# Patient Record
Sex: MALE | Race: WHITE | NOT HISPANIC OR LATINO | Employment: FULL TIME | URBAN - METROPOLITAN AREA
[De-identification: names, ages, dates, MRNs, and addresses within clinical notes are randomized per-mention and may not be internally consistent; named-entity substitution may affect disease eponyms.]

---

## 2017-01-13 RX ORDER — SERTRALINE HYDROCHLORIDE 100 MG/1
TABLET, FILM COATED ORAL
Qty: 30 TABLET | Refills: 11 | Status: SHIPPED | OUTPATIENT
Start: 2017-01-13 | End: 2018-01-24 | Stop reason: SDUPTHER

## 2017-01-13 RX ORDER — LISINOPRIL AND HYDROCHLOROTHIAZIDE 12.5; 2 MG/1; MG/1
TABLET ORAL
Qty: 30 TABLET | Refills: 11 | Status: SHIPPED | OUTPATIENT
Start: 2017-01-13 | End: 2018-01-24 | Stop reason: SDUPTHER

## 2017-05-17 ENCOUNTER — PATIENT OUTREACH (OUTPATIENT)
Dept: ADMINISTRATIVE | Facility: HOSPITAL | Age: 62
End: 2017-05-17

## 2017-05-18 DIAGNOSIS — E78.5 HYPERLIPIDEMIA: ICD-10-CM

## 2017-05-18 RX ORDER — ATORVASTATIN CALCIUM 40 MG/1
TABLET, FILM COATED ORAL
Qty: 30 TABLET | Refills: 0 | Status: SHIPPED | OUTPATIENT
Start: 2017-05-18 | End: 2017-06-20 | Stop reason: SDUPTHER

## 2017-06-19 ENCOUNTER — PATIENT OUTREACH (OUTPATIENT)
Dept: ADMINISTRATIVE | Facility: HOSPITAL | Age: 62
End: 2017-06-19

## 2017-06-19 NOTE — PROGRESS NOTES
CHART AUDIT COMPLETED. PORTAL MESSAGE SENT TO INFORM PATIENT OF OVERDUE HEALTH MAINTENANCE. LAB ORDERS PENDED.

## 2017-06-20 ENCOUNTER — PATIENT MESSAGE (OUTPATIENT)
Dept: ADMINISTRATIVE | Facility: HOSPITAL | Age: 62
End: 2017-06-20

## 2017-06-20 DIAGNOSIS — I10 ESSENTIAL HYPERTENSION: Primary | ICD-10-CM

## 2017-06-20 DIAGNOSIS — Z12.5 PROSTATE CANCER SCREENING: ICD-10-CM

## 2017-06-20 DIAGNOSIS — E78.5 HYPERLIPIDEMIA: ICD-10-CM

## 2017-06-20 DIAGNOSIS — Z82.49 FAMILY HISTORY OF ISCHEMIC HEART DISEASE (IHD): ICD-10-CM

## 2017-06-20 DIAGNOSIS — E78.5 HYPERLIPIDEMIA, UNSPECIFIED HYPERLIPIDEMIA TYPE: ICD-10-CM

## 2017-06-20 RX ORDER — ATORVASTATIN CALCIUM 40 MG/1
TABLET, FILM COATED ORAL
Qty: 30 TABLET | Refills: 0 | Status: SHIPPED | OUTPATIENT
Start: 2017-06-20 | End: 2017-07-20 | Stop reason: SDUPTHER

## 2017-07-10 ENCOUNTER — LAB VISIT (OUTPATIENT)
Dept: LAB | Facility: HOSPITAL | Age: 62
End: 2017-07-10
Attending: INTERNAL MEDICINE
Payer: COMMERCIAL

## 2017-07-10 DIAGNOSIS — E78.5 HYPERLIPIDEMIA, UNSPECIFIED HYPERLIPIDEMIA TYPE: ICD-10-CM

## 2017-07-10 DIAGNOSIS — Z82.49 FAMILY HISTORY OF ISCHEMIC HEART DISEASE (IHD): ICD-10-CM

## 2017-07-10 DIAGNOSIS — I10 ESSENTIAL HYPERTENSION: ICD-10-CM

## 2017-07-10 DIAGNOSIS — Z12.5 PROSTATE CANCER SCREENING: ICD-10-CM

## 2017-07-10 LAB
BASOPHILS # BLD AUTO: 0.06 K/UL
BASOPHILS NFR BLD: 0.9 %
DIFFERENTIAL METHOD: ABNORMAL
EOSINOPHIL # BLD AUTO: 0.2 K/UL
EOSINOPHIL NFR BLD: 2.8 %
ERYTHROCYTE [DISTWIDTH] IN BLOOD BY AUTOMATED COUNT: 14.2 %
HCT VFR BLD AUTO: 40.3 %
HGB BLD-MCNC: 13.7 G/DL
LYMPHOCYTES # BLD AUTO: 1.8 K/UL
LYMPHOCYTES NFR BLD: 27.9 %
MCH RBC QN AUTO: 30.4 PG
MCHC RBC AUTO-ENTMCNC: 34 %
MCV RBC AUTO: 89 FL
MONOCYTES # BLD AUTO: 0.9 K/UL
MONOCYTES NFR BLD: 13.5 %
NEUTROPHILS # BLD AUTO: 3.5 K/UL
NEUTROPHILS NFR BLD: 54.4 %
PLATELET # BLD AUTO: 302 K/UL
PMV BLD AUTO: 9.8 FL
RBC # BLD AUTO: 4.51 M/UL
WBC # BLD AUTO: 6.38 K/UL

## 2017-07-10 PROCEDURE — 36415 COLL VENOUS BLD VENIPUNCTURE: CPT | Mod: PO

## 2017-07-10 PROCEDURE — 84153 ASSAY OF PSA TOTAL: CPT

## 2017-07-10 PROCEDURE — 80053 COMPREHEN METABOLIC PANEL: CPT

## 2017-07-10 PROCEDURE — 85025 COMPLETE CBC W/AUTO DIFF WBC: CPT

## 2017-07-10 PROCEDURE — 80061 LIPID PANEL: CPT

## 2017-07-11 ENCOUNTER — LAB VISIT (OUTPATIENT)
Dept: LAB | Facility: HOSPITAL | Age: 62
End: 2017-07-11
Attending: INTERNAL MEDICINE
Payer: COMMERCIAL

## 2017-07-11 ENCOUNTER — OFFICE VISIT (OUTPATIENT)
Dept: INTERNAL MEDICINE | Facility: CLINIC | Age: 62
End: 2017-07-11
Payer: COMMERCIAL

## 2017-07-11 ENCOUNTER — PATIENT MESSAGE (OUTPATIENT)
Dept: INTERNAL MEDICINE | Facility: CLINIC | Age: 62
End: 2017-07-11

## 2017-07-11 VITALS
SYSTOLIC BLOOD PRESSURE: 100 MMHG | TEMPERATURE: 97 F | HEART RATE: 76 BPM | DIASTOLIC BLOOD PRESSURE: 70 MMHG | WEIGHT: 239.44 LBS | BODY MASS INDEX: 31.73 KG/M2 | HEIGHT: 73 IN

## 2017-07-11 DIAGNOSIS — E78.2 MIXED HYPERLIPIDEMIA: ICD-10-CM

## 2017-07-11 DIAGNOSIS — F32.A DEPRESSION, UNSPECIFIED DEPRESSION TYPE: ICD-10-CM

## 2017-07-11 DIAGNOSIS — M25.571 ARTHRALGIA OF RIGHT FOOT: ICD-10-CM

## 2017-07-11 DIAGNOSIS — I10 ESSENTIAL HYPERTENSION: ICD-10-CM

## 2017-07-11 DIAGNOSIS — Z00.00 ROUTINE GENERAL MEDICAL EXAMINATION AT HEALTH CARE FACILITY: Primary | ICD-10-CM

## 2017-07-11 LAB
ALBUMIN SERPL BCP-MCNC: 3.5 G/DL
ALP SERPL-CCNC: 78 U/L
ALT SERPL W/O P-5'-P-CCNC: 34 U/L
ANION GAP SERPL CALC-SCNC: 11 MMOL/L
AST SERPL-CCNC: 30 U/L
BILIRUB SERPL-MCNC: 0.6 MG/DL
BUN SERPL-MCNC: 20 MG/DL
CALCIUM SERPL-MCNC: 9.3 MG/DL
CHLORIDE SERPL-SCNC: 96 MMOL/L
CHOLEST/HDLC SERPL: 3.8 {RATIO}
CO2 SERPL-SCNC: 29 MMOL/L
COMPLEXED PSA SERPL-MCNC: 0.56 NG/ML
CREAT SERPL-MCNC: 1.3 MG/DL
EST. GFR  (AFRICAN AMERICAN): >60 ML/MIN/1.73 M^2
EST. GFR  (NON AFRICAN AMERICAN): 58.5 ML/MIN/1.73 M^2
GLUCOSE SERPL-MCNC: 73 MG/DL
HDL/CHOLESTEROL RATIO: 26.2 %
HDLC SERPL-MCNC: 145 MG/DL
HDLC SERPL-MCNC: 38 MG/DL
LDLC SERPL CALC-MCNC: 62.6 MG/DL
NONHDLC SERPL-MCNC: 107 MG/DL
POTASSIUM SERPL-SCNC: 3.5 MMOL/L
PROT SERPL-MCNC: 7.4 G/DL
SODIUM SERPL-SCNC: 136 MMOL/L
TRIGL SERPL-MCNC: 222 MG/DL
URATE SERPL-MCNC: 9.7 MG/DL

## 2017-07-11 PROCEDURE — 36415 COLL VENOUS BLD VENIPUNCTURE: CPT | Mod: PO

## 2017-07-11 PROCEDURE — 99999 PR PBB SHADOW E&M-EST. PATIENT-LVL III: CPT | Mod: PBBFAC,,, | Performed by: INTERNAL MEDICINE

## 2017-07-11 PROCEDURE — 90736 HZV VACCINE LIVE SUBQ: CPT | Mod: S$GLB,,, | Performed by: INTERNAL MEDICINE

## 2017-07-11 PROCEDURE — 84550 ASSAY OF BLOOD/URIC ACID: CPT

## 2017-07-11 PROCEDURE — 99396 PREV VISIT EST AGE 40-64: CPT | Mod: S$GLB,,, | Performed by: INTERNAL MEDICINE

## 2017-07-11 PROCEDURE — 90471 IMMUNIZATION ADMIN: CPT | Mod: S$GLB,,, | Performed by: INTERNAL MEDICINE

## 2017-07-11 RX ORDER — HYDROCORTISONE 25 MG/G
CREAM TOPICAL
COMMUNITY
Start: 2017-05-23 | End: 2021-12-02

## 2017-07-11 RX ORDER — INDOMETHACIN 75 MG/1
75 CAPSULE, EXTENDED RELEASE ORAL 2 TIMES DAILY
Qty: 60 CAPSULE | Refills: 1 | Status: SHIPPED | OUTPATIENT
Start: 2017-07-11 | End: 2018-12-23 | Stop reason: SDUPTHER

## 2017-07-11 NOTE — PROGRESS NOTES
Here are the results of your recent labs.  We will review them in detail at your follow up visit.    Sincerely,    Ravindra Bernard M.D.        If you would like to review your experience with Dr. Bernard or [facility], please follow the link below:    [email or web address]

## 2017-07-11 NOTE — PROGRESS NOTES
"Subjective:       Patient ID: Byron Styles is a 62 y.o. male.    Chief Complaint: Annual Exam    HPI  Patient is a 62-year-old male with a history of hypertension hyperlipidemia depression.  He presents to for updated physical exam review chronic health issues.  He indicates he is not having significant problems at this time.  He is tolerating his medications well.  He is not had any hospitalizations or emergency room visits.    He is concerned about the possibility of some gout.  He states over the last week or so he's been having significant pain in the right great toe region.  Says he area around it is been red and hot and exquisitely tender.  He has not had gout in the past but from what he's been reading this certainly sounds like gout.  He has tried some over-the-counter anti-inflammatories with limited improvement.    Review of Systems   Constitutional: Negative for activity change, fever and unexpected weight change.   HENT: Negative for hearing loss, postnasal drip, rhinorrhea and trouble swallowing.    Eyes: Negative for pain, discharge and visual disturbance.   Respiratory: Negative for cough, chest tightness, shortness of breath and wheezing.    Cardiovascular: Negative for chest pain and palpitations.   Gastrointestinal: Negative for blood in stool, constipation, diarrhea, nausea and vomiting.   Endocrine: Negative for polydipsia and polyuria.   Genitourinary: Negative for difficulty urinating, dysuria, hematuria and urgency.   Musculoskeletal: Positive for arthralgias. Negative for back pain, joint swelling, myalgias, neck pain and neck stiffness.   Skin: Negative for pallor and rash.   Neurological: Negative for seizures, syncope, weakness and headaches.   Hematological: Negative for adenopathy.   Psychiatric/Behavioral: Negative for confusion and dysphoric mood. The patient is not nervous/anxious.        Objective:   /70   Pulse 76   Temp 97 °F (36.1 °C)   Ht 6' 0.75" (1.848 m)   Wt " 108.6 kg (239 lb 6.7 oz)   BMI 31.81 kg/m²      Physical Exam   Constitutional: He is oriented to person, place, and time. He appears well-developed and well-nourished. No distress.   HENT:   Head: Normocephalic and atraumatic.   Mouth/Throat: Oropharynx is clear and moist.   Eyes: EOM are normal. Pupils are equal, round, and reactive to light.   Neck: Normal range of motion. Neck supple. No JVD present. No thyromegaly present.   Cardiovascular: Normal rate, regular rhythm, normal heart sounds and intact distal pulses.  Exam reveals no gallop and no friction rub.    No murmur heard.  Pulmonary/Chest: Effort normal and breath sounds normal. He has no wheezes. He has no rales.   Abdominal: Soft. Bowel sounds are normal. He exhibits no distension. There is no tenderness. There is no rebound and no guarding.   Musculoskeletal: Normal range of motion. He exhibits no edema.   Right great toe demonstrates tenderness at the first MTP.  There is erythema and warmth.   Lymphadenopathy:     He has no cervical adenopathy.   Neurological: He is alert and oriented to person, place, and time. He has normal reflexes. No cranial nerve deficit.   Skin: Skin is warm and dry. No rash noted.   Psychiatric: He has a normal mood and affect. Judgment normal.   Vitals reviewed.      Lab Visit on 07/10/2017   Component Date Value    Cholesterol 07/11/2017 145     Triglycerides 07/11/2017 222*    HDL 07/11/2017 38*    LDL Cholesterol 07/11/2017 62.6*    HDL/Chol Ratio 07/11/2017 26.2     Total Cholesterol/HDL Ra* 07/11/2017 3.8     Non-HDL Cholesterol 07/11/2017 107     Sodium 07/11/2017 136     Potassium 07/11/2017 3.5     Chloride 07/11/2017 96     CO2 07/11/2017 29     Glucose 07/11/2017 73     BUN, Bld 07/11/2017 20     Creatinine 07/11/2017 1.3     Calcium 07/11/2017 9.3     Total Protein 07/11/2017 7.4     Albumin 07/11/2017 3.5     Total Bilirubin 07/11/2017 0.6     Alkaline Phosphatase 07/11/2017 78     AST  07/11/2017 30     ALT 07/11/2017 34     Anion Gap 07/11/2017 11     eGFR if African American 07/11/2017 >60.0     eGFR if non African Amer* 07/11/2017 58.5*    PSA, SCREEN 07/11/2017 0.56     WBC 07/10/2017 6.38     RBC 07/10/2017 4.51*    Hemoglobin 07/10/2017 13.7*    Hematocrit 07/10/2017 40.3     MCV 07/10/2017 89     MCH 07/10/2017 30.4     MCHC 07/10/2017 34.0     RDW 07/10/2017 14.2     Platelets 07/10/2017 302     MPV 07/10/2017 9.8     Gran # 07/10/2017 3.5     Lymph # 07/10/2017 1.8     Mono # 07/10/2017 0.9     Eos # 07/10/2017 0.2     Baso # 07/10/2017 0.06     Gran% 07/10/2017 54.4     Lymph% 07/10/2017 27.9     Mono% 07/10/2017 13.5     Eosinophil% 07/10/2017 2.8     Basophil% 07/10/2017 0.9     Differential Method 07/10/2017 Automated        Assessment:       1. Routine general medical examination at health care facility    2. Essential hypertension    3. Mixed hyperlipidemia    4. Arthralgia of right foot    5. Depression, unspecified depression type        Plan:   Essential hypertension  Blood pressure is under good control.  We will continue the current regimen.  Will work on regular aerobic exercise and a low salt diet.        Hyperlipidemia  Continue lipitor, high fiber diet.    Depression  Continue carissa Matthews was seen today for annual exam.    Diagnoses and all orders for this visit:    Routine general medical examination at health care facility  Comments:  Focus on good health habits, low fat diet, regular exercise, seatbelt use, sunscreen use.    Essential hypertension    Mixed hyperlipidemia    Arthralgia of right foot  -     Uric acid; Future  -     indomethacin (INDOCIN SR) 75 mg CpSR CR capsule; Take 1 capsule (75 mg total) by mouth 2 (two) times daily. With food    Depression, unspecified depression type    Other orders  -     Cancel: Lipid panel; Future  -     Cancel: Comprehensive metabolic panel; Future  -     Cancel: PSA, Screening; Future  -     Zoster  Vaccine - Live        Return in about 6 months (around 1/11/2018) for htn.

## 2017-07-20 DIAGNOSIS — E78.5 HYPERLIPIDEMIA: ICD-10-CM

## 2017-07-20 RX ORDER — ATORVASTATIN CALCIUM 40 MG/1
TABLET, FILM COATED ORAL
Qty: 30 TABLET | Refills: 0 | Status: SHIPPED | OUTPATIENT
Start: 2017-07-20 | End: 2017-08-20 | Stop reason: SDUPTHER

## 2017-08-20 DIAGNOSIS — E78.5 HYPERLIPIDEMIA: ICD-10-CM

## 2017-08-20 RX ORDER — NADOLOL 20 MG/1
TABLET ORAL
Qty: 30 TABLET | Refills: 11 | Status: SHIPPED | OUTPATIENT
Start: 2017-08-20 | End: 2018-09-03 | Stop reason: SDUPTHER

## 2017-08-21 RX ORDER — ATORVASTATIN CALCIUM 40 MG/1
TABLET, FILM COATED ORAL
Qty: 30 TABLET | Refills: 11 | Status: SHIPPED | OUTPATIENT
Start: 2017-08-21 | End: 2018-09-04 | Stop reason: SDUPTHER

## 2018-01-24 RX ORDER — SERTRALINE HYDROCHLORIDE 100 MG/1
TABLET, FILM COATED ORAL
Qty: 30 TABLET | Refills: 11 | Status: SHIPPED | OUTPATIENT
Start: 2018-01-24 | End: 2019-02-03 | Stop reason: SDUPTHER

## 2018-01-24 RX ORDER — LISINOPRIL AND HYDROCHLOROTHIAZIDE 12.5; 2 MG/1; MG/1
TABLET ORAL
Qty: 30 TABLET | Refills: 11 | Status: SHIPPED | OUTPATIENT
Start: 2018-01-24 | End: 2019-01-08 | Stop reason: SDUPTHER

## 2018-02-01 ENCOUNTER — OFFICE VISIT (OUTPATIENT)
Dept: INTERNAL MEDICINE | Facility: CLINIC | Age: 63
End: 2018-02-01
Payer: COMMERCIAL

## 2018-02-01 VITALS
HEIGHT: 73 IN | HEART RATE: 93 BPM | TEMPERATURE: 98 F | WEIGHT: 238.13 LBS | SYSTOLIC BLOOD PRESSURE: 110 MMHG | DIASTOLIC BLOOD PRESSURE: 70 MMHG | BODY MASS INDEX: 31.56 KG/M2

## 2018-02-01 DIAGNOSIS — E78.2 MIXED HYPERLIPIDEMIA: ICD-10-CM

## 2018-02-01 DIAGNOSIS — F32.A DEPRESSION, UNSPECIFIED DEPRESSION TYPE: ICD-10-CM

## 2018-02-01 DIAGNOSIS — I10 ESSENTIAL HYPERTENSION: Primary | ICD-10-CM

## 2018-02-01 PROCEDURE — 99214 OFFICE O/P EST MOD 30 MIN: CPT | Mod: S$GLB,,, | Performed by: INTERNAL MEDICINE

## 2018-02-01 PROCEDURE — 99999 PR PBB SHADOW E&M-EST. PATIENT-LVL III: CPT | Mod: PBBFAC,,, | Performed by: INTERNAL MEDICINE

## 2018-02-01 PROCEDURE — 3008F BODY MASS INDEX DOCD: CPT | Mod: S$GLB,,, | Performed by: INTERNAL MEDICINE

## 2018-02-01 NOTE — PROGRESS NOTES
"Subjective:       Patient ID: Byron Styles is a 62 y.o. male.    Chief Complaint: Follow-up    HPI  Patient is a 62-year-old male coming in today following up on his hypertension hyperlipidemia and depression.  He indicates he is doing well.  He continues take his medications as prescribed.  He is having no adverse effects the medications.  His blood pressure remains well controlled he is tolerating the medication without adverse effects.  As far as cholesterol is concerned he continues take his Lipitor.  His numbers looked excellent with his lab work in July.  He relates no adverse effects the meds.    He continues take his Zoloft for the depression.  She is experiencing good control of symptoms.  He would like to stay on the medication at this time.    Review of Systems   Constitutional: Negative for activity change and unexpected weight change.   HENT: Negative for hearing loss, rhinorrhea and trouble swallowing.    Eyes: Negative for discharge and visual disturbance.   Respiratory: Negative for chest tightness and wheezing.    Cardiovascular: Negative for chest pain and palpitations.   Gastrointestinal: Negative for blood in stool, constipation, diarrhea and vomiting.   Endocrine: Negative for polydipsia and polyuria.   Genitourinary: Negative for difficulty urinating, hematuria and urgency.   Musculoskeletal: Positive for arthralgias. Negative for joint swelling and neck pain.   Neurological: Negative for weakness and headaches.   Psychiatric/Behavioral: Negative for confusion and dysphoric mood.       Objective:   /70   Pulse 93   Temp 98.1 °F (36.7 °C) (Tympanic)   Ht 6' 1" (1.854 m)   Wt 108 kg (238 lb 1.6 oz)   BMI 31.41 kg/m²      Physical Exam   Constitutional: He appears well-developed and well-nourished.   HENT:   Head: Normocephalic and atraumatic.   Eyes: Pupils are equal, round, and reactive to light.   Neck: Neck supple. No thyromegaly present.   Cardiovascular: Normal rate, regular " rhythm and normal heart sounds.  Exam reveals no gallop and no friction rub.    No murmur heard.  Pulmonary/Chest: Breath sounds normal. He has no wheezes. He has no rales.   Abdominal: Soft. Bowel sounds are normal. He exhibits no distension. There is no tenderness.   Vitals reviewed.      No visits with results within 2 Week(s) from this visit.   Latest known visit with results is:   Lab Visit on 07/11/2017   Component Date Value    Uric Acid 07/11/2017 9.7*       Assessment:       1. Essential hypertension    2. Mixed hyperlipidemia    3. Depression, unspecified depression type        Plan:   Essential hypertension  Blood pressure is under good control.  We will continue the current regimen.  Will work on regular aerobic exercise and a low salt diet.        Hyperlipidemia  Stable on lipitor, no changes today.    Depression  Still doing well with zoloft.  No issues. Mood is stable.    Byron was seen today for follow-up.    Diagnoses and all orders for this visit:    Essential hypertension  -     CBC auto differential; Future  -     Comprehensive metabolic panel; Future  -     Lipid panel; Future  -     PSA, Screening; Future    Mixed hyperlipidemia    Depression, unspecified depression type        Follow-up in about 6 months (around 8/1/2018).

## 2018-08-02 ENCOUNTER — LAB VISIT (OUTPATIENT)
Dept: LAB | Facility: HOSPITAL | Age: 63
End: 2018-08-02
Attending: INTERNAL MEDICINE
Payer: COMMERCIAL

## 2018-08-02 DIAGNOSIS — I10 ESSENTIAL HYPERTENSION: ICD-10-CM

## 2018-08-02 LAB
ALBUMIN SERPL BCP-MCNC: 3.6 G/DL
ALP SERPL-CCNC: 77 U/L
ALT SERPL W/O P-5'-P-CCNC: 38 U/L
ANION GAP SERPL CALC-SCNC: 12 MMOL/L
AST SERPL-CCNC: 34 U/L
BASOPHILS # BLD AUTO: 0.06 K/UL
BASOPHILS NFR BLD: 0.9 %
BILIRUB SERPL-MCNC: 0.7 MG/DL
BUN SERPL-MCNC: 13 MG/DL
CALCIUM SERPL-MCNC: 9.5 MG/DL
CHLORIDE SERPL-SCNC: 101 MMOL/L
CHOLEST SERPL-MCNC: 148 MG/DL
CHOLEST/HDLC SERPL: 4 {RATIO}
CO2 SERPL-SCNC: 28 MMOL/L
COMPLEXED PSA SERPL-MCNC: 0.74 NG/ML
CREAT SERPL-MCNC: 1.1 MG/DL
DIFFERENTIAL METHOD: ABNORMAL
EOSINOPHIL # BLD AUTO: 0.2 K/UL
EOSINOPHIL NFR BLD: 3 %
ERYTHROCYTE [DISTWIDTH] IN BLOOD BY AUTOMATED COUNT: 14.2 %
EST. GFR  (AFRICAN AMERICAN): >60 ML/MIN/1.73 M^2
EST. GFR  (NON AFRICAN AMERICAN): >60 ML/MIN/1.73 M^2
GLUCOSE SERPL-MCNC: 86 MG/DL
HCT VFR BLD AUTO: 44.8 %
HDLC SERPL-MCNC: 37 MG/DL
HDLC SERPL: 25 %
HGB BLD-MCNC: 14.4 G/DL
IMM GRANULOCYTES # BLD AUTO: 0.02 K/UL
IMM GRANULOCYTES NFR BLD AUTO: 0.3 %
LDLC SERPL CALC-MCNC: 69.8 MG/DL
LYMPHOCYTES # BLD AUTO: 2 K/UL
LYMPHOCYTES NFR BLD: 30.7 %
MCH RBC QN AUTO: 30.6 PG
MCHC RBC AUTO-ENTMCNC: 32.1 G/DL
MCV RBC AUTO: 95 FL
MONOCYTES # BLD AUTO: 0.7 K/UL
MONOCYTES NFR BLD: 10.5 %
NEUTROPHILS # BLD AUTO: 3.5 K/UL
NEUTROPHILS NFR BLD: 54.6 %
NONHDLC SERPL-MCNC: 111 MG/DL
NRBC BLD-RTO: 0 /100 WBC
PLATELET # BLD AUTO: 354 K/UL
PMV BLD AUTO: 9.9 FL
POTASSIUM SERPL-SCNC: 3.8 MMOL/L
PROT SERPL-MCNC: 7.2 G/DL
RBC # BLD AUTO: 4.71 M/UL
SODIUM SERPL-SCNC: 141 MMOL/L
TRIGL SERPL-MCNC: 206 MG/DL
WBC # BLD AUTO: 6.38 K/UL

## 2018-08-02 PROCEDURE — 80053 COMPREHEN METABOLIC PANEL: CPT

## 2018-08-02 PROCEDURE — 85025 COMPLETE CBC W/AUTO DIFF WBC: CPT

## 2018-08-02 PROCEDURE — 80061 LIPID PANEL: CPT

## 2018-08-02 PROCEDURE — 36415 COLL VENOUS BLD VENIPUNCTURE: CPT | Mod: PO

## 2018-08-02 PROCEDURE — 84153 ASSAY OF PSA TOTAL: CPT

## 2018-09-03 RX ORDER — NADOLOL 20 MG/1
TABLET ORAL
Qty: 30 TABLET | Refills: 11 | Status: SHIPPED | OUTPATIENT
Start: 2018-09-03 | End: 2019-09-10 | Stop reason: SDUPTHER

## 2018-09-04 DIAGNOSIS — E78.5 HYPERLIPIDEMIA: ICD-10-CM

## 2018-09-04 RX ORDER — ATORVASTATIN CALCIUM 40 MG/1
TABLET, FILM COATED ORAL
Qty: 30 TABLET | Refills: 11 | Status: SHIPPED | OUTPATIENT
Start: 2018-09-04 | End: 2019-08-31 | Stop reason: SDUPTHER

## 2018-09-13 ENCOUNTER — OFFICE VISIT (OUTPATIENT)
Dept: INTERNAL MEDICINE | Facility: CLINIC | Age: 63
End: 2018-09-13
Payer: COMMERCIAL

## 2018-09-13 VITALS
WEIGHT: 248.44 LBS | TEMPERATURE: 100 F | BODY MASS INDEX: 32.93 KG/M2 | DIASTOLIC BLOOD PRESSURE: 68 MMHG | HEART RATE: 70 BPM | HEIGHT: 73 IN | SYSTOLIC BLOOD PRESSURE: 110 MMHG

## 2018-09-13 DIAGNOSIS — I10 ESSENTIAL HYPERTENSION: ICD-10-CM

## 2018-09-13 DIAGNOSIS — E78.2 MIXED HYPERLIPIDEMIA: ICD-10-CM

## 2018-09-13 DIAGNOSIS — F32.A DEPRESSION, UNSPECIFIED DEPRESSION TYPE: ICD-10-CM

## 2018-09-13 DIAGNOSIS — Z00.00 ROUTINE GENERAL MEDICAL EXAMINATION AT HEALTH CARE FACILITY: Primary | ICD-10-CM

## 2018-09-13 PROCEDURE — 99999 PR PBB SHADOW E&M-EST. PATIENT-LVL III: CPT | Mod: PBBFAC,,, | Performed by: INTERNAL MEDICINE

## 2018-09-13 PROCEDURE — 3078F DIAST BP <80 MM HG: CPT | Mod: CPTII,S$GLB,, | Performed by: INTERNAL MEDICINE

## 2018-09-13 PROCEDURE — 99396 PREV VISIT EST AGE 40-64: CPT | Mod: S$GLB,,, | Performed by: INTERNAL MEDICINE

## 2018-09-13 PROCEDURE — 3074F SYST BP LT 130 MM HG: CPT | Mod: CPTII,S$GLB,, | Performed by: INTERNAL MEDICINE

## 2018-09-13 NOTE — PROGRESS NOTES
"Subjective:       Patient ID: Byron Styles is a 63 y.o. male.    Chief Complaint: Annual Exam    HPI Patient is a 63-year-old male with history of hypertension, hyperlipidemia and depression.  He comes in today for updated physical exam review of chronic health issues.  He indicates he is doing very well.  He is not having any problems with his medications nor any of his chronic conditions.  He has had no hospitalizations or ER visits.  Generally he feels like he is doing quite well.    Review of Systems   Constitutional: Negative for activity change, fever and unexpected weight change.   HENT: Negative for hearing loss, postnasal drip, rhinorrhea and trouble swallowing.    Eyes: Negative for pain, discharge and visual disturbance.   Respiratory: Negative for cough, chest tightness, shortness of breath and wheezing.    Cardiovascular: Negative for chest pain and palpitations.   Gastrointestinal: Negative for blood in stool, constipation, diarrhea, nausea and vomiting.   Endocrine: Negative for polyuria.   Genitourinary: Negative for difficulty urinating, dysuria, hematuria and urgency.   Musculoskeletal: Positive for arthralgias. Negative for back pain, joint swelling, myalgias, neck pain and neck stiffness.   Skin: Negative for pallor and rash.   Neurological: Negative for seizures, syncope, weakness and headaches.   Hematological: Negative for adenopathy.   Psychiatric/Behavioral: Negative for confusion and dysphoric mood. The patient is not nervous/anxious.        Objective:   /68   Pulse 70   Temp 99.5 °F (37.5 °C)   Ht 6' 1" (1.854 m)   Wt 112.7 kg (248 lb 7.3 oz)   BMI 32.78 kg/m²      Physical Exam   Constitutional: He is oriented to person, place, and time. He appears well-developed and well-nourished. No distress.   HENT:   Head: Normocephalic and atraumatic.   Mouth/Throat: Oropharynx is clear and moist.   Eyes: EOM are normal. Pupils are equal, round, and reactive to light.   Neck: Normal " range of motion. Neck supple. No JVD present. No thyromegaly present.   Cardiovascular: Normal rate, regular rhythm, normal heart sounds and intact distal pulses. Exam reveals no gallop and no friction rub.   No murmur heard.  Pulmonary/Chest: Effort normal and breath sounds normal. He has no wheezes. He has no rales.   Abdominal: Soft. Bowel sounds are normal. He exhibits no distension. There is no tenderness. There is no rebound and no guarding.   Musculoskeletal: Normal range of motion. He exhibits no edema.   Lymphadenopathy:     He has no cervical adenopathy.   Neurological: He is alert and oriented to person, place, and time. He has normal reflexes. No cranial nerve deficit.   Skin: Skin is warm and dry. No rash noted.   Psychiatric: He has a normal mood and affect. Judgment normal.   Vitals reviewed.      No visits with results within 2 Week(s) from this visit.   Latest known visit with results is:   Lab Visit on 08/02/2018   Component Date Value    WBC 08/02/2018 6.38     RBC 08/02/2018 4.71     Hemoglobin 08/02/2018 14.4     Hematocrit 08/02/2018 44.8     MCV 08/02/2018 95     MCH 08/02/2018 30.6     MCHC 08/02/2018 32.1     RDW 08/02/2018 14.2     Platelets 08/02/2018 354*    MPV 08/02/2018 9.9     Immature Granulocytes 08/02/2018 0.3     Gran # (ANC) 08/02/2018 3.5     Immature Grans (Abs) 08/02/2018 0.02     Lymph # 08/02/2018 2.0     Mono # 08/02/2018 0.7     Eos # 08/02/2018 0.2     Baso # 08/02/2018 0.06     nRBC 08/02/2018 0     Gran% 08/02/2018 54.6     Lymph% 08/02/2018 30.7     Mono% 08/02/2018 10.5     Eosinophil% 08/02/2018 3.0     Basophil% 08/02/2018 0.9     Differential Method 08/02/2018 Automated     Sodium 08/02/2018 141     Potassium 08/02/2018 3.8     Chloride 08/02/2018 101     CO2 08/02/2018 28     Glucose 08/02/2018 86     BUN, Bld 08/02/2018 13     Creatinine 08/02/2018 1.1     Calcium 08/02/2018 9.5     Total Protein 08/02/2018 7.2     Albumin  08/02/2018 3.6     Total Bilirubin 08/02/2018 0.7     Alkaline Phosphatase 08/02/2018 77     AST 08/02/2018 34     ALT 08/02/2018 38     Anion Gap 08/02/2018 12     eGFR if African American 08/02/2018 >60.0     eGFR if non African Amer* 08/02/2018 >60.0     Cholesterol 08/02/2018 148     Triglycerides 08/02/2018 206*    HDL 08/02/2018 37*    LDL Cholesterol 08/02/2018 69.8     HDL/Chol Ratio 08/02/2018 25.0     Total Cholesterol/HDL Ra* 08/02/2018 4.0     Non-HDL Cholesterol 08/02/2018 111     PSA, SCREEN 08/02/2018 0.74        Assessment:       1. Routine general medical examination at health care facility    2. Essential hypertension    3. Mixed hyperlipidemia    4. Depression, unspecified depression type        Plan:   Essential hypertension  Blood pressure is under good control.  We will continue the current regimen.  Will work on regular aerobic exercise and a low salt diet.        Hyperlipidemia  Cholesterol numbers look good.  We will continue the current regimen at this time.  Remain focused on low fat diet and high dietary fiber intake.      Depression  Good control.  No issues at this time.    Byron was seen today for annual exam.    Diagnoses and all orders for this visit:    Routine general medical examination at health care facility  Comments:  Focus on good health habits, low fat diet, regular exercise, seatbelt use, sunscreen use    Essential hypertension    Mixed hyperlipidemia  -     Lipid panel; Future    Depression, unspecified depression type        Follow-up in about 6 months (around 3/13/2019) for HTN, hlp, with Geno Uribe PA-C.

## 2018-09-17 ENCOUNTER — TELEPHONE (OUTPATIENT)
Dept: INTERNAL MEDICINE | Facility: CLINIC | Age: 63
End: 2018-09-17

## 2018-09-17 NOTE — TELEPHONE ENCOUNTER
----- Message from Fran Mcnulty LPN sent at 9/17/2018  8:35 AM CDT -----      ----- Message -----  From: Hannah Darden  Sent: 9/14/2018   5:47 PM  To: [facility] Staff    Pt submitted a request thru my the portal to request blood labs before appt.      Thank you!

## 2018-09-17 NOTE — TELEPHONE ENCOUNTER
He just had labs last month and I just saw him last week.  He does not need labs prior to his follow up visit with Geno.

## 2018-12-23 DIAGNOSIS — M25.571 ARTHRALGIA OF RIGHT FOOT: ICD-10-CM

## 2018-12-24 RX ORDER — INDOMETHACIN 75 MG/1
CAPSULE, EXTENDED RELEASE ORAL
Qty: 60 CAPSULE | Refills: 1 | Status: SHIPPED | OUTPATIENT
Start: 2018-12-24 | End: 2019-11-18 | Stop reason: SDUPTHER

## 2019-01-08 RX ORDER — LISINOPRIL AND HYDROCHLOROTHIAZIDE 12.5; 2 MG/1; MG/1
1 TABLET ORAL DAILY
Qty: 30 TABLET | Refills: 11 | Status: SHIPPED | OUTPATIENT
Start: 2019-01-08 | End: 2019-01-16 | Stop reason: SDUPTHER

## 2019-01-16 RX ORDER — LISINOPRIL AND HYDROCHLOROTHIAZIDE 12.5; 2 MG/1; MG/1
1 TABLET ORAL DAILY
Qty: 30 TABLET | Refills: 11 | Status: SHIPPED | OUTPATIENT
Start: 2019-01-16 | End: 2019-03-14

## 2019-02-04 RX ORDER — SERTRALINE HYDROCHLORIDE 100 MG/1
TABLET, FILM COATED ORAL
Qty: 30 TABLET | Refills: 11 | Status: SHIPPED | OUTPATIENT
Start: 2019-02-04 | End: 2020-02-27

## 2019-03-05 ENCOUNTER — LAB VISIT (OUTPATIENT)
Dept: LAB | Facility: HOSPITAL | Age: 64
End: 2019-03-05
Attending: INTERNAL MEDICINE
Payer: COMMERCIAL

## 2019-03-05 DIAGNOSIS — E78.2 MIXED HYPERLIPIDEMIA: ICD-10-CM

## 2019-03-05 LAB
CHOLEST SERPL-MCNC: 158 MG/DL
CHOLEST/HDLC SERPL: 4.4 {RATIO}
HDLC SERPL-MCNC: 36 MG/DL
HDLC SERPL: 22.8 %
LDLC SERPL CALC-MCNC: 66.6 MG/DL
NONHDLC SERPL-MCNC: 122 MG/DL
TRIGL SERPL-MCNC: 277 MG/DL

## 2019-03-05 PROCEDURE — 36415 COLL VENOUS BLD VENIPUNCTURE: CPT | Mod: PO

## 2019-03-05 PROCEDURE — 80061 LIPID PANEL: CPT

## 2019-03-06 NOTE — PROGRESS NOTES
Here are the results of your recent labs.  We will review them in detail at your follow up visit.    Sincerely,    Ravindra Bernard M.D.        If you would like to review your experience with Dr. Bernard or [facility], please follow the link below:    [email or web address]

## 2019-03-14 ENCOUNTER — OFFICE VISIT (OUTPATIENT)
Dept: INTERNAL MEDICINE | Facility: CLINIC | Age: 64
End: 2019-03-14
Payer: COMMERCIAL

## 2019-03-14 VITALS
TEMPERATURE: 99 F | HEART RATE: 80 BPM | HEIGHT: 73 IN | DIASTOLIC BLOOD PRESSURE: 82 MMHG | WEIGHT: 247.13 LBS | BODY MASS INDEX: 32.75 KG/M2 | SYSTOLIC BLOOD PRESSURE: 120 MMHG

## 2019-03-14 DIAGNOSIS — E78.2 MIXED HYPERLIPIDEMIA: ICD-10-CM

## 2019-03-14 DIAGNOSIS — I10 ESSENTIAL HYPERTENSION: Primary | ICD-10-CM

## 2019-03-14 DIAGNOSIS — L98.9 SKIN LESION: ICD-10-CM

## 2019-03-14 PROCEDURE — 3008F PR BODY MASS INDEX (BMI) DOCUMENTED: ICD-10-PCS | Mod: CPTII,S$GLB,, | Performed by: PHYSICIAN ASSISTANT

## 2019-03-14 PROCEDURE — 3074F SYST BP LT 130 MM HG: CPT | Mod: CPTII,S$GLB,, | Performed by: PHYSICIAN ASSISTANT

## 2019-03-14 PROCEDURE — 3079F DIAST BP 80-89 MM HG: CPT | Mod: CPTII,S$GLB,, | Performed by: PHYSICIAN ASSISTANT

## 2019-03-14 PROCEDURE — 3008F BODY MASS INDEX DOCD: CPT | Mod: CPTII,S$GLB,, | Performed by: PHYSICIAN ASSISTANT

## 2019-03-14 PROCEDURE — 3079F PR MOST RECENT DIASTOLIC BLOOD PRESSURE 80-89 MM HG: ICD-10-PCS | Mod: CPTII,S$GLB,, | Performed by: PHYSICIAN ASSISTANT

## 2019-03-14 PROCEDURE — 99999 PR PBB SHADOW E&M-EST. PATIENT-LVL III: ICD-10-PCS | Mod: PBBFAC,,, | Performed by: PHYSICIAN ASSISTANT

## 2019-03-14 PROCEDURE — 99214 OFFICE O/P EST MOD 30 MIN: CPT | Mod: S$GLB,,, | Performed by: PHYSICIAN ASSISTANT

## 2019-03-14 PROCEDURE — 3074F PR MOST RECENT SYSTOLIC BLOOD PRESSURE < 130 MM HG: ICD-10-PCS | Mod: CPTII,S$GLB,, | Performed by: PHYSICIAN ASSISTANT

## 2019-03-14 PROCEDURE — 99999 PR PBB SHADOW E&M-EST. PATIENT-LVL III: CPT | Mod: PBBFAC,,, | Performed by: PHYSICIAN ASSISTANT

## 2019-03-14 PROCEDURE — 99214 PR OFFICE/OUTPT VISIT, EST, LEVL IV, 30-39 MIN: ICD-10-PCS | Mod: S$GLB,,, | Performed by: PHYSICIAN ASSISTANT

## 2019-03-14 RX ORDER — LOSARTAN POTASSIUM AND HYDROCHLOROTHIAZIDE 12.5; 5 MG/1; MG/1
1 TABLET ORAL DAILY
Qty: 90 TABLET | Refills: 3 | Status: SHIPPED | OUTPATIENT
Start: 2019-03-14 | End: 2020-09-10

## 2019-03-14 NOTE — PROGRESS NOTES
Subjective:       Patient ID: Byron Styles is a 63 y.o. male.    Chief Complaint: Follow-up    HPI  Patient comes in today for 6 month follow-up.He indicates he is doing very well.  He is not having any problems with his medications nor any of his chronic conditions.  He has had no hospitalizations or ER visits.  Generally he feels like he is doing quite well.    He is currently on ACE-I and given new recommendations for changing ACE-I due to possible lung CA risk \\he is ok with the switch \    Hi sBP is doing well     Having no issues with medications  There are no preventive care reminders to display for this patient.    Past Medical History:   Diagnosis Date    Colon polyps 2009    Depression     Hyperlipidemia     Hypertension     Localized osteoarthrosis not specified whether primary or secondary, lower leg 4/6/2015    Raynaud's disease        Current Outpatient Medications   Medication Sig Dispense Refill    aspirin (ECOTRIN) 81 MG EC tablet Take 81 mg by mouth once daily.      atorvastatin (LIPITOR) 40 MG tablet TAKE ONE TABLET BY MOUTH ONCE DAILY IN THE EVENING 30 tablet 11    hydrocortisone 2.5 % cream       indomethacin (INDOCIN SR) 75 mg CpSR CR capsule TAKE ONE CAPSULE BY MOUTH TWICE DAILY WITH FOOD 60 capsule 1    multivitamin capsule Take 1 capsule by mouth once daily.      nadolol (CORGARD) 20 MG tablet TAKE ONE TABLET BY MOUTH ONCE DAILY 30 tablet 11    sertraline (ZOLOFT) 100 MG tablet TAKE ONE TABLET BY MOUTH ONCE DAILY 30 tablet 11    losartan-hydrochlorothiazide 50-12.5 mg (HYZAAR) 50-12.5 mg per tablet Take 1 tablet by mouth once daily. 90 tablet 3     No current facility-administered medications for this visit.        Review of Systems   Constitutional: Negative for activity change and unexpected weight change.   HENT: Negative for hearing loss, rhinorrhea and trouble swallowing.    Eyes: Negative for discharge and visual disturbance.   Respiratory: Negative for chest  "tightness and wheezing.    Cardiovascular: Negative for chest pain and palpitations.   Gastrointestinal: Negative for blood in stool, constipation, diarrhea and vomiting.   Endocrine: Negative for polydipsia and polyuria.   Genitourinary: Negative for difficulty urinating, hematuria and urgency.   Musculoskeletal: Negative for arthralgias, joint swelling and neck pain.   Neurological: Negative for weakness and headaches.   Psychiatric/Behavioral: Negative for confusion and dysphoric mood.       Objective:   /82   Pulse 80   Temp 98.8 °F (37.1 °C) (Oral)   Ht 6' 1" (1.854 m)   Wt 112.1 kg (247 lb 2.2 oz)   BMI 32.61 kg/m²      Physical Exam   Constitutional: He is oriented to person, place, and time. He appears well-developed and well-nourished. No distress.   HENT:   Head: Normocephalic and atraumatic.   Eyes: EOM are normal. Pupils are equal, round, and reactive to light.   Neck: Normal range of motion. Neck supple.   Cardiovascular: Normal rate and regular rhythm.   Pulmonary/Chest: Effort normal.   Abdominal: Soft.   Musculoskeletal: He exhibits no edema.   Neurological: He is alert and oriented to person, place, and time.   Skin: Skin is warm. Capillary refill takes less than 2 seconds.   Psychiatric: He has a normal mood and affect. His behavior is normal.       There is a questionable basal cell carcinoma present on his left forearm is round and a half of cmin  size with a red, scalyappearance  Lab Results   Component Value Date    WBC 6.38 08/02/2018    HGB 14.4 08/02/2018    HCT 44.8 08/02/2018     (H) 08/02/2018    CHOL 158 03/05/2019    TRIG 277 (H) 03/05/2019    HDL 36 (L) 03/05/2019    ALT 38 08/02/2018    AST 34 08/02/2018     08/02/2018    K 3.8 08/02/2018     08/02/2018    CREATININE 1.1 08/02/2018    BUN 13 08/02/2018    CO2 28 08/02/2018    PSA 0.74 08/02/2018       Assessment:       1. Essential hypertension    2. Mixed hyperlipidemia    3. Skin lesion        Plan: "   Essential hypertension  Change in blood pressure medication and patient to follow up in 2 weeks for nurse visit regarding pressure.  Otherwise he will see PCP at 6 months for chronic issues.  Mixed hyperlipidemia  Cont statin.  Discussed that triglycerides are elevated a little bit, and discussed that diet is major player in this, he will attempt to improved diet.    Other orders  -     losartan-hydrochlorothiazide 50-12.5 mg (HYZAAR) 50-12.5 mg per tablet; Take 1 tablet by mouth once daily.  Dispense: 90 tablet; Refill: 3      Skin lesion  Follow-up with Dermatology, he has outside dermatology that he will follow-up about this lesion that is a questionable early skin cancer.

## 2019-08-31 DIAGNOSIS — E78.5 HYPERLIPIDEMIA: ICD-10-CM

## 2019-09-02 RX ORDER — ATORVASTATIN CALCIUM 40 MG/1
TABLET, FILM COATED ORAL
Qty: 90 TABLET | Refills: 3 | Status: SHIPPED | OUTPATIENT
Start: 2019-09-02 | End: 2020-11-01

## 2019-09-10 ENCOUNTER — PATIENT MESSAGE (OUTPATIENT)
Dept: INTERNAL MEDICINE | Facility: CLINIC | Age: 64
End: 2019-09-10

## 2019-09-10 RX ORDER — NADOLOL 20 MG/1
TABLET ORAL
Qty: 90 TABLET | Refills: 0 | Status: SHIPPED | OUTPATIENT
Start: 2019-09-10 | End: 2020-09-28

## 2019-09-10 NOTE — TELEPHONE ENCOUNTER
Patient has not seen Dr. Bernard in over a year.  Refill is given but the patient needs an appointment for an updated physical.

## 2019-09-17 ENCOUNTER — LAB VISIT (OUTPATIENT)
Dept: LAB | Facility: HOSPITAL | Age: 64
End: 2019-09-17
Attending: PHYSICIAN ASSISTANT
Payer: COMMERCIAL

## 2019-09-17 ENCOUNTER — OFFICE VISIT (OUTPATIENT)
Dept: INTERNAL MEDICINE | Facility: CLINIC | Age: 64
End: 2019-09-17
Payer: COMMERCIAL

## 2019-09-17 VITALS
BODY MASS INDEX: 32.4 KG/M2 | WEIGHT: 244.5 LBS | RESPIRATION RATE: 18 BRPM | SYSTOLIC BLOOD PRESSURE: 106 MMHG | HEART RATE: 80 BPM | TEMPERATURE: 98 F | DIASTOLIC BLOOD PRESSURE: 68 MMHG | HEIGHT: 73 IN

## 2019-09-17 DIAGNOSIS — M10.00 IDIOPATHIC GOUT, UNSPECIFIED CHRONICITY, UNSPECIFIED SITE: ICD-10-CM

## 2019-09-17 DIAGNOSIS — Z00.00 ROUTINE GENERAL MEDICAL EXAMINATION AT HEALTH CARE FACILITY: ICD-10-CM

## 2019-09-17 DIAGNOSIS — Z00.00 ROUTINE GENERAL MEDICAL EXAMINATION AT HEALTH CARE FACILITY: Primary | ICD-10-CM

## 2019-09-17 LAB
ALBUMIN SERPL BCP-MCNC: 3.8 G/DL (ref 3.5–5.2)
ALP SERPL-CCNC: 99 U/L (ref 55–135)
ALT SERPL W/O P-5'-P-CCNC: 23 U/L (ref 10–44)
ANION GAP SERPL CALC-SCNC: 13 MMOL/L (ref 8–16)
AST SERPL-CCNC: 21 U/L (ref 10–40)
BASOPHILS # BLD AUTO: 0.1 K/UL (ref 0–0.2)
BASOPHILS NFR BLD: 1.4 % (ref 0–1.9)
BILIRUB SERPL-MCNC: 0.5 MG/DL (ref 0.1–1)
BUN SERPL-MCNC: 19 MG/DL (ref 8–23)
CALCIUM SERPL-MCNC: 9.2 MG/DL (ref 8.7–10.5)
CHLORIDE SERPL-SCNC: 101 MMOL/L (ref 95–110)
CO2 SERPL-SCNC: 25 MMOL/L (ref 23–29)
CREAT SERPL-MCNC: 1.2 MG/DL (ref 0.5–1.4)
DIFFERENTIAL METHOD: ABNORMAL
EOSINOPHIL # BLD AUTO: 0.1 K/UL (ref 0–0.5)
EOSINOPHIL NFR BLD: 2 % (ref 0–8)
ERYTHROCYTE [DISTWIDTH] IN BLOOD BY AUTOMATED COUNT: 14 % (ref 11.5–14.5)
EST. GFR  (AFRICAN AMERICAN): >60 ML/MIN/1.73 M^2
EST. GFR  (NON AFRICAN AMERICAN): >60 ML/MIN/1.73 M^2
GLUCOSE SERPL-MCNC: 91 MG/DL (ref 70–110)
HCT VFR BLD AUTO: 48.6 % (ref 40–54)
HGB BLD-MCNC: 15 G/DL (ref 14–18)
IMM GRANULOCYTES # BLD AUTO: 0.02 K/UL (ref 0–0.04)
IMM GRANULOCYTES NFR BLD AUTO: 0.3 % (ref 0–0.5)
LYMPHOCYTES # BLD AUTO: 2 K/UL (ref 1–4.8)
LYMPHOCYTES NFR BLD: 27.8 % (ref 18–48)
MCH RBC QN AUTO: 29.2 PG (ref 27–31)
MCHC RBC AUTO-ENTMCNC: 30.9 G/DL (ref 32–36)
MCV RBC AUTO: 95 FL (ref 82–98)
MONOCYTES # BLD AUTO: 0.9 K/UL (ref 0.3–1)
MONOCYTES NFR BLD: 13.1 % (ref 4–15)
NEUTROPHILS # BLD AUTO: 3.9 K/UL (ref 1.8–7.7)
NEUTROPHILS NFR BLD: 55.4 % (ref 38–73)
NRBC BLD-RTO: 0 /100 WBC
PLATELET # BLD AUTO: 401 K/UL (ref 150–350)
PMV BLD AUTO: 9.9 FL (ref 9.2–12.9)
POTASSIUM SERPL-SCNC: 3.6 MMOL/L (ref 3.5–5.1)
PROT SERPL-MCNC: 7.5 G/DL (ref 6–8.4)
RBC # BLD AUTO: 5.14 M/UL (ref 4.6–6.2)
SODIUM SERPL-SCNC: 139 MMOL/L (ref 136–145)
URATE SERPL-MCNC: 10.1 MG/DL (ref 3.4–7)
WBC # BLD AUTO: 7.09 K/UL (ref 3.9–12.7)

## 2019-09-17 PROCEDURE — 85025 COMPLETE CBC W/AUTO DIFF WBC: CPT

## 2019-09-17 PROCEDURE — 3078F PR MOST RECENT DIASTOLIC BLOOD PRESSURE < 80 MM HG: ICD-10-PCS | Mod: CPTII,S$GLB,, | Performed by: PHYSICIAN ASSISTANT

## 2019-09-17 PROCEDURE — 3074F PR MOST RECENT SYSTOLIC BLOOD PRESSURE < 130 MM HG: ICD-10-PCS | Mod: CPTII,S$GLB,, | Performed by: PHYSICIAN ASSISTANT

## 2019-09-17 PROCEDURE — 84153 ASSAY OF PSA TOTAL: CPT

## 2019-09-17 PROCEDURE — 36415 COLL VENOUS BLD VENIPUNCTURE: CPT | Mod: PO

## 2019-09-17 PROCEDURE — 99999 PR PBB SHADOW E&M-EST. PATIENT-LVL IV: ICD-10-PCS | Mod: PBBFAC,,, | Performed by: PHYSICIAN ASSISTANT

## 2019-09-17 PROCEDURE — 99999 PR PBB SHADOW E&M-EST. PATIENT-LVL IV: CPT | Mod: PBBFAC,,, | Performed by: PHYSICIAN ASSISTANT

## 2019-09-17 PROCEDURE — 84550 ASSAY OF BLOOD/URIC ACID: CPT

## 2019-09-17 PROCEDURE — 99396 PR PREVENTIVE VISIT,EST,40-64: ICD-10-PCS | Mod: S$GLB,,, | Performed by: PHYSICIAN ASSISTANT

## 2019-09-17 PROCEDURE — 3074F SYST BP LT 130 MM HG: CPT | Mod: CPTII,S$GLB,, | Performed by: PHYSICIAN ASSISTANT

## 2019-09-17 PROCEDURE — 99396 PREV VISIT EST AGE 40-64: CPT | Mod: S$GLB,,, | Performed by: PHYSICIAN ASSISTANT

## 2019-09-17 PROCEDURE — 80053 COMPREHEN METABOLIC PANEL: CPT

## 2019-09-17 PROCEDURE — 3078F DIAST BP <80 MM HG: CPT | Mod: CPTII,S$GLB,, | Performed by: PHYSICIAN ASSISTANT

## 2019-09-17 NOTE — PROGRESS NOTES
Subjective:       Patient ID: Byron Styles is a 64 y.o. male.    Chief Complaint: Follow-up  Patient comes in today for check up   He is due for annual exam     He currently has hypertension, depression, hypertension.   His only complaint is that he has intermittent swelling on his ankle that he attributes to possible gout.  Last week he did take indomethacin which did help greatly.    He is overdue for some labs.  He did have his lipids checked earlier this year so we do not need to do this today.  Follow-up   Associated symptoms include arthralgias. Pertinent negatives include no chest pain, headaches, joint swelling, neck pain, numbness, rash, sore throat, swollen glands, urinary symptoms, vertigo, visual change, vomiting or weakness.       There are no preventive care reminders to display for this patient.    Past Medical History:   Diagnosis Date    Colon polyps 2009    Depression     Hyperlipidemia     Hypertension     Localized osteoarthrosis not specified whether primary or secondary, lower leg 4/6/2015    Raynaud's disease        Current Outpatient Medications   Medication Sig Dispense Refill    aspirin (ECOTRIN) 81 MG EC tablet Take 81 mg by mouth once daily.      atorvastatin (LIPITOR) 40 MG tablet TAKE 1 TABLET BY MOUTH ONCE DAILY IN THE EVENING 90 tablet 3    hydrocortisone 2.5 % cream       indomethacin (INDOCIN SR) 75 mg CpSR CR capsule TAKE ONE CAPSULE BY MOUTH TWICE DAILY WITH FOOD 60 capsule 1    losartan-hydrochlorothiazide 50-12.5 mg (HYZAAR) 50-12.5 mg per tablet Take 1 tablet by mouth once daily. 90 tablet 3    multivitamin capsule Take 1 capsule by mouth once daily.      nadolol (CORGARD) 20 MG tablet TAKE 1 TABLET BY MOUTH ONCE DAILY 90 tablet 0    sertraline (ZOLOFT) 100 MG tablet TAKE ONE TABLET BY MOUTH ONCE DAILY 30 tablet 11     No current facility-administered medications for this visit.        Review of Systems   Constitutional: Negative for activity change and  "unexpected weight change.   HENT: Negative for hearing loss, rhinorrhea, sore throat and trouble swallowing.    Eyes: Negative for discharge and visual disturbance.   Respiratory: Negative for chest tightness and wheezing.    Cardiovascular: Negative for chest pain and palpitations.   Gastrointestinal: Negative for blood in stool, constipation, diarrhea and vomiting.   Endocrine: Negative for polydipsia and polyuria.   Genitourinary: Negative for difficulty urinating, hematuria and urgency.   Musculoskeletal: Positive for arthralgias. Negative for joint swelling and neck pain.   Skin: Negative for rash.   Neurological: Negative for vertigo, weakness, numbness and headaches.   Psychiatric/Behavioral: Negative for confusion and dysphoric mood.       Objective:   /68 (BP Location: Left arm, Patient Position: Sitting)   Pulse 80   Temp 97.5 °F (36.4 °C) (Tympanic)   Resp 18   Ht 6' 1" (1.854 m)   Wt 110.9 kg (244 lb 7.8 oz)   BMI 32.26 kg/m²      Physical Exam   Constitutional: He is oriented to person, place, and time. He appears well-developed and well-nourished. No distress.   HENT:   Head: Normocephalic and atraumatic.   Right Ear: External ear normal.   Left Ear: External ear normal.   Mouth/Throat: Oropharynx is clear and moist.   Eyes: Pupils are equal, round, and reactive to light. EOM are normal.   Neck: Normal range of motion. Neck supple.   Cardiovascular: Normal rate, regular rhythm, normal heart sounds and intact distal pulses.   Pulmonary/Chest: Effort normal and breath sounds normal.   Abdominal: Soft.   Musculoskeletal: He exhibits no edema.   Neurological: He is alert and oriented to person, place, and time.   Skin: Skin is warm. Capillary refill takes less than 2 seconds. He is not diaphoretic.   Psychiatric: He has a normal mood and affect. His behavior is normal. Judgment and thought content normal.         Lab Results   Component Value Date    WBC 6.38 08/02/2018    HGB 14.4 08/02/2018 "    HCT 44.8 08/02/2018     (H) 08/02/2018    CHOL 158 03/05/2019    TRIG 277 (H) 03/05/2019    HDL 36 (L) 03/05/2019    ALT 38 08/02/2018    AST 34 08/02/2018     08/02/2018    K 3.8 08/02/2018     08/02/2018    CREATININE 1.1 08/02/2018    BUN 13 08/02/2018    CO2 28 08/02/2018    PSA 0.74 08/02/2018       Assessment:       1. Routine general medical examination at health care facility    2. Idiopathic gout, unspecified chronicity, unspecified site        Plan:   Routine general medical examination at health care facility  -     CBC auto differential; Future; Expected date: 09/17/2019  -     Comprehensive metabolic panel; Future; Expected date: 09/17/2019  -     PSA, Screening; Future; Expected date: 09/17/2019    Idiopathic gout, unspecified chronicity, unspecified site  -     Uric acid; Future; Expected date: 09/17/2019        No follow-ups on file.

## 2019-09-18 LAB — COMPLEXED PSA SERPL-MCNC: 0.57 NG/ML (ref 0–4)

## 2019-11-18 DIAGNOSIS — M25.571 ARTHRALGIA OF RIGHT FOOT: ICD-10-CM

## 2019-11-18 RX ORDER — INDOMETHACIN 75 MG/1
CAPSULE, EXTENDED RELEASE ORAL
Qty: 60 CAPSULE | Refills: 0 | Status: SHIPPED | OUTPATIENT
Start: 2019-11-18 | End: 2020-06-01

## 2020-02-27 RX ORDER — SERTRALINE HYDROCHLORIDE 100 MG/1
TABLET, FILM COATED ORAL
Qty: 90 TABLET | Refills: 3 | Status: SHIPPED | OUTPATIENT
Start: 2020-02-27 | End: 2021-03-19

## 2020-04-09 RX ORDER — LISINOPRIL AND HYDROCHLOROTHIAZIDE 12.5; 2 MG/1; MG/1
1 TABLET ORAL DAILY
Qty: 90 TABLET | Refills: 0 | Status: SHIPPED | OUTPATIENT
Start: 2020-04-09 | End: 2020-07-13

## 2020-05-30 DIAGNOSIS — M25.571 ARTHRALGIA OF RIGHT FOOT: ICD-10-CM

## 2020-06-01 RX ORDER — INDOMETHACIN 75 MG/1
CAPSULE, EXTENDED RELEASE ORAL
Qty: 60 CAPSULE | Refills: 0 | Status: SHIPPED | OUTPATIENT
Start: 2020-06-01 | End: 2020-08-03

## 2020-06-01 NOTE — TELEPHONE ENCOUNTER
Patient overdue for follow up.  Refill is given but the patient needs an appointment with Dr. Bernard or Geno Uribe for follow up.

## 2020-07-13 RX ORDER — LISINOPRIL AND HYDROCHLOROTHIAZIDE 12.5; 2 MG/1; MG/1
1 TABLET ORAL DAILY
Qty: 90 TABLET | Refills: 0 | Status: SHIPPED | OUTPATIENT
Start: 2020-07-13 | End: 2020-10-19

## 2020-08-10 ENCOUNTER — PATIENT OUTREACH (OUTPATIENT)
Dept: ADMINISTRATIVE | Facility: HOSPITAL | Age: 65
End: 2020-08-10

## 2020-08-10 NOTE — PROGRESS NOTES
[employer] Report-Not seen in 12 months     Care Everywhere-no records found.  Lab [facility]-no records found    LM advising due for annual exam. Encouraged to call or go online to schedule.

## 2020-08-19 ENCOUNTER — OFFICE VISIT (OUTPATIENT)
Dept: INTERNAL MEDICINE | Facility: CLINIC | Age: 65
End: 2020-08-19
Payer: COMMERCIAL

## 2020-08-19 VITALS
TEMPERATURE: 99 F | BODY MASS INDEX: 32.67 KG/M2 | HEART RATE: 74 BPM | WEIGHT: 246.5 LBS | DIASTOLIC BLOOD PRESSURE: 68 MMHG | SYSTOLIC BLOOD PRESSURE: 100 MMHG | HEIGHT: 73 IN

## 2020-08-19 DIAGNOSIS — M79.672 LEFT FOOT PAIN: Primary | ICD-10-CM

## 2020-08-19 DIAGNOSIS — Z12.5 ENCOUNTER FOR SCREENING FOR MALIGNANT NEOPLASM OF PROSTATE: ICD-10-CM

## 2020-08-19 DIAGNOSIS — E78.2 MIXED HYPERLIPIDEMIA: ICD-10-CM

## 2020-08-19 DIAGNOSIS — I10 ESSENTIAL HYPERTENSION: ICD-10-CM

## 2020-08-19 DIAGNOSIS — M10.072 IDIOPATHIC GOUT OF LEFT FOOT, UNSPECIFIED CHRONICITY: ICD-10-CM

## 2020-08-19 DIAGNOSIS — R22.42 LOCALIZED SWELLING OF LEFT LOWER EXTREMITY: ICD-10-CM

## 2020-08-19 PROCEDURE — 1101F PR PT FALLS ASSESS DOC 0-1 FALLS W/OUT INJ PAST YR: ICD-10-PCS | Mod: CPTII,S$GLB,, | Performed by: INTERNAL MEDICINE

## 2020-08-19 PROCEDURE — 3078F PR MOST RECENT DIASTOLIC BLOOD PRESSURE < 80 MM HG: ICD-10-PCS | Mod: CPTII,S$GLB,, | Performed by: INTERNAL MEDICINE

## 2020-08-19 PROCEDURE — 3074F SYST BP LT 130 MM HG: CPT | Mod: CPTII,S$GLB,, | Performed by: INTERNAL MEDICINE

## 2020-08-19 PROCEDURE — 99999 PR PBB SHADOW E&M-EST. PATIENT-LVL IV: ICD-10-PCS | Mod: PBBFAC,,, | Performed by: INTERNAL MEDICINE

## 2020-08-19 PROCEDURE — 3008F PR BODY MASS INDEX (BMI) DOCUMENTED: ICD-10-PCS | Mod: CPTII,S$GLB,, | Performed by: INTERNAL MEDICINE

## 2020-08-19 PROCEDURE — 1101F PT FALLS ASSESS-DOCD LE1/YR: CPT | Mod: CPTII,S$GLB,, | Performed by: INTERNAL MEDICINE

## 2020-08-19 PROCEDURE — 99214 PR OFFICE/OUTPT VISIT, EST, LEVL IV, 30-39 MIN: ICD-10-PCS | Mod: S$GLB,,, | Performed by: INTERNAL MEDICINE

## 2020-08-19 PROCEDURE — 3074F PR MOST RECENT SYSTOLIC BLOOD PRESSURE < 130 MM HG: ICD-10-PCS | Mod: CPTII,S$GLB,, | Performed by: INTERNAL MEDICINE

## 2020-08-19 PROCEDURE — 3008F BODY MASS INDEX DOCD: CPT | Mod: CPTII,S$GLB,, | Performed by: INTERNAL MEDICINE

## 2020-08-19 PROCEDURE — 99214 OFFICE O/P EST MOD 30 MIN: CPT | Mod: S$GLB,,, | Performed by: INTERNAL MEDICINE

## 2020-08-19 PROCEDURE — 99999 PR PBB SHADOW E&M-EST. PATIENT-LVL IV: CPT | Mod: PBBFAC,,, | Performed by: INTERNAL MEDICINE

## 2020-08-19 PROCEDURE — 3078F DIAST BP <80 MM HG: CPT | Mod: CPTII,S$GLB,, | Performed by: INTERNAL MEDICINE

## 2020-08-19 NOTE — PROGRESS NOTES
Subjective:       Patient ID: Byron Styles is a 65 y.o. male.    Chief Complaint: Heel Pain (left)    HPI Patient is a 65-year-old male presenting today with complaints of left foot pain.  He states that he has had issues with some intermittent pain and swelling in his left foot over the last several months.  He states that 1st he feels like he notices swelling.  He was notes some swelling in the left foot that would come on without any sort of provocation last for a few days and go away.  He related no history of trauma or injury.  He has a history of gout but he would not say that the swelling occurred when he was having gout attacks per se.    He also started having some pain in the left foot.  He states that he not would note that he would have some significant pain around the left lateral aspect of the left foot that might occur and be quite uncomfortable for weight-bearing and things of that nature.  It would typically last a did a day or 2 and then the swelling went come on after that.  Again no history of trauma or injury is associated with these symptoms.    Patient has history of hypertension.  He indicates he has been taking his medications as prescribed.  His blood pressure remains under good control.    He has had a history of hyperlipidemia as well.  He notes no issues with his cholesterol that he is aware of.  He does try to follow a healthy diet.  He is due for updated lab work.    As noted he has had a history of gout which tends to affect his left great toe region.  Right now he is noting no major issue.  He states he has little bit of tenderness in the left great toe but nothing significant at this moment.    Review of Systems   Constitutional: Negative for fever and unexpected weight change.   HENT: Negative for hearing loss, postnasal drip and rhinorrhea.    Eyes: Negative for pain and visual disturbance.   Respiratory: Negative for cough, shortness of breath and wheezing.   "  Cardiovascular: Positive for leg swelling. Negative for chest pain and palpitations.   Gastrointestinal: Negative for constipation, diarrhea, nausea and vomiting.   Genitourinary: Negative for dysuria and hematuria.   Musculoskeletal: Positive for arthralgias. Negative for back pain, myalgias and neck stiffness.   Skin: Negative for pallor and rash.   Neurological: Negative for seizures, syncope and headaches.   Hematological: Negative for adenopathy.   Psychiatric/Behavioral: Negative for dysphoric mood. The patient is not nervous/anxious.        Objective:   /68   Pulse 74   Temp 98.5 °F (36.9 °C)   Ht 6' 1" (1.854 m)   Wt 111.8 kg (246 lb 7.6 oz)   BMI 32.52 kg/m²      Physical Exam  Vitals signs reviewed.   Constitutional:       General: He is not in acute distress.     Appearance: He is well-developed.   HENT:      Head: Normocephalic and atraumatic.      Right Ear: Tympanic membrane and ear canal normal.      Left Ear: Tympanic membrane and ear canal normal.   Eyes:      Pupils: Pupils are equal, round, and reactive to light.   Neck:      Musculoskeletal: Normal range of motion and neck supple.      Thyroid: No thyromegaly.      Vascular: No JVD.   Cardiovascular:      Rate and Rhythm: Normal rate and regular rhythm.      Heart sounds: Normal heart sounds. No murmur. No friction rub. No gallop.    Pulmonary:      Effort: Pulmonary effort is normal.      Breath sounds: Normal breath sounds. No wheezing or rales.   Abdominal:      General: Bowel sounds are normal. There is no distension.      Palpations: Abdomen is soft.      Tenderness: There is no abdominal tenderness. There is no guarding or rebound.   Musculoskeletal: Normal range of motion.      Comments: Examination left foot reveals 1+ so edema across the dorsum of the foot to just proximal of the ankle.  It is pitting.  There is mild erythema over the 1st MCP with mild tenderness but no significant warmth.  No tenderness to palpation along " the lateral mass margin of the left foot.  Popliteal pulses are intact.   Lymphadenopathy:      Cervical: No cervical adenopathy.   Skin:     General: Skin is warm and dry.      Findings: No rash.   Neurological:      General: No focal deficit present.      Mental Status: He is alert and oriented to person, place, and time.      Cranial Nerves: No cranial nerve deficit.      Deep Tendon Reflexes: Reflexes are normal and symmetric.   Psychiatric:         Mood and Affect: Mood normal.         Judgment: Judgment normal.             Assessment:       1. Left foot pain    2. Localized swelling of left lower extremity    3. Essential hypertension    4. Mixed hyperlipidemia    5. Idiopathic gout of left foot, unspecified chronicity    6. Encounter for screening for malignant neoplasm of prostate        Plan:   Essential hypertension  Blood pressure is under good control.  We will continue the current regimen.  Will work on regular aerobic exercise and a low salt diet.        Left foot pain  Comments:  xray left foot.  Use indomethacin for discomfort as prescribed  Orders:  -     X-Ray Foot Complete Left; Future; Expected date: 08/19/2020    Localized swelling of left lower extremity  Comments:  Ultrasound left lower extremity veins  Orders:  -     US Lower Extremity Veins Left; Future; Expected date: 08/19/2020    Essential hypertension  -     CBC auto differential; Future; Expected date: 08/24/2020  -     Comprehensive metabolic panel; Future; Expected date: 08/24/2020    Mixed hyperlipidemia  -     Lipid Panel; Future; Expected date: 08/24/2020    Idiopathic gout of left foot, unspecified chronicity  -     Uric acid; Future; Expected date: 08/24/2020    Encounter for screening for malignant neoplasm of prostate  -     PSA, Screening; Future; Expected date: 08/24/2020     Unclear I etiology to the pain in the foot as well as the swelling at this time.  We will do an x-ray to look for underlying bony issues that could be  contributing to the intermittent pain and will move forward with a ultrasound to evaluate for DVT.  He is due for updated lab work so will get those scheduled in the near future and will see him back in follow-up.  If no clear etiology has been determined that time we will proceed with further workup.      Follow up in about 3 weeks (around 9/9/2020).

## 2020-08-19 NOTE — PATIENT INSTRUCTIONS

## 2020-08-24 ENCOUNTER — HOSPITAL ENCOUNTER (OUTPATIENT)
Dept: RADIOLOGY | Facility: HOSPITAL | Age: 65
Discharge: HOME | End: 2020-08-24
Attending: INTERNAL MEDICINE
Payer: COMMERCIAL

## 2020-08-24 DIAGNOSIS — M79.672 LEFT FOOT PAIN: ICD-10-CM

## 2020-08-24 PROCEDURE — 73630 X-RAY EXAM OF FOOT: CPT | Mod: 26,LT,, | Performed by: RADIOLOGY

## 2020-08-24 PROCEDURE — 73630 X-RAY EXAM OF FOOT: CPT | Mod: TC,FY,PO,LT

## 2020-08-24 PROCEDURE — 73630 XR FOOT COMPLETE 3 VIEW LEFT: ICD-10-PCS | Mod: 26,LT,, | Performed by: RADIOLOGY

## 2020-08-25 ENCOUNTER — PATIENT MESSAGE (OUTPATIENT)
Dept: INTERNAL MEDICINE | Facility: CLINIC | Age: 65
End: 2020-08-25

## 2020-08-25 DIAGNOSIS — M10.072 IDIOPATHIC GOUT OF LEFT FOOT, UNSPECIFIED CHRONICITY: ICD-10-CM

## 2020-08-25 DIAGNOSIS — D75.839 THROMBOCYTOSIS: Primary | ICD-10-CM

## 2020-08-25 RX ORDER — FEBUXOSTAT 40 MG/1
40 TABLET, FILM COATED ORAL DAILY
Qty: 90 TABLET | Refills: 3 | Status: SHIPPED | OUTPATIENT
Start: 2020-08-25 | End: 2021-10-14

## 2020-08-26 ENCOUNTER — TELEPHONE (OUTPATIENT)
Dept: RADIOLOGY | Facility: HOSPITAL | Age: 65
End: 2020-08-26

## 2020-08-27 ENCOUNTER — HOSPITAL ENCOUNTER (OUTPATIENT)
Dept: RADIOLOGY | Facility: HOSPITAL | Age: 65
Discharge: HOME | End: 2020-08-27
Attending: INTERNAL MEDICINE
Payer: COMMERCIAL

## 2020-08-27 DIAGNOSIS — R22.42 LOCALIZED SWELLING OF LEFT LOWER EXTREMITY: ICD-10-CM

## 2020-08-27 PROCEDURE — 93971 US LOWER EXTREMITY VEINS LEFT: ICD-10-PCS | Mod: 26,LT,, | Performed by: RADIOLOGY

## 2020-08-27 PROCEDURE — 93971 EXTREMITY STUDY: CPT | Mod: 26,LT,, | Performed by: RADIOLOGY

## 2020-08-27 PROCEDURE — 93971 EXTREMITY STUDY: CPT | Mod: TC,LT

## 2020-09-01 ENCOUNTER — LAB VISIT (OUTPATIENT)
Dept: LAB | Facility: HOSPITAL | Age: 65
End: 2020-09-01
Attending: INTERNAL MEDICINE
Payer: COMMERCIAL

## 2020-09-01 ENCOUNTER — OFFICE VISIT (OUTPATIENT)
Dept: HEMATOLOGY/ONCOLOGY | Facility: CLINIC | Age: 65
End: 2020-09-01
Payer: COMMERCIAL

## 2020-09-01 VITALS
DIASTOLIC BLOOD PRESSURE: 90 MMHG | SYSTOLIC BLOOD PRESSURE: 134 MMHG | WEIGHT: 246.69 LBS | OXYGEN SATURATION: 98 % | HEART RATE: 76 BPM | BODY MASS INDEX: 32.7 KG/M2 | RESPIRATION RATE: 17 BRPM | HEIGHT: 73 IN | TEMPERATURE: 98 F

## 2020-09-01 DIAGNOSIS — Z72.0 TOBACCO ABUSE: ICD-10-CM

## 2020-09-01 DIAGNOSIS — Z12.11 SCREENING FOR COLON CANCER: ICD-10-CM

## 2020-09-01 DIAGNOSIS — D64.9 ANEMIA, UNSPECIFIED TYPE: Primary | ICD-10-CM

## 2020-09-01 DIAGNOSIS — D75.839 THROMBOCYTOSIS: ICD-10-CM

## 2020-09-01 DIAGNOSIS — D64.9 ANEMIA, UNSPECIFIED TYPE: ICD-10-CM

## 2020-09-01 DIAGNOSIS — M10.9 ACUTE GOUT OF LEFT ANKLE, UNSPECIFIED CAUSE: ICD-10-CM

## 2020-09-01 LAB
BASOPHILS # BLD AUTO: 0.09 K/UL (ref 0–0.2)
BASOPHILS NFR BLD: 1.2 % (ref 0–1.9)
DIFFERENTIAL METHOD: ABNORMAL
EOSINOPHIL # BLD AUTO: 0.2 K/UL (ref 0–0.5)
EOSINOPHIL NFR BLD: 3 % (ref 0–8)
ERYTHROCYTE [DISTWIDTH] IN BLOOD BY AUTOMATED COUNT: 13.2 % (ref 11.5–14.5)
HCT VFR BLD AUTO: 41.1 % (ref 40–54)
HGB BLD-MCNC: 13.7 G/DL (ref 14–18)
IMM GRANULOCYTES # BLD AUTO: 0.03 K/UL (ref 0–0.04)
IMM GRANULOCYTES NFR BLD AUTO: 0.4 % (ref 0–0.5)
LYMPHOCYTES # BLD AUTO: 1.7 K/UL (ref 1–4.8)
LYMPHOCYTES NFR BLD: 22.9 % (ref 18–48)
MCH RBC QN AUTO: 29 PG (ref 27–31)
MCHC RBC AUTO-ENTMCNC: 33.3 G/DL (ref 32–36)
MCV RBC AUTO: 87 FL (ref 82–98)
MONOCYTES # BLD AUTO: 0.8 K/UL (ref 0.3–1)
MONOCYTES NFR BLD: 11.5 % (ref 4–15)
NEUTROPHILS # BLD AUTO: 4.5 K/UL (ref 1.8–7.7)
NEUTROPHILS NFR BLD: 61.4 % (ref 38–73)
NRBC BLD-RTO: 0 /100 WBC
PLATELET # BLD AUTO: 430 K/UL (ref 150–350)
PMV BLD AUTO: 8.3 FL (ref 9.2–12.9)
RBC # BLD AUTO: 4.73 M/UL (ref 4.6–6.2)
WBC # BLD AUTO: 7.28 K/UL (ref 3.9–12.7)

## 2020-09-01 PROCEDURE — 99204 OFFICE O/P NEW MOD 45 MIN: CPT | Mod: S$GLB,,, | Performed by: INTERNAL MEDICINE

## 2020-09-01 PROCEDURE — 99999 PR PBB SHADOW E&M-EST. PATIENT-LVL V: CPT | Mod: PBBFAC,,, | Performed by: INTERNAL MEDICINE

## 2020-09-01 PROCEDURE — 3075F PR MOST RECENT SYSTOLIC BLOOD PRESS GE 130-139MM HG: ICD-10-PCS | Mod: CPTII,S$GLB,, | Performed by: INTERNAL MEDICINE

## 2020-09-01 PROCEDURE — 3080F PR MOST RECENT DIASTOLIC BLOOD PRESSURE >= 90 MM HG: ICD-10-PCS | Mod: CPTII,S$GLB,, | Performed by: INTERNAL MEDICINE

## 2020-09-01 PROCEDURE — 82728 ASSAY OF FERRITIN: CPT

## 2020-09-01 PROCEDURE — 99406 PR TOBACCO USE CESSATION INTERMEDIATE 3-10 MINUTES: ICD-10-PCS | Mod: S$GLB,,, | Performed by: INTERNAL MEDICINE

## 2020-09-01 PROCEDURE — 36415 COLL VENOUS BLD VENIPUNCTURE: CPT

## 2020-09-01 PROCEDURE — 3008F PR BODY MASS INDEX (BMI) DOCUMENTED: ICD-10-PCS | Mod: CPTII,S$GLB,, | Performed by: INTERNAL MEDICINE

## 2020-09-01 PROCEDURE — 3080F DIAST BP >= 90 MM HG: CPT | Mod: CPTII,S$GLB,, | Performed by: INTERNAL MEDICINE

## 2020-09-01 PROCEDURE — 99406 BEHAV CHNG SMOKING 3-10 MIN: CPT | Mod: S$GLB,,, | Performed by: INTERNAL MEDICINE

## 2020-09-01 PROCEDURE — 99204 PR OFFICE/OUTPT VISIT, NEW, LEVL IV, 45-59 MIN: ICD-10-PCS | Mod: S$GLB,,, | Performed by: INTERNAL MEDICINE

## 2020-09-01 PROCEDURE — 1101F PR PT FALLS ASSESS DOC 0-1 FALLS W/OUT INJ PAST YR: ICD-10-PCS | Mod: CPTII,S$GLB,, | Performed by: INTERNAL MEDICINE

## 2020-09-01 PROCEDURE — 3008F BODY MASS INDEX DOCD: CPT | Mod: CPTII,S$GLB,, | Performed by: INTERNAL MEDICINE

## 2020-09-01 PROCEDURE — 85025 COMPLETE CBC W/AUTO DIFF WBC: CPT

## 2020-09-01 PROCEDURE — 99999 PR PBB SHADOW E&M-EST. PATIENT-LVL V: ICD-10-PCS | Mod: PBBFAC,,, | Performed by: INTERNAL MEDICINE

## 2020-09-01 PROCEDURE — 83540 ASSAY OF IRON: CPT

## 2020-09-01 PROCEDURE — 1101F PT FALLS ASSESS-DOCD LE1/YR: CPT | Mod: CPTII,S$GLB,, | Performed by: INTERNAL MEDICINE

## 2020-09-01 PROCEDURE — 3075F SYST BP GE 130 - 139MM HG: CPT | Mod: CPTII,S$GLB,, | Performed by: INTERNAL MEDICINE

## 2020-09-01 NOTE — PROGRESS NOTES
Subjective:      DATE OF VISIT: 9/1/20     ?  Patient ID:?Byron Styles is a 65 y.o. male.?? MR#: [id number]   ?   REFERRING PROVIDER: Angela Bernard  [address] [id number]     ? Primary Care Providers:  Ravindra Bernard MD, MD (General)     CHIEF COMPLAINT: ?Thrombocytosis, anemia  ?   HPI    Mr. Styles is a 66 y/o male with HTN and Hyperlipidemia that presents for further evaluation of elevated platelet count and anemia.  Pt endorses significant aspirin and Indomethacin use for previous month due to an acute gout flair on left foot.  No use of PPI or other gastro-protective medications. He states that the foot was significantly inflamed on and off for a year, but with continued improvement, continues to have mild edema left ankle greater than right.  Pt endorses redness in skin secondary to rosacea.  He denies any changes in stool color, unintentional weight loss, pruritis or any recent surgery/trauma.  Last colonoscopy (with polyptectcomy) was in 2009.  Prominent chewing tobacco use since 15 years old, not interested in quitting.  States he recently had a skin lesion taken out of L arm in 5/2020 from a dermatologist outside of [facility].        Review of Systems   Constitutional: Negative for chills, fever, malaise/fatigue and weight loss.   Respiratory: Negative for cough and hemoptysis.    Cardiovascular: Negative for chest pain and palpitations.   Gastrointestinal: Negative for abdominal pain, blood in stool, constipation, diarrhea, heartburn, melena, nausea and vomiting.   Genitourinary: Negative for hematuria.   Musculoskeletal: Positive for joint pain.   Allergic/Immunologic: Negative for environmental allergies.   Hematological: Does not bruise/bleed easily.       ?   A comprehensive 14-point review of systems was reviewed with patient and was negative other than as specified above.   ?   PAST MEDICAL HISTORY:   Past Medical History:   Diagnosis Date    Colon polyps 2009     Depression     Hyperlipidemia     Hypertension     Localized osteoarthrosis not specified whether primary or secondary, lower leg 4/6/2015    Raynaud's disease     Skin cancer of arm, left 05/2020    [facility]    ?     PAST SURGICAL HISTORY:   Past Surgical History:   Procedure Laterality Date    COLONOSCOPY W/ POLYPECTOMY  2009    MOUTH SURGERY      WISDOM TOOTH EXTRACTION        ?   ALLERGIES:   Allergies as of 09/01/2020    (No Known Allergies)      ?   MEDICATIONS:?   Outpatient Medications Marked as Taking for the 9/1/20 encounter (Office Visit) with Domenica Strauss MD   Medication Sig Dispense Refill    aspirin (ECOTRIN) 81 MG EC tablet Take 81 mg by mouth once daily.      atorvastatin (LIPITOR) 40 MG tablet TAKE 1 TABLET BY MOUTH ONCE DAILY IN THE EVENING 90 tablet 3    febuxostat (ULORIC) 40 mg Tab Take 1 tablet (40 mg total) by mouth once daily. 90 tablet 3    hydrocortisone 2.5 % cream       indomethacin (INDOCIN SR) 75 mg CpSR CR capsule TAKE 1 CAPSULE BY MOUTH TWICE DAILY WITH FOOD 60 capsule 0    lisinopriL-hydrochlorothiazide (PRINZIDE,ZESTORETIC) 20-12.5 mg per tablet Take 1 tablet by mouth once daily 90 tablet 0    multivitamin capsule Take 1 capsule by mouth once daily.      nadolol (CORGARD) 20 MG tablet TAKE 1 TABLET BY MOUTH ONCE DAILY 90 tablet 0    sertraline (ZOLOFT) 100 MG tablet TAKE 1 TABLET BY MOUTH ONCE DAILY 90 tablet 3      ?   SOCIAL HISTORY:?   Social History     Tobacco Use    Smoking status: Never Smoker    Smokeless tobacco: Current User     Types: Snuff   Substance Use Topics    Alcohol use: Yes     Comment: social/ occassional      ?    Chewing tobacco use since 15 years old  ?   FAMILY HISTORY:   family history includes Cancer in his father; Depression in his mother; Heart disease in his mother; Heart disease (age of onset: 65) in his father; Heart disease (age of onset: 68) in his brother.   ?   Breast Cancer-Sister      ?   Vitals:    09/01/20  1053   BP: (!) 134/90   Pulse: 76   Resp: 17   Temp: 97.5 °F (36.4 °C)      ?   ECOG:?0   General appearance: Generally well appearing, in no acute distress.   Head, eyes, ears, nose, and throat: Pupils round and equally reactive to light. Oropharynx clear with moist mucous membranes.   Lymph: No palpable cervical or supraclavicular lymphadenopathy.   Cardiovascular: Regular rate and rhythm, S1, S2, no audible murmurs.   Respiratory: Lungs clear to auscultation bilaterally.   Abdomen:  Protuberant, nontender.  Extremities: Warm, with edema left ankle.  Neurologic: Alert and oriented. Grossly normal strength, coordination, and gait.   Skin: No rashes, ecchymoses or petechial lesion.  Skin lesions small white scratchy lesions on extremities consistent with actinic keratosis, rosacea on cheeks  ?   ?   Laboratory:  ?   Lab Visit on 09/01/2020   Component Date Value Ref Range Status    WBC 09/01/2020 7.28  3.90 - 12.70 K/uL Final    RBC 09/01/2020 4.73  4.60 - 6.20 M/uL Final    Hemoglobin 09/01/2020 13.7* 14.0 - 18.0 g/dL Final    Hematocrit 09/01/2020 41.1  40.0 - 54.0 % Final    Mean Corpuscular Volume 09/01/2020 87  82 - 98 fL Final    Mean Corpuscular Hemoglobin 09/01/2020 29.0  27.0 - 31.0 pg Final    Mean Corpuscular Hemoglobin Conc 09/01/2020 33.3  32.0 - 36.0 g/dL Final    RDW 09/01/2020 13.2  11.5 - 14.5 % Final    Platelets 09/01/2020 430* 150 - 350 K/uL Final    MPV 09/01/2020 8.3* 9.2 - 12.9 fL Final    Immature Granulocytes 09/01/2020 0.4  0.0 - 0.5 % Final    Gran # (ANC) 09/01/2020 4.5  1.8 - 7.7 K/uL Final    Immature Grans (Abs) 09/01/2020 0.03  0.00 - 0.04 K/uL Final    Lymph # 09/01/2020 1.7  1.0 - 4.8 K/uL Final    Mono # 09/01/2020 0.8  0.3 - 1.0 K/uL Final    Eos # 09/01/2020 0.2  0.0 - 0.5 K/uL Final    Baso # 09/01/2020 0.09  0.00 - 0.20 K/uL Final    nRBC 09/01/2020 0  0 /100 WBC Final    Gran% 09/01/2020 61.4  38.0 - 73.0 % Final    Lymph% 09/01/2020 22.9  18.0 - 48.0 %  Final    Mono% 09/01/2020 11.5  4.0 - 15.0 % Final    Eosinophil% 09/01/2020 3.0  0.0 - 8.0 % Final    Basophil% 09/01/2020 1.2  0.0 - 1.9 % Final    Differential Method 09/01/2020 Automated   Final          ?   Assessment/Plan:       1. Anemia, unspecified type    2. Thrombocytosis    3. Acute gout of left ankle, unspecified cause    4. Screening for colon cancer          Plan:     # Anemia, unspecified type:  Mild anemia hemoglobin 13, normocytic with concomitant thrombocytosis suggest potential iron deficiency anemia.  No clinical evidence of bleeding or reflux symptoms.  Risk factor of NSAID and aspirin use with multiple gout flares over the years, no PPI. He is overdue for screening colonoscopy, last 2009, and would recommend at least this; if iron deficiency anemia on lab work would get full GI evaluation with EGD and colonoscopy.  Iron indices ordered after clinic visit today      #Thrombocytosis:  Mild thrombocytosis platelet count 4 0s may be reactive/inflammatory with ongoing gout flare and left foot.  Differential also includes iron deficiency anemia, see above.  We did discuss potential bone marrow neoplastic process although feel this is less likely.  Further workup pending iron evaluation per above and further trending with resolution of gout flare.    # Tobacco abuse/Age-appropriate cancer screening:  Overdue for colonoscopy,last 2009 per pt report, may requiring screenign EGD/colo per above.  - PSA 8/2020 within normal limits.  - counseled for least 3 min on the importance of tobacco cessation, not interested in this time.  - recommend routine follow-up with Dermatology, patient notes history of skin cancer, suspect actinic keratoses on exam    Follow-up:  Labs today, call with results  RV/ pending labs

## 2020-09-02 LAB
FERRITIN SERPL-MCNC: 137 NG/ML (ref 20–300)
IRON SERPL-MCNC: 68 UG/DL (ref 45–160)
SATURATED IRON: 18 % (ref 20–50)
TOTAL IRON BINDING CAPACITY: 382 UG/DL (ref 250–450)
TRANSFERRIN SERPL-MCNC: 258 MG/DL (ref 200–375)

## 2020-09-03 DIAGNOSIS — D64.9 ANEMIA, UNSPECIFIED TYPE: Primary | ICD-10-CM

## 2020-09-10 ENCOUNTER — OFFICE VISIT (OUTPATIENT)
Dept: INTERNAL MEDICINE | Facility: CLINIC | Age: 65
End: 2020-09-10
Payer: COMMERCIAL

## 2020-09-10 ENCOUNTER — HOSPITAL ENCOUNTER (OUTPATIENT)
Dept: RADIOLOGY | Facility: HOSPITAL | Age: 65
Discharge: HOME | End: 2020-09-10
Attending: INTERNAL MEDICINE
Payer: COMMERCIAL

## 2020-09-10 VITALS
SYSTOLIC BLOOD PRESSURE: 122 MMHG | TEMPERATURE: 99 F | WEIGHT: 243.19 LBS | HEART RATE: 76 BPM | BODY MASS INDEX: 32.23 KG/M2 | HEIGHT: 73 IN | DIASTOLIC BLOOD PRESSURE: 80 MMHG

## 2020-09-10 DIAGNOSIS — M17.0 PRIMARY OSTEOARTHRITIS OF BOTH KNEES: ICD-10-CM

## 2020-09-10 DIAGNOSIS — D75.839 THROMBOCYTOSIS: ICD-10-CM

## 2020-09-10 DIAGNOSIS — I10 ESSENTIAL HYPERTENSION: Primary | ICD-10-CM

## 2020-09-10 DIAGNOSIS — M1A.0710 IDIOPATHIC CHRONIC GOUT OF RIGHT FOOT WITHOUT TOPHUS: ICD-10-CM

## 2020-09-10 PROCEDURE — 73562 X-RAY EXAM OF KNEE 3: CPT | Mod: TC,50,FY,PO

## 2020-09-10 PROCEDURE — 99214 PR OFFICE/OUTPT VISIT, EST, LEVL IV, 30-39 MIN: ICD-10-PCS | Mod: S$GLB,,, | Performed by: INTERNAL MEDICINE

## 2020-09-10 PROCEDURE — 73562 X-RAY EXAM OF KNEE 3: CPT | Mod: 26,,, | Performed by: RADIOLOGY

## 2020-09-10 PROCEDURE — 3079F DIAST BP 80-89 MM HG: CPT | Mod: CPTII,S$GLB,, | Performed by: INTERNAL MEDICINE

## 2020-09-10 PROCEDURE — 1101F PR PT FALLS ASSESS DOC 0-1 FALLS W/OUT INJ PAST YR: ICD-10-PCS | Mod: CPTII,S$GLB,, | Performed by: INTERNAL MEDICINE

## 2020-09-10 PROCEDURE — 99999 PR PBB SHADOW E&M-EST. PATIENT-LVL IV: CPT | Mod: PBBFAC,,, | Performed by: INTERNAL MEDICINE

## 2020-09-10 PROCEDURE — 99999 PR PBB SHADOW E&M-EST. PATIENT-LVL IV: ICD-10-PCS | Mod: PBBFAC,,, | Performed by: INTERNAL MEDICINE

## 2020-09-10 PROCEDURE — 3074F PR MOST RECENT SYSTOLIC BLOOD PRESSURE < 130 MM HG: ICD-10-PCS | Mod: CPTII,S$GLB,, | Performed by: INTERNAL MEDICINE

## 2020-09-10 PROCEDURE — 99214 OFFICE O/P EST MOD 30 MIN: CPT | Mod: S$GLB,,, | Performed by: INTERNAL MEDICINE

## 2020-09-10 PROCEDURE — 3079F PR MOST RECENT DIASTOLIC BLOOD PRESSURE 80-89 MM HG: ICD-10-PCS | Mod: CPTII,S$GLB,, | Performed by: INTERNAL MEDICINE

## 2020-09-10 PROCEDURE — 1101F PT FALLS ASSESS-DOCD LE1/YR: CPT | Mod: CPTII,S$GLB,, | Performed by: INTERNAL MEDICINE

## 2020-09-10 PROCEDURE — 73562 XR KNEE ORTHO BILAT: ICD-10-PCS | Mod: 26,,, | Performed by: RADIOLOGY

## 2020-09-10 PROCEDURE — 3074F SYST BP LT 130 MM HG: CPT | Mod: CPTII,S$GLB,, | Performed by: INTERNAL MEDICINE

## 2020-09-10 PROCEDURE — 3008F BODY MASS INDEX DOCD: CPT | Mod: CPTII,S$GLB,, | Performed by: INTERNAL MEDICINE

## 2020-09-10 PROCEDURE — 3008F PR BODY MASS INDEX (BMI) DOCUMENTED: ICD-10-PCS | Mod: CPTII,S$GLB,, | Performed by: INTERNAL MEDICINE

## 2020-09-10 NOTE — PROGRESS NOTES
"Subjective:       Patient ID: Byron Styles is a 65 y.o. male.    Chief Complaint: Follow-up (3 week )    HPI Patient is a 65-year-old male presenting today following up on his hypertension, gout, osteoarthritis in knees and his thrombocytosis and iron deficiency.    Blood pressure seems to be doing well he has not had any issues at this time.  He is tolerating the medications without adverse effects.    He is feels like his gout is doing better.  He had been on indomethacin for episodic treatment of the gout.  We talked about trying and Uloric to see we can suppress the uric acid and decrease the frequency of episodes and he has been using it.  He is comfortable with it.  He feels like it is been beneficial.    He struggles with osteoarthritis in the knees.  He states he saw an orthopedist several years ago but he is really not seen anybody since then.  He there was discussion at that time that his knee joints were relatively the significantly impaired and that he might need need placement.  He is not a year about having surgery.  He did not follow-up with the orthopedist.  He has not been tried with any steroid injections or Synvisc or other options.    He has seen hematology for his regular is on his blood counts.  It is felt that this is probably just due to chronic mild iron deficiency.  They recommended over-the-counter iron supplementation.  He is scheduled to have a colonoscopy done to look for bleeding sources.    Review of Systems   Constitutional: Negative for fever and unexpected weight change.   Respiratory: Negative for cough, shortness of breath and wheezing.    Cardiovascular: Negative for chest pain and palpitations.   Gastrointestinal: Negative for constipation, diarrhea, nausea and vomiting.   Genitourinary: Negative for dysuria and hematuria.       Objective:   /80   Pulse 76   Temp 99 °F (37.2 °C) (Temporal)   Ht 6' 1" (1.854 m)   Wt 110.3 kg (243 lb 2.7 oz)   BMI 32.08 kg/m²    "   Physical Exam  Vitals signs reviewed.   Constitutional:       Appearance: He is well-developed.   HENT:      Head: Normocephalic and atraumatic.      Right Ear: Tympanic membrane, ear canal and external ear normal.      Left Ear: Tympanic membrane, ear canal and external ear normal.   Eyes:      Pupils: Pupils are equal, round, and reactive to light.   Neck:      Musculoskeletal: Neck supple.      Thyroid: No thyromegaly.   Cardiovascular:      Rate and Rhythm: Normal rate and regular rhythm.      Heart sounds: Normal heart sounds. No murmur. No friction rub. No gallop.    Pulmonary:      Effort: Pulmonary effort is normal.      Breath sounds: Normal breath sounds. No wheezing or rales.   Abdominal:      General: Bowel sounds are normal. There is no distension.      Palpations: Abdomen is soft.      Tenderness: There is no abdominal tenderness.   Psychiatric:         Mood and Affect: Mood normal.         Lab Visit on 09/01/2020   Component Date Value    Ferritin 09/01/2020 137     WBC 09/01/2020 7.28     RBC 09/01/2020 4.73     Hemoglobin 09/01/2020 13.7*    Hematocrit 09/01/2020 41.1     Mean Corpuscular Volume 09/01/2020 87     Mean Corpuscular Hemoglo* 09/01/2020 29.0     Mean Corpuscular Hemoglo* 09/01/2020 33.3     RDW 09/01/2020 13.2     Platelets 09/01/2020 430*    MPV 09/01/2020 8.3*    Immature Granulocytes 09/01/2020 0.4     Gran # (ANC) 09/01/2020 4.5     Immature Grans (Abs) 09/01/2020 0.03     Lymph # 09/01/2020 1.7     Mono # 09/01/2020 0.8     Eos # 09/01/2020 0.2     Baso # 09/01/2020 0.09     nRBC 09/01/2020 0     Gran% 09/01/2020 61.4     Lymph% 09/01/2020 22.9     Mono% 09/01/2020 11.5     Eosinophil% 09/01/2020 3.0     Basophil% 09/01/2020 1.2     Differential Method 09/01/2020 Automated     Iron 09/01/2020 68     Transferrin 09/01/2020 258     TIBC 09/01/2020 382     Saturated Iron 09/01/2020 18*       Assessment:       1. Essential hypertension    2. Idiopathic  chronic gout of right foot without tophus    3. Primary osteoarthritis of both knees    4. Thrombocytosis        Plan:   Essential hypertension  Blood pressure is under good control.  We will continue the current regimen.  Will work on regular aerobic exercise and a low salt diet.        Essential hypertension    Idiopathic chronic gout of right foot without tophus  Comments:  ON uloric for suppression at this time.  Tolerating it well.    Primary osteoarthritis of both knees  Comments:  Continues to have some issues with arthritis in the knees.  Refer to ortho  Orders:  -     Ambulatory referral/consult to Orthopedics; Future; Expected date: 09/17/2020  -     X-ray Knee Ortho Bilateral; Future; Expected date: 09/10/2020    Thrombocytosis  Comments:  following with Dr. Strauss, continue with workup          Follow up in about 6 months (around 3/10/2021).

## 2020-09-11 ENCOUNTER — TELEPHONE (OUTPATIENT)
Dept: ORTHOPEDICS | Facility: CLINIC | Age: 65
End: 2020-09-11

## 2020-09-12 ENCOUNTER — PATIENT MESSAGE (OUTPATIENT)
Dept: INTERNAL MEDICINE | Facility: CLINIC | Age: 65
End: 2020-09-12

## 2020-09-12 DIAGNOSIS — M79.672 LEFT FOOT PAIN: Primary | ICD-10-CM

## 2020-09-14 NOTE — TELEPHONE ENCOUNTER
Pt scheduled for Podiatry appt. Offered pt appt today however pt declined. Pt has been scheduled for requested day.

## 2020-09-15 ENCOUNTER — PATIENT MESSAGE (OUTPATIENT)
Dept: INTERNAL MEDICINE | Facility: CLINIC | Age: 65
End: 2020-09-15

## 2020-09-24 ENCOUNTER — IMMUNIZATION (OUTPATIENT)
Dept: PHARMACY | Facility: CLINIC | Age: 65
End: 2020-09-24
Payer: COMMERCIAL

## 2020-09-24 ENCOUNTER — HOSPITAL ENCOUNTER (OUTPATIENT)
Dept: RADIOLOGY | Facility: HOSPITAL | Age: 65
Discharge: HOME | End: 2020-09-24
Attending: PODIATRIST
Payer: COMMERCIAL

## 2020-09-24 ENCOUNTER — OFFICE VISIT (OUTPATIENT)
Dept: PODIATRY | Facility: CLINIC | Age: 65
End: 2020-09-24
Payer: COMMERCIAL

## 2020-09-24 VITALS
HEIGHT: 73 IN | BODY MASS INDEX: 32.2 KG/M2 | SYSTOLIC BLOOD PRESSURE: 91 MMHG | WEIGHT: 243 LBS | DIASTOLIC BLOOD PRESSURE: 62 MMHG | HEART RATE: 77 BPM

## 2020-09-24 DIAGNOSIS — M79.672 BILATERAL FOOT PAIN: ICD-10-CM

## 2020-09-24 DIAGNOSIS — M79.672 LEFT FOOT PAIN: ICD-10-CM

## 2020-09-24 DIAGNOSIS — G57.90 NEURITIS OF FOOT, UNSPECIFIED LATERALITY: Primary | ICD-10-CM

## 2020-09-24 DIAGNOSIS — M79.671 BILATERAL FOOT PAIN: ICD-10-CM

## 2020-09-24 DIAGNOSIS — R60.0 EDEMA OF RIGHT LOWER EXTREMITY: ICD-10-CM

## 2020-09-24 DIAGNOSIS — Z12.11 SPECIAL SCREENING FOR MALIGNANT NEOPLASM OF COLON: Primary | ICD-10-CM

## 2020-09-24 PROCEDURE — 73630 XR FOOT COMPLETE 3 VIEW BILATERAL: ICD-10-PCS | Mod: 26,,, | Performed by: RADIOLOGY

## 2020-09-24 PROCEDURE — 99244 PR OFFICE CONSULTATION,LEVEL IV: ICD-10-PCS | Mod: S$GLB,,, | Performed by: PODIATRIST

## 2020-09-24 PROCEDURE — 99999 PR PBB SHADOW E&M-EST. PATIENT-LVL IV: ICD-10-PCS | Mod: PBBFAC,,, | Performed by: PODIATRIST

## 2020-09-24 PROCEDURE — 99999 PR PBB SHADOW E&M-EST. PATIENT-LVL IV: CPT | Mod: PBBFAC,,, | Performed by: PODIATRIST

## 2020-09-24 PROCEDURE — 99244 OFF/OP CNSLTJ NEW/EST MOD 40: CPT | Mod: S$GLB,,, | Performed by: PODIATRIST

## 2020-09-24 PROCEDURE — 73630 X-RAY EXAM OF FOOT: CPT | Mod: 26,,, | Performed by: RADIOLOGY

## 2020-09-24 PROCEDURE — 73630 X-RAY EXAM OF FOOT: CPT | Mod: TC,50

## 2020-09-24 RX ORDER — GABAPENTIN 100 MG/1
100 CAPSULE ORAL 2 TIMES DAILY
Qty: 60 CAPSULE | Refills: 1 | Status: SHIPPED | OUTPATIENT
Start: 2020-09-24 | End: 2022-11-28

## 2020-09-24 NOTE — PROGRESS NOTES
Subjective:     Patient ID: Byron Styles is a 65 y.o. male.    Chief Complaint: Foot Pain (c/o bilateral mid-foot and lateral foot pain and swelling. rates pain 4/10. wears tennis shoes with socks. non-diabetic Pt. last seen on 09/10/20 with PCP Dr. Bernard.)    Byron is a 65 y.o. male who presents to the podiatry clinic  with complaint of bilateral foot pain and swelling. Onset of the symptoms was several weeks ago. Precipitating event: none known. Current symptoms include: ability to bear weight, but with some pain and swelling. Aggravating factors: walking. Symptoms have gradually worsened. Patient has had prior foot problems. Evaluation to date: plain films: left(normal), venour us left(normal). Treatment to date: rest. Patients rates pain 4/10 on pain scale. Patient states the same pain started in the left but has now  to the right. Patient has no other pedal complaints at this time.     Referring Provider: Dr. Ravindra Bernard    Patient Active Problem List   Diagnosis    Essential hypertension    Hyperlipidemia    Depression    Family history of ischemic heart disease (IHD)    Rosacea       Medication List with Changes/Refills   New Medications    FLU VAC 2020 65UP-EGIJO27J,PF, (FLUAD QUAD 2020-21,65Y UP,,PF,) 60 MCG (15 MCG X 4)/0.5 ML SYRG    Inject 0.5 mLs into the muscle once. for 1 dose    GABAPENTIN (NEURONTIN) 100 MG CAPSULE    Take 1 capsule (100 mg total) by mouth 2 (two) times daily.   Current Medications    ASPIRIN (ECOTRIN) 81 MG EC TABLET    Take 81 mg by mouth once daily.    ATORVASTATIN (LIPITOR) 40 MG TABLET    TAKE 1 TABLET BY MOUTH ONCE DAILY IN THE EVENING    FEBUXOSTAT (ULORIC) 40 MG TAB    Take 1 tablet (40 mg total) by mouth once daily.    HYDROCORTISONE 2.5 % CREAM        LISINOPRIL-HYDROCHLOROTHIAZIDE (PRINZIDE,ZESTORETIC) 20-12.5 MG PER TABLET    Take 1 tablet by mouth once daily    MULTIVITAMIN CAPSULE    Take 1 capsule by mouth once daily.    NADOLOL (CORGARD) 20 MG TABLET  "   TAKE 1 TABLET BY MOUTH ONCE DAILY    SERTRALINE (ZOLOFT) 100 MG TABLET    TAKE 1 TABLET BY MOUTH ONCE DAILY       Review of patient's allergies indicates:  No Known Allergies    Past Surgical History:   Procedure Laterality Date    COLONOSCOPY W/ POLYPECTOMY  2009    MOUTH SURGERY      WISDOM TOOTH EXTRACTION         Family History   Problem Relation Age of Onset    Heart disease Father 65        CABG    Cancer Father         Bladder    Heart disease Brother 68        CABG    Depression Mother     Heart disease Mother         CHF       Social History     Socioeconomic History    Marital status: Single     Spouse name: Not on file    Number of children: 0    Years of education: Not on file    Highest education level: Not on file   Occupational History    Occupation:      Employer: [employer]   Social Needs    Financial resource strain: Not on file    Food insecurity     Worry: Not on file     Inability: Not on file    Transportation needs     Medical: Not on file     Non-medical: Not on file   Tobacco Use    Smoking status: Never Smoker    Smokeless tobacco: Current User     Types: Snuff   Substance and Sexual Activity    Alcohol use: Yes     Comment: social/ occassional    Drug use: No    Sexual activity: Not Currently   Lifestyle    Physical activity     Days per week: Not on file     Minutes per session: Not on file    Stress: Not on file   Relationships    Social connections     Talks on phone: Not on file     Gets together: Not on file     Attends Zoroastrian service: Not on file     Active member of club or organization: Not on file     Attends meetings of clubs or organizations: Not on file     Relationship status: Not on file   Other Topics Concern    Not on file   Social History Narrative    Not on file       Vitals:    09/24/20 1332   BP: 91/62   Pulse: 77   Weight: 110.2 kg (243 lb)   Height: 6' 1" (1.854 m)   PainSc:   4   PainLoc: " Foot       Review of Systems   Constitutional: Negative for chills and fever.   Respiratory: Negative for shortness of breath.    Cardiovascular: Positive for leg swelling. Negative for chest pain, palpitations, orthopnea and claudication.   Gastrointestinal: Negative for diarrhea, nausea and vomiting.   Musculoskeletal: Negative for joint pain.   Skin: Negative for rash.   Neurological: Negative for dizziness, tingling, sensory change, focal weakness and weakness.   Psychiatric/Behavioral: Negative.              Objective:       PHYSICAL EXAM: Apperance: Alert and orient in no distress,well developed, and with good attention to grooming and body habits  Patient presents ambulating in tennis shoes.   Lower Extremity Physical Exam:  VASCULAR: Dorsalis pedis pulses 2/4 bilateral and Posterior Tibial pulses 2/4 bilateral. Capillary fill time <3 seconds bilateral. Mild edema observed right. Varicosities present bilateral. Skin temperature of the lower extremities is warm to warm, proximal to distal. Hair growth dim bilateral.  DERMATOLOGICAL: No skin rashes, subcutaneous nodules, lesions, or ulcers observed bilateral.  NEUROLOGICAL: Light touch, sharp-dull, proprioception all present and equal bilaterally. Vibratory sensation diminished. Protective sensation absent at all 10 at sites as tested with a [place]-Fede 5.07 monofilament.   MUSCULOSKELETAL: Muscle strength is 5/5 for foot inverters, everters, plantarflexors, and dorsiflexors. Muscle tone is normal. (+) pain on palpation of right lateral midfoot.          Assessment:       Encounter Diagnoses   Name Primary?    Neuritis of foot, unspecified laterality Yes    Left foot pain     Bilateral foot pain     Edema of right lower extremity - Right Foot          Plan:   Neuritis of foot, unspecified laterality  -     gabapentin (NEURONTIN) 100 MG capsule; Take 1 capsule (100 mg total) by mouth 2 (two) times daily.  Dispense: 60 capsule; Refill: 1    Left foot  pain  -     Ambulatory referral/consult to Podiatry    Bilateral foot pain  -     X-Ray Foot Complete Bilateral; Future; Expected date: 09/24/2020    Edema of right lower extremity - Right Foot      I counseled the patient on his conditions, regarding findings of my examination, my impressions, and usual treatment plan.   Ordered bilateral foot x-rays to be completed today.   The patient and I reviewed the types of shoes he should be wearing, my recommendation includes generally the best time of the day for a shoe fitting is the afternoon, shoes with a wide toe box, very good cushion, and tennis shoes with removable inner soles.The patient and I reviewed my recommendations for over-the-counter orthotic inserts.   Prescription written for Gabapentin 100mg to be taken once nightly. Informed patient of possible side effects including but not limited to disorientation and drowsiness. Patient instructed to discontinue use if there are any adverse effects. Patient states he understands.   Patient to return in 4-6 weeks.             Francisco Mayes DPM  [facility]

## 2020-09-24 NOTE — LETTER
September 24, 2020      Ravindra Bernard MD  [address]  [address]  [place] [address]           [facility]  [address] [place] [address]  Phone: [phone]  Fax: [phone]          Patient: Byron Styles   MR Number: [id number]   YOB: 1955   Date of Visit: 9/24/2020       Dear Dr. Ravindra Bernard:    Thank you for referring Byron Styles to me for evaluation. Attached you will find relevant portions of my assessment and plan of care.    If you have questions, please do not hesitate to call me. I look forward to following Byron Styles along with you.    Sincerely,    Francisco Mayes DPM    Enclosure  CC:  No Recipients    If you would like to receive this communication electronically, please contact [email or web address] or [phone] to request more information on [employer] Link access.    For providers and/or their staff who would like to refer a patient to [facility], please contact us through our one-stop-shop provider referral line, [facility], at [phone].    If you feel you have received this communication in error or would no longer like to receive these types of communications, please e-mail [email or web address]

## 2020-10-08 ENCOUNTER — OFFICE VISIT (OUTPATIENT)
Dept: ORTHOPEDICS | Facility: CLINIC | Age: 65
End: 2020-10-08
Payer: COMMERCIAL

## 2020-10-08 VITALS
BODY MASS INDEX: 32.2 KG/M2 | HEIGHT: 73 IN | HEART RATE: 84 BPM | WEIGHT: 242.94 LBS | SYSTOLIC BLOOD PRESSURE: 111 MMHG | DIASTOLIC BLOOD PRESSURE: 78 MMHG

## 2020-10-08 DIAGNOSIS — M17.0 PRIMARY OSTEOARTHRITIS OF BOTH KNEES: ICD-10-CM

## 2020-10-08 DIAGNOSIS — M21.162 ACQUIRED VARUS DEFORMITY KNEE, LEFT: ICD-10-CM

## 2020-10-08 DIAGNOSIS — M17.12 ARTHRITIS OF KNEE, LEFT: ICD-10-CM

## 2020-10-08 DIAGNOSIS — M21.161 ACQUIRED VARUS DEFORMITY KNEE, RIGHT: ICD-10-CM

## 2020-10-08 DIAGNOSIS — M17.11 ARTHRITIS OF KNEE, RIGHT: Primary | ICD-10-CM

## 2020-10-08 PROCEDURE — 99204 PR OFFICE/OUTPT VISIT, NEW, LEVL IV, 45-59 MIN: ICD-10-PCS | Mod: S$GLB,,, | Performed by: ORTHOPAEDIC SURGERY

## 2020-10-08 PROCEDURE — 99204 OFFICE O/P NEW MOD 45 MIN: CPT | Mod: S$GLB,,, | Performed by: ORTHOPAEDIC SURGERY

## 2020-10-08 PROCEDURE — 1101F PR PT FALLS ASSESS DOC 0-1 FALLS W/OUT INJ PAST YR: ICD-10-PCS | Mod: CPTII,S$GLB,, | Performed by: ORTHOPAEDIC SURGERY

## 2020-10-08 PROCEDURE — 99999 PR PBB SHADOW E&M-EST. PATIENT-LVL IV: CPT | Mod: PBBFAC,,, | Performed by: ORTHOPAEDIC SURGERY

## 2020-10-08 PROCEDURE — 3008F BODY MASS INDEX DOCD: CPT | Mod: CPTII,S$GLB,, | Performed by: ORTHOPAEDIC SURGERY

## 2020-10-08 PROCEDURE — 3074F PR MOST RECENT SYSTOLIC BLOOD PRESSURE < 130 MM HG: ICD-10-PCS | Mod: CPTII,S$GLB,, | Performed by: ORTHOPAEDIC SURGERY

## 2020-10-08 PROCEDURE — 99999 PR PBB SHADOW E&M-EST. PATIENT-LVL IV: ICD-10-PCS | Mod: PBBFAC,,, | Performed by: ORTHOPAEDIC SURGERY

## 2020-10-08 PROCEDURE — 1101F PT FALLS ASSESS-DOCD LE1/YR: CPT | Mod: CPTII,S$GLB,, | Performed by: ORTHOPAEDIC SURGERY

## 2020-10-08 PROCEDURE — 3078F DIAST BP <80 MM HG: CPT | Mod: CPTII,S$GLB,, | Performed by: ORTHOPAEDIC SURGERY

## 2020-10-08 PROCEDURE — 3074F SYST BP LT 130 MM HG: CPT | Mod: CPTII,S$GLB,, | Performed by: ORTHOPAEDIC SURGERY

## 2020-10-08 PROCEDURE — 3078F PR MOST RECENT DIASTOLIC BLOOD PRESSURE < 80 MM HG: ICD-10-PCS | Mod: CPTII,S$GLB,, | Performed by: ORTHOPAEDIC SURGERY

## 2020-10-08 PROCEDURE — 3008F PR BODY MASS INDEX (BMI) DOCUMENTED: ICD-10-PCS | Mod: CPTII,S$GLB,, | Performed by: ORTHOPAEDIC SURGERY

## 2020-10-08 RX ORDER — MELOXICAM 15 MG/1
15 TABLET ORAL DAILY
Qty: 30 TABLET | Refills: 3 | Status: SHIPPED | OUTPATIENT
Start: 2020-10-08 | End: 2021-09-14

## 2020-10-08 NOTE — PROGRESS NOTES
Subjective:     Patient ID: Byron Styles is a 65 y.o. male.    Chief Complaint: Pain of the Right Knee and Pain of the Left Knee   10/08/2020  HPI:  65-year-old male who states been having pain in both of his knees for almost 6 years.  Denies any history of trauma.  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  He occasionally takes Aleve may be 3 times a week for a foot problem that he just developed and seen Dr. Gr.  He does have history of gout .  Also had some numbness and neuropathy in his feet recently started by Podiatry on Neurontin 100 mg and that seems to ease things up for him.  Makes him a little bit sleepy and he did not want increase the dose.   pain in the knees are 4/10 worst with activities.  He does give history of Raynaud disease sometimes periphery gets severely cold.  Denies any treatment in the legs in the form of any injections or treatment for arthritis.  Did have an ultrasound to the legs which was negative for DVT recently and that because his feet swell up on him.  Denies any fever or chills or shortness of breath or difficulty with chewing swallowing loss of bowel or bladder control or blurry vision or double vision or chest pain    Past Medical History:   Diagnosis Date    Colon polyps 2009    Depression     Hyperlipidemia     Hypertension     Localized osteoarthrosis not specified whether primary or secondary, lower leg 4/6/2015    Raynaud's disease     Skin cancer of arm, left 05/2020    [facility]     Past Surgical History:   Procedure Laterality Date    COLONOSCOPY W/ POLYPECTOMY  2009    MOUTH SURGERY      WISDOM TOOTH EXTRACTION       Family History   Problem Relation Age of Onset    Heart disease Father 65        CABG    Cancer Father         Bladder    Heart disease Brother 68        CABG    Depression Mother     Heart disease Mother         CHF     Social History     Socioeconomic History     Marital status: Single     Spouse name: Not on file    Number of children: 0    Years of education: Not on file    Highest education level: Not on file   Occupational History    Occupation:      Employer: [employer]   Social Needs    Financial resource strain: Not on file    Food insecurity     Worry: Not on file     Inability: Not on file    Transportation needs     Medical: Not on file     Non-medical: Not on file   Tobacco Use    Smoking status: Never Smoker    Smokeless tobacco: Current User     Types: Snuff   Substance and Sexual Activity    Alcohol use: Yes     Comment: social/ occassional    Drug use: No    Sexual activity: Not Currently   Lifestyle    Physical activity     Days per week: Not on file     Minutes per session: Not on file    Stress: Not on file   Relationships    Social connections     Talks on phone: Not on file     Gets together: Not on file     Attends Buddhism service: Not on file     Active member of club or organization: Not on file     Attends meetings of clubs or organizations: Not on file     Relationship status: Not on file   Other Topics Concern    Not on file   Social History Narrative    Not on file     Medication List with Changes/Refills   New Medications    MELOXICAM (MOBIC) 15 MG TABLET    Take 1 tablet (15 mg total) by mouth once daily. Take with food   Current Medications    ASPIRIN (ECOTRIN) 81 MG EC TABLET    Take 81 mg by mouth once daily.    ATORVASTATIN (LIPITOR) 40 MG TABLET    TAKE 1 TABLET BY MOUTH ONCE DAILY IN THE EVENING    FEBUXOSTAT (ULORIC) 40 MG TAB    Take 1 tablet (40 mg total) by mouth once daily.    GABAPENTIN (NEURONTIN) 100 MG CAPSULE    Take 1 capsule (100 mg total) by mouth 2 (two) times daily.    HYDROCORTISONE 2.5 % CREAM        LISINOPRIL-HYDROCHLOROTHIAZIDE (PRINZIDE,ZESTORETIC) 20-12.5 MG PER TABLET    Take 1 tablet by mouth once daily    MULTIVITAMIN CAPSULE    Take 1 capsule by mouth  once daily.    NADOLOL (CORGARD) 20 MG TABLET    Take 1 tablet by mouth once daily    SERTRALINE (ZOLOFT) 100 MG TABLET    TAKE 1 TABLET BY MOUTH ONCE DAILY     Review of patient's allergies indicates:  No Known Allergies  Review of Systems   Constitution: Negative for decreased appetite.   HENT: Negative for tinnitus.    Eyes: Negative for double vision.   Cardiovascular: Negative for chest pain.   Respiratory: Negative for wheezing.    Hematologic/Lymphatic: Negative for bleeding problem.   Skin: Negative for dry skin.   Musculoskeletal: Positive for gout, joint pain and stiffness. Negative for arthritis, back pain and neck pain.   Gastrointestinal: Negative for abdominal pain.   Genitourinary: Negative for bladder incontinence.   Neurological: Negative for numbness, paresthesias and sensory change.   Psychiatric/Behavioral: Negative for altered mental status.       Objective:   Body mass index is 32.05 kg/m².  Vitals:    10/08/20 1308   BP: 111/78   Pulse: 84          General    Constitutional: He is oriented to person, place, and time. He appears well-developed.   HENT:   Head: Atraumatic.   Eyes: EOM are normal.   Cardiovascular: Normal rate.    Pulmonary/Chest: Effort normal.   Neurological: He is alert and oriented to person, place, and time.   Psychiatric: Judgment normal.            Cervical rotation is very functional  Ambulate with very mild limp  Bilateral upper extremity neurovascularly intact.  Very faint radial and ulnar pulses.  Greater than 3 sec capillary refill in the fingers due to Raynaud's phenomena.  Strength is 5/5 throughout motor groups  Lumbar without pain  Pelvis is level  Bilateral hips passive motion no pain no pain to palpation over the trochanters  Hip flexors, abductors, adductors, quads, hamstrings, ankle extensors and flexors inverters and everters all 5/5  Bilateral knee with varus deformity.  He does have pain lateral joint and crepitus to compression on the patella.  Range of  motion he has 5° of flexion contractures and flexes 120°.  Collaterals and cruciates are stable.  Mild swelling.  Questionable Lola sign  Calves are soft nontender  Minimal varicosities  Ankle motion intact  DP 1+  Skin is warm to touch no obvious lesions    Relevant imaging results reviewed and interpreted by me, discussed with the patient and / or family today     X-ray 09/10/2020 bilateral knees showing medial joint narrowing almost 80% with osteophytic changes and cystic degeneration consistent with bilateral knee arthrosis  Assessment:     Encounter Diagnoses   Name Primary?    Arthritis of knee, right Yes    Arthritis of knee, left     Acquired varus deformity knee, right     Acquired varus deformity knee, left     Primary osteoarthritis of both knees         Plan:   Arthritis of knee, right    Arthritis of knee, left    Acquired varus deformity knee, right    Acquired varus deformity knee, left    Primary osteoarthritis of both knees  Comments:  Continues to have some issues with arthritis in the knees.  Refer to ortho  Orders:  -     Ambulatory referral/consult to Orthopedics    Other orders  -     meloxicam (MOBIC) 15 MG tablet; Take 1 tablet (15 mg total) by mouth once daily. Take with food  Dispense: 30 tablet; Refill: 3         Patient Instructions   Start meloxicam 15 mg once a day with food  Cannot take Aleve, naproxen, Motrin, ibuprofen, Advil with meloxicam  Continue with the Neurontin/gabapentin 100 mg once at night, and aspirin   In the future you probably will be a candidate for steroid shots inside the knee as well as rooster comb gel/hyaluronic acid injections  In the future may need physical therapy  Return to see me in 4 weeks          Disclaimer: This note was prepared using a voice recognition system and is likely to have sound alike errors within the text.

## 2020-10-08 NOTE — PATIENT INSTRUCTIONS
Start meloxicam 15 mg once a day with food  Cannot take Aleve, naproxen, Motrin, ibuprofen, Advil with meloxicam  Continue with the Neurontin/gabapentin 100 mg once at night, and aspirin   In the future you probably will be a candidate for steroid shots inside the knee as well as rooster comb gel/hyaluronic acid injections  In the future may need physical therapy  Return to see me in 4 weeks

## 2020-10-08 NOTE — LETTER
October 8, 2020      Ravindra Bernard MD  [address]  [address]  [place] [address]           [facility]  [address] [place] [id number]  Phone: [phone]  Fax: [phone]          Patient: Byron Styles   MR Number: [id number]   YOB: 1955   Date of Visit: 10/8/2020       Dear Dr. Ravindra Bernard:    Thank you for referring Byron Styles to me for evaluation. Attached you will find relevant portions of my assessment and plan of care.    If you have questions, please do not hesitate to call me. I look forward to following Byron Styles along with you.    Sincerely,    Hansel Hutson MD    Enclosure  CC:  No Recipients    If you would like to receive this communication electronically, please contact [email or web address] or [phone] to request more information on [employer] Link access.    For providers and/or their staff who would like to refer a patient to [facility], please contact us through our one-stop-shop provider referral line, [facility], at [phone].    If you feel you have received this communication in error or would no longer like to receive these types of communications, please e-mail [email or web address]

## 2020-10-12 ENCOUNTER — TELEPHONE (OUTPATIENT)
Dept: ENDOSCOPY | Facility: HOSPITAL | Age: 65
End: 2020-10-12

## 2020-10-12 NOTE — TELEPHONE ENCOUNTER
Patient will call to schedule after finding a .      Location Screening:    If answers yes to any of the following, schedule at [facility] ONLY. If No, OK for either location.    1. Is there a diagnosis of heart failure, severe heart valve disease (aortic stenosis) or mechanical valve? no  a. Is the Left Ventricle Ejection Fraction <30% ? no    2. Does the pt have pulmonary hypertension? no   a. Is pulmonary arterial pressure gradient >50mmHg? no   b. Is there evidence of right ventricular dysfunction? no    3. Does the pt have achalasia? no    4. Any history of negative reaction to anesthesia? no   a. Myasthenia gravis? no   b. Malignant hyperthermia? no   c. Other? no    5. Is procedure for esophageal banding? no      COVID Screening    1. Have you had a fever in the last 7 days or have you used fever reducing medicines for a fever in the last 7 days?  no    2. Are you experiencing shortness of breath, cough, muscle aches, loss of taste or loss of smell?  no    If answered yes to questions 1 and 2, the patient must seek medical attention with their PCP.  Do not schedule their procedure.     3. Are you residing with anyone who has tested positive for Covid?  no    If answered yes to question 3, recommend 14 day self-quarantine from the date of relative's positive test and place special needs note in the depot.  Wait to schedule.     ENDO screening    1. Have you been admitted for cardiac, kidney or pulmonary causes to the hospital in the past 3 months? no   If yes, schedule an appointment with PCP before scheduling endoscopic procedure.     2. Have you had a stent placed in the last 12 months? no   If yes, for a screening visit, cancel and message the ordering provider.  The patient will need a new order when the time is appropriate.     3. Have you had a stroke or heart attack in the past 6 months? no   If yes, cancel and refer patient to ordering provider for clearance, also message ordering provider to inform.  "    4. Have you had any chest pain in the past 3 months? no   If yes, Have you been evaluated by your PCP and/or cardiologist and it was determined to not be heart related? not applicable   If No, Pt needs to be seen by PCP or Cardiologist .  Pt can be scheduled once clearance obtained by either of those providers.     5. Do you take prescription weight loss medications?  no   If yes, must stop for 2 weeks prior to procedure.     6. Have you been diagnosed with diverticulitis within the past 3 months? no   If yes, must have been seen by GI within the last 3 months, if not schedule with GI [facility].    If pt has been seen by GI, schedule procedure 8-12 weeks post antibiotic treatment.     7. Are you on Dialysis? no  If yes, schedule procedure for the day AFTER dialysis.  Appt time should be 9am or later, patient arrival time is 2 hours prior.  Nulytely or miralax prep for all patients with kidney disease.     8. Are you diabetic?  no   If yes, schedule morning appt. Advise pt to hold all diabetic meds day of procedure.     9. If pt is older than 80 years of age and HAS NOT been seen by GI or PCP within the last 6 months, needs appt with GI [facility].   If pt has been seen by the GI provider or PCP within the past 6  months AND meets criteria, schedule procedure AND send message to the endoscopist.     10. Is patient on a "high risk" medication (blood thinner/antiplatelet agent)?  no   If yes, has cardiac clearance been obtained within the last 60 days? N/A   If no, a new clearance needs to be obtained.     Final Questions:    1.I have reviewed the last colonoscopy for recommendations regarding next procedure bowel prep.  no  2. I have reviewed medications and allergies.  yes  3. I have verified the pharmacy information and appropriate prep sent if needed. yes  4. Prep instructions have been mailed or sent to portal per patient request. yes        All schedulers will have ability to reach out to the ordering GI provider to " clarify any issues.

## 2020-10-27 ENCOUNTER — OFFICE VISIT (OUTPATIENT)
Dept: PODIATRY | Facility: CLINIC | Age: 65
End: 2020-10-27
Payer: COMMERCIAL

## 2020-10-27 ENCOUNTER — PATIENT MESSAGE (OUTPATIENT)
Dept: PODIATRY | Facility: CLINIC | Age: 65
End: 2020-10-27

## 2020-10-27 VITALS
DIASTOLIC BLOOD PRESSURE: 85 MMHG | HEIGHT: 73 IN | OXYGEN SATURATION: 98 % | WEIGHT: 242.94 LBS | TEMPERATURE: 98 F | SYSTOLIC BLOOD PRESSURE: 122 MMHG | BODY MASS INDEX: 32.2 KG/M2 | HEART RATE: 92 BPM

## 2020-10-27 DIAGNOSIS — G57.90 NEURITIS OF FOOT, UNSPECIFIED LATERALITY: Primary | ICD-10-CM

## 2020-10-27 PROCEDURE — 3008F BODY MASS INDEX DOCD: CPT | Mod: CPTII,S$GLB,, | Performed by: PODIATRIST

## 2020-10-27 PROCEDURE — 3079F PR MOST RECENT DIASTOLIC BLOOD PRESSURE 80-89 MM HG: ICD-10-PCS | Mod: CPTII,S$GLB,, | Performed by: PODIATRIST

## 2020-10-27 PROCEDURE — 99212 OFFICE O/P EST SF 10 MIN: CPT | Mod: S$GLB,,, | Performed by: PODIATRIST

## 2020-10-27 PROCEDURE — 3074F PR MOST RECENT SYSTOLIC BLOOD PRESSURE < 130 MM HG: ICD-10-PCS | Mod: CPTII,S$GLB,, | Performed by: PODIATRIST

## 2020-10-27 PROCEDURE — 99999 PR PBB SHADOW E&M-EST. PATIENT-LVL IV: ICD-10-PCS | Mod: PBBFAC,,, | Performed by: PODIATRIST

## 2020-10-27 PROCEDURE — 1101F PT FALLS ASSESS-DOCD LE1/YR: CPT | Mod: CPTII,S$GLB,, | Performed by: PODIATRIST

## 2020-10-27 PROCEDURE — 3074F SYST BP LT 130 MM HG: CPT | Mod: CPTII,S$GLB,, | Performed by: PODIATRIST

## 2020-10-27 PROCEDURE — 99999 PR PBB SHADOW E&M-EST. PATIENT-LVL IV: CPT | Mod: PBBFAC,,, | Performed by: PODIATRIST

## 2020-10-27 PROCEDURE — 3008F PR BODY MASS INDEX (BMI) DOCUMENTED: ICD-10-PCS | Mod: CPTII,S$GLB,, | Performed by: PODIATRIST

## 2020-10-27 PROCEDURE — 1101F PR PT FALLS ASSESS DOC 0-1 FALLS W/OUT INJ PAST YR: ICD-10-PCS | Mod: CPTII,S$GLB,, | Performed by: PODIATRIST

## 2020-10-27 PROCEDURE — 99212 PR OFFICE/OUTPT VISIT, EST, LEVL II, 10-19 MIN: ICD-10-PCS | Mod: S$GLB,,, | Performed by: PODIATRIST

## 2020-10-27 PROCEDURE — 3079F DIAST BP 80-89 MM HG: CPT | Mod: CPTII,S$GLB,, | Performed by: PODIATRIST

## 2020-10-27 NOTE — PROGRESS NOTES
Subjective:     Patient ID: Byron Styles is a 65 y.o. male.    Chief Complaint: Follow-up    Byron is a 65 y.o. male who presents to the podiatry clinic for follow up bilateral foot pain. Patient states pain is better. Patient states he has an episode of swelling and pain in the left foot last week but states the medication as night helps. Patient states he only takes one Gabapentin at nighttime. Patient has no other pedal complaints at this time.     Patient Active Problem List   Diagnosis    Essential hypertension    Hyperlipidemia    Depression    Family history of ischemic heart disease (IHD)    Rosacea       Medication List with Changes/Refills   Current Medications    ASPIRIN (ECOTRIN) 81 MG EC TABLET    Take 81 mg by mouth once daily.    ATORVASTATIN (LIPITOR) 40 MG TABLET    TAKE 1 TABLET BY MOUTH ONCE DAILY IN THE EVENING    FEBUXOSTAT (ULORIC) 40 MG TAB    Take 1 tablet (40 mg total) by mouth once daily.    GABAPENTIN (NEURONTIN) 100 MG CAPSULE    Take 1 capsule (100 mg total) by mouth 2 (two) times daily.    HYDROCORTISONE 2.5 % CREAM        LISINOPRIL-HYDROCHLOROTHIAZIDE (PRINZIDE,ZESTORETIC) 20-12.5 MG PER TABLET    Take 1 tablet by mouth once daily    MELOXICAM (MOBIC) 15 MG TABLET    Take 1 tablet (15 mg total) by mouth once daily. Take with food    MULTIVITAMIN CAPSULE    Take 1 capsule by mouth once daily.    NADOLOL (CORGARD) 20 MG TABLET    Take 1 tablet by mouth once daily    SERTRALINE (ZOLOFT) 100 MG TABLET    TAKE 1 TABLET BY MOUTH ONCE DAILY       Review of patient's allergies indicates:  No Known Allergies    Past Surgical History:   Procedure Laterality Date    COLONOSCOPY W/ POLYPECTOMY  2009    MOUTH SURGERY      WISDOM TOOTH EXTRACTION         Family History   Problem Relation Age of Onset    Heart disease Father 65        CABG    Cancer Father         Bladder    Heart disease Brother 68        CABG    Depression Mother     Heart disease Mother         CHF       Social  "History     Socioeconomic History    Marital status: Single     Spouse name: Not on file    Number of children: 0    Years of education: Not on file    Highest education level: Not on file   Occupational History    Occupation:      Employer: [employer] [place]   Social Needs    Financial resource strain: Not on file    Food insecurity     Worry: Not on file     Inability: Not on file    Transportation needs     Medical: Not on file     Non-medical: Not on file   Tobacco Use    Smoking status: Never Smoker    Smokeless tobacco: Current User     Types: Snuff   Substance and Sexual Activity    Alcohol use: Yes     Comment: social/ occassional    Drug use: No    Sexual activity: Not Currently   Lifestyle    Physical activity     Days per week: Not on file     Minutes per session: Not on file    Stress: Not on file   Relationships    Social connections     Talks on phone: Not on file     Gets together: Not on file     Attends Caodaism service: Not on file     Active member of club or organization: Not on file     Attends meetings of clubs or organizations: Not on file     Relationship status: Not on file   Other Topics Concern    Not on file   Social History Narrative    Not on file       Vitals:    10/27/20 1406   BP: 122/85   Pulse: 92   Temp: 98.1 °F (36.7 °C)   SpO2: 98%   Weight: 110.2 kg (242 lb 15.2 oz)   Height: 6' 1" (1.854 m)   PainSc: 0-No pain         Review of Systems   Constitutional: Negative for chills and fever.   Respiratory: Negative for shortness of breath.    Cardiovascular: Negative for chest pain, palpitations, orthopnea, claudication and leg swelling.   Gastrointestinal: Negative for diarrhea, nausea and vomiting.   Musculoskeletal: Negative for joint pain.   Skin: Negative for rash.   Neurological: Negative for dizziness, tingling, sensory change, focal weakness and weakness.   Psychiatric/Behavioral: Negative.          Objective:    "   PHYSICAL EXAM: Apperance: Alert and orient in no distress,well developed, and with good attention to grooming and body habits  Patient presents ambulating in tennis shoes.   Lower Extremity Physical Exam:  VASCULAR: Dorsalis pedis pulses 2/4 bilateral and Posterior Tibial pulses 2/4 bilateral. Capillary fill time <3 seconds bilateral. No edema observed bilateral. Varicosities present bilateral. Skin temperature of the lower extremities is warm to warm, proximal to distal. Hair growth dim bilateral.  DERMATOLOGICAL: No skin rashes, subcutaneous nodules, lesions, or ulcers observed bilateral.  NEUROLOGICAL: Light touch, sharp-dull, proprioception all present and equal bilaterally. Vibratory sensation diminished. Protective sensation absent at all 10 at sites as tested with a [place] 5.07 monofilament.   MUSCULOSKELETAL: Muscle strength is 5/5 for foot inverters, everters, plantarflexors, and dorsiflexors. Muscle tone is normal. (-) pain on palpation of right lateral midfoot.    TEST RESULTS: Radiographs of bilateral foot/ankle taken reveals no fracture or dislocation.  No erosions.  Mild multi articular degenerative changes.  Soft tissues appear normal.          Assessment:       Encounter Diagnosis   Name Primary?    Neuritis of foot, unspecified laterality Yes         Plan:   Neuritis of foot, unspecified laterality      I counseled the patient on his conditions, regarding findings of my examination, my impressions, and usual treatment plan.   Reviewed previous bilateral foot x-rays in exam room with patient.   Patient instructed to continue Gabapentin nightly until completed.   The patient and I reviewed the types of shoes he should be wearing, my recommendation includes generally the best time of the day for a shoe fitting is the afternoon, shoes with a wide toe box, very good cushion, and tennis shoes with removable inner soles.The patient and I reviewed my recommendations for over-the-counter  orthotic inserts.   Patient to return as needed.             Francisco Mayes DPM  [facility]

## 2020-11-18 ENCOUNTER — PATIENT OUTREACH (OUTPATIENT)
Dept: ADMINISTRATIVE | Facility: OTHER | Age: 65
End: 2020-11-18

## 2020-11-18 NOTE — PROGRESS NOTES
Health Maintenance Due   Topic Date Due    HIV Screening  06/08/1970    Shingles Vaccine (2 of 3) 09/05/2017    Colorectal Cancer Screening  11/05/2019    Pneumococcal Vaccine (65+ Low/Medium Risk) (1 of 2 - PCV13) 06/08/2020    Abdominal Aortic Aneurysm Screening  06/08/2020     Updates were requested from care everywhere.  Chart was reviewed for overdue Proactive [facility] Encounters (GUILLERMO) topics (CRS, Breast Cancer Screening, Eye exam)  Health Maintenance has been updated.  LINKS immunization registry triggered.  LINKS not responding.

## 2020-11-19 ENCOUNTER — OFFICE VISIT (OUTPATIENT)
Dept: ORTHOPEDICS | Facility: CLINIC | Age: 65
End: 2020-11-19
Payer: COMMERCIAL

## 2020-11-19 VITALS
DIASTOLIC BLOOD PRESSURE: 74 MMHG | WEIGHT: 242 LBS | HEART RATE: 73 BPM | BODY MASS INDEX: 32.07 KG/M2 | SYSTOLIC BLOOD PRESSURE: 116 MMHG | HEIGHT: 73 IN

## 2020-11-19 DIAGNOSIS — M17.12 ARTHRITIS OF KNEE, LEFT: ICD-10-CM

## 2020-11-19 DIAGNOSIS — M21.161 ACQUIRED VARUS DEFORMITY KNEE, RIGHT: ICD-10-CM

## 2020-11-19 DIAGNOSIS — M17.11 ARTHRITIS OF KNEE, RIGHT: Primary | ICD-10-CM

## 2020-11-19 DIAGNOSIS — M21.162 ACQUIRED VARUS DEFORMITY KNEE, LEFT: ICD-10-CM

## 2020-11-19 PROCEDURE — 3008F PR BODY MASS INDEX (BMI) DOCUMENTED: ICD-10-PCS | Mod: CPTII,S$GLB,, | Performed by: ORTHOPAEDIC SURGERY

## 2020-11-19 PROCEDURE — 3074F PR MOST RECENT SYSTOLIC BLOOD PRESSURE < 130 MM HG: ICD-10-PCS | Mod: CPTII,S$GLB,, | Performed by: ORTHOPAEDIC SURGERY

## 2020-11-19 PROCEDURE — 99213 OFFICE O/P EST LOW 20 MIN: CPT | Mod: S$GLB,,, | Performed by: ORTHOPAEDIC SURGERY

## 2020-11-19 PROCEDURE — 3008F BODY MASS INDEX DOCD: CPT | Mod: CPTII,S$GLB,, | Performed by: ORTHOPAEDIC SURGERY

## 2020-11-19 PROCEDURE — 3078F PR MOST RECENT DIASTOLIC BLOOD PRESSURE < 80 MM HG: ICD-10-PCS | Mod: CPTII,S$GLB,, | Performed by: ORTHOPAEDIC SURGERY

## 2020-11-19 PROCEDURE — 1126F PR PAIN SEVERITY QUANTIFIED, NO PAIN PRESENT: ICD-10-PCS | Mod: S$GLB,,, | Performed by: ORTHOPAEDIC SURGERY

## 2020-11-19 PROCEDURE — 99999 PR PBB SHADOW E&M-EST. PATIENT-LVL IV: ICD-10-PCS | Mod: PBBFAC,,, | Performed by: ORTHOPAEDIC SURGERY

## 2020-11-19 PROCEDURE — 3074F SYST BP LT 130 MM HG: CPT | Mod: CPTII,S$GLB,, | Performed by: ORTHOPAEDIC SURGERY

## 2020-11-19 PROCEDURE — 99213 PR OFFICE/OUTPT VISIT, EST, LEVL III, 20-29 MIN: ICD-10-PCS | Mod: S$GLB,,, | Performed by: ORTHOPAEDIC SURGERY

## 2020-11-19 PROCEDURE — 99999 PR PBB SHADOW E&M-EST. PATIENT-LVL IV: CPT | Mod: PBBFAC,,, | Performed by: ORTHOPAEDIC SURGERY

## 2020-11-19 PROCEDURE — 1126F AMNT PAIN NOTED NONE PRSNT: CPT | Mod: S$GLB,,, | Performed by: ORTHOPAEDIC SURGERY

## 2020-11-19 PROCEDURE — 3078F DIAST BP <80 MM HG: CPT | Mod: CPTII,S$GLB,, | Performed by: ORTHOPAEDIC SURGERY

## 2020-11-19 NOTE — PROGRESS NOTES
Subjective:     Patient ID: Byron Styles is a 65 y.o. male.    Chief Complaint: Pain of the Right Knee and Pain of the Left Knee   10/08/2020  HPI:  65-year-old male who states been having pain in both of his knees for almost 6 years.  Denies any history of trauma.  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  He occasionally takes Aleve may be 3 times a week for a foot problem that he just developed and seen Dr. Gr.  He does have history of gout .  Also had some numbness and neuropathy in his feet recently started by Podiatry on Neurontin 100 mg and that seems to ease things up for him.  Makes him a little bit sleepy and he did not want increase the dose.   pain in the knees are 4/10 worst with activities.  He does give history of Raynaud disease sometimes periphery gets severely cold.  Denies any treatment in the legs in the form of any injections or treatment for arthritis.  Did have an ultrasound to the legs which was negative for DVT recently and that because his feet swell up on him.  Denies any fever or chills or shortness of breath or difficulty with chewing swallowing loss of bowel or bladder control or blurry vision or double vision or chest pain    11/19/2020  Patient stated the meloxicam seems to help helped significantly and not having any pain in his knees anymore.  He is doing really well at this point in time.  He will be finishing taking his gabapentin for his primary care by the end of this prescription since his feet are doing better.  Overall he is doing well.  He is still remaining active at work.  Denies any fever or chills or shortness of breath or difficulty with chewing or swallowing loss of bowel bladder control or blurry vision double vision or loss of sense smell taste  His pain level is 0/10 today and states the meloxicam worked great.  Blood pressure 116/74, pulse 73    Past Medical History:   Diagnosis Date    Colon  polyps 2009    Depression     Hyperlipidemia     Hypertension     Localized osteoarthrosis not specified whether primary or secondary, lower leg 4/6/2015    Raynaud's disease     Skin cancer of arm, left 05/2020    [facility]     Past Surgical History:   Procedure Laterality Date    COLONOSCOPY W/ POLYPECTOMY  2009    MOUTH SURGERY      WISDOM TOOTH EXTRACTION       Family History   Problem Relation Age of Onset    Heart disease Father 65        CABG    Cancer Father         Bladder    Heart disease Brother 68        CABG    Depression Mother     Heart disease Mother         CHF     Social History     Socioeconomic History    Marital status: Single     Spouse name: Not on file    Number of children: 0    Years of education: Not on file    Highest education level: Not on file   Occupational History    Occupation:      Employer: [employer]   Social Needs    Financial resource strain: Not on file    Food insecurity     Worry: Not on file     Inability: Not on file    Transportation needs     Medical: Not on file     Non-medical: Not on file   Tobacco Use    Smoking status: Never Smoker    Smokeless tobacco: Current User     Types: Snuff   Substance and Sexual Activity    Alcohol use: Yes     Comment: social/ occassional    Drug use: No    Sexual activity: Not Currently   Lifestyle    Physical activity     Days per week: Not on file     Minutes per session: Not on file    Stress: Not on file   Relationships    Social connections     Talks on phone: Not on file     Gets together: Not on file     Attends Anglican service: Not on file     Active member of club or organization: Not on file     Attends meetings of clubs or organizations: Not on file     Relationship status: Not on file   Other Topics Concern    Not on file   Social History Narrative    Not on file     Medication List with Changes/Refills   Current Medications    ASPIRIN  (ECOTRIN) 81 MG EC TABLET    Take 81 mg by mouth once daily.    ATORVASTATIN (LIPITOR) 40 MG TABLET    TAKE 1 TABLET BY MOUTH ONCE DAILY IN THE EVENING    FEBUXOSTAT (ULORIC) 40 MG TAB    Take 1 tablet (40 mg total) by mouth once daily.    GABAPENTIN (NEURONTIN) 100 MG CAPSULE    Take 1 capsule (100 mg total) by mouth 2 (two) times daily.    HYDROCORTISONE 2.5 % CREAM        LISINOPRIL-HYDROCHLOROTHIAZIDE (PRINZIDE,ZESTORETIC) 20-12.5 MG PER TABLET    Take 1 tablet by mouth once daily    MELOXICAM (MOBIC) 15 MG TABLET    Take 1 tablet (15 mg total) by mouth once daily. Take with food    MULTIVITAMIN CAPSULE    Take 1 capsule by mouth once daily.    NADOLOL (CORGARD) 20 MG TABLET    Take 1 tablet by mouth once daily    SERTRALINE (ZOLOFT) 100 MG TABLET    TAKE 1 TABLET BY MOUTH ONCE DAILY     Review of patient's allergies indicates:  No Known Allergies  Review of Systems   Constitution: Negative for decreased appetite.   HENT: Negative for tinnitus.    Eyes: Negative for double vision.   Cardiovascular: Negative for chest pain.   Respiratory: Negative for wheezing.    Hematologic/Lymphatic: Negative for bleeding problem.   Skin: Negative for dry skin.   Musculoskeletal: Positive for gout, joint pain and stiffness. Negative for arthritis, back pain and neck pain.   Gastrointestinal: Negative for abdominal pain.   Genitourinary: Negative for bladder incontinence.   Neurological: Negative for numbness, paresthesias and sensory change.   Psychiatric/Behavioral: Negative for altered mental status.       Objective:   Body mass index is 31.93 kg/m².  Vitals:    11/19/20 1558   BP: 116/74   Pulse: 73          General    Constitutional: He is oriented to person, place, and time. He appears well-developed.   HENT:   Head: Atraumatic.   Eyes: EOM are normal.   Cardiovascular: Normal rate.    Pulmonary/Chest: Effort normal.   Neurological: He is alert and oriented to person, place, and time.   Psychiatric: Judgment normal.             Cervical rotation is very functional  Ambulate with very mild limp  Bilateral upper extremity neurovascularly intact.  Very faint radial and ulnar pulses.  Greater than 3 sec capillary refill in the fingers due to Raynaud's phenomena.  Strength is 5/5 throughout motor groups  Lumbar without pain  Pelvis is level  Bilateral hips passive motion no pain no pain to palpation over the trochanters  Hip flexors, abductors, adductors, quads, hamstrings, ankle extensors and flexors inverters and everters all 5/5  Bilateral knee with varus deformity.  He does have mild tenderness medial  joint and crepitus to compression on the patella.  Range of motion he has 5° of flexion contractures and flexes 120°.  Collaterals and cruciates are stable.  Mild swelling.  Questionable Lola sign  Calves are soft nontender  Minimal varicosities  Ankle motion intact  DP 1+  Skin is warm to touch no obvious lesions    Relevant imaging results reviewed and interpreted by me, discussed with the patient and / or family today     X-ray 09/10/2020 bilateral knees showing medial joint narrowing almost 80% with osteophytic changes and cystic degeneration consistent with bilateral knee arthrosis  Assessment:     Encounter Diagnoses   Name Primary?    Arthritis of knee, right Yes    Arthritis of knee, left     Acquired varus deformity knee, right     Acquired varus deformity knee, left         Plan:   Arthritis of knee, right    Arthritis of knee, left    Acquired varus deformity knee, right    Acquired varus deformity knee, left         Patient Instructions   We will start taking meloxicam 15 mg on as-needed basis with food  In the future if it stops working than we can change the medication to something else  Also we can give you injections of steroid and rooster comb shots  Return in 3 months  May reschedule if doing fine          Disclaimer: This note was prepared using a voice recognition system and is likely to have sound alike  errors within the text.

## 2020-11-19 NOTE — PATIENT INSTRUCTIONS
We will start taking meloxicam 15 mg on as-needed basis with food  In the future if it stops working than we can change the medication to something else  Also we can give you injections of steroid and rooster comb shots  Return in 3 months  May reschedule if doing fine

## 2020-11-21 DIAGNOSIS — Z12.11 SPECIAL SCREENING FOR MALIGNANT NEOPLASMS, COLON: Primary | ICD-10-CM

## 2021-02-17 ENCOUNTER — PATIENT MESSAGE (OUTPATIENT)
Dept: INTERNAL MEDICINE | Facility: CLINIC | Age: 66
End: 2021-02-17

## 2021-02-19 ENCOUNTER — PATIENT OUTREACH (OUTPATIENT)
Dept: ADMINISTRATIVE | Facility: OTHER | Age: 66
End: 2021-02-19

## 2021-03-04 ENCOUNTER — TELEPHONE (OUTPATIENT)
Dept: ENDOSCOPY | Facility: HOSPITAL | Age: 66
End: 2021-03-04

## 2021-03-19 ENCOUNTER — TELEPHONE (OUTPATIENT)
Dept: ENDOSCOPY | Facility: HOSPITAL | Age: 66
End: 2021-03-19

## 2021-04-20 ENCOUNTER — PATIENT MESSAGE (OUTPATIENT)
Dept: ENDOSCOPY | Facility: HOSPITAL | Age: 66
End: 2021-04-20

## 2021-04-28 ENCOUNTER — TELEPHONE (OUTPATIENT)
Dept: ENDOSCOPY | Facility: HOSPITAL | Age: 66
End: 2021-04-28

## 2021-08-12 ENCOUNTER — OFFICE VISIT (OUTPATIENT)
Dept: INTERNAL MEDICINE | Facility: CLINIC | Age: 66
End: 2021-08-12
Payer: COMMERCIAL

## 2021-08-12 VITALS
DIASTOLIC BLOOD PRESSURE: 88 MMHG | BODY MASS INDEX: 31.65 KG/M2 | RESPIRATION RATE: 20 BRPM | SYSTOLIC BLOOD PRESSURE: 132 MMHG | HEART RATE: 77 BPM | WEIGHT: 239.88 LBS | TEMPERATURE: 98 F | OXYGEN SATURATION: 97 %

## 2021-08-12 DIAGNOSIS — Z13.6 ENCOUNTER FOR ABDOMINAL AORTIC ANEURYSM (AAA) SCREENING: ICD-10-CM

## 2021-08-12 DIAGNOSIS — Z00.00 ROUTINE GENERAL MEDICAL EXAMINATION AT HEALTH CARE FACILITY: Primary | ICD-10-CM

## 2021-08-12 DIAGNOSIS — Z12.11 COLON CANCER SCREENING: ICD-10-CM

## 2021-08-12 PROCEDURE — 99397 PER PM REEVAL EST PAT 65+ YR: CPT | Mod: 25,S$GLB,, | Performed by: PHYSICIAN ASSISTANT

## 2021-08-12 PROCEDURE — 3079F PR MOST RECENT DIASTOLIC BLOOD PRESSURE 80-89 MM HG: ICD-10-PCS | Mod: CPTII,S$GLB,, | Performed by: PHYSICIAN ASSISTANT

## 2021-08-12 PROCEDURE — 3079F DIAST BP 80-89 MM HG: CPT | Mod: CPTII,S$GLB,, | Performed by: PHYSICIAN ASSISTANT

## 2021-08-12 PROCEDURE — 3008F PR BODY MASS INDEX (BMI) DOCUMENTED: ICD-10-PCS | Mod: CPTII,S$GLB,, | Performed by: PHYSICIAN ASSISTANT

## 2021-08-12 PROCEDURE — 1159F MED LIST DOCD IN RCRD: CPT | Mod: CPTII,S$GLB,, | Performed by: PHYSICIAN ASSISTANT

## 2021-08-12 PROCEDURE — 1159F PR MEDICATION LIST DOCUMENTED IN MEDICAL RECORD: ICD-10-PCS | Mod: CPTII,S$GLB,, | Performed by: PHYSICIAN ASSISTANT

## 2021-08-12 PROCEDURE — 1101F PT FALLS ASSESS-DOCD LE1/YR: CPT | Mod: CPTII,S$GLB,, | Performed by: PHYSICIAN ASSISTANT

## 2021-08-12 PROCEDURE — 90471 IMMUNIZATION ADMIN: CPT | Mod: S$GLB,,, | Performed by: PHYSICIAN ASSISTANT

## 2021-08-12 PROCEDURE — 3008F BODY MASS INDEX DOCD: CPT | Mod: CPTII,S$GLB,, | Performed by: PHYSICIAN ASSISTANT

## 2021-08-12 PROCEDURE — 99397 PR PREVENTIVE VISIT,EST,65 & OVER: ICD-10-PCS | Mod: 25,S$GLB,, | Performed by: PHYSICIAN ASSISTANT

## 2021-08-12 PROCEDURE — 1101F PR PT FALLS ASSESS DOC 0-1 FALLS W/OUT INJ PAST YR: ICD-10-PCS | Mod: CPTII,S$GLB,, | Performed by: PHYSICIAN ASSISTANT

## 2021-08-12 PROCEDURE — 90732 PNEUMOCOCCAL POLYSACCHARIDE VACCINE 23-VALENT =>2YO SQ IM: ICD-10-PCS | Mod: S$GLB,,, | Performed by: PHYSICIAN ASSISTANT

## 2021-08-12 PROCEDURE — 90732 PPSV23 VACC 2 YRS+ SUBQ/IM: CPT | Mod: S$GLB,,, | Performed by: PHYSICIAN ASSISTANT

## 2021-08-12 PROCEDURE — 1126F PR PAIN SEVERITY QUANTIFIED, NO PAIN PRESENT: ICD-10-PCS | Mod: CPTII,S$GLB,, | Performed by: PHYSICIAN ASSISTANT

## 2021-08-12 PROCEDURE — 3075F PR MOST RECENT SYSTOLIC BLOOD PRESS GE 130-139MM HG: ICD-10-PCS | Mod: CPTII,S$GLB,, | Performed by: PHYSICIAN ASSISTANT

## 2021-08-12 PROCEDURE — 99999 PR PBB SHADOW E&M-EST. PATIENT-LVL V: CPT | Mod: PBBFAC,,, | Performed by: PHYSICIAN ASSISTANT

## 2021-08-12 PROCEDURE — 3288F PR FALLS RISK ASSESSMENT DOCUMENTED: ICD-10-PCS | Mod: CPTII,S$GLB,, | Performed by: PHYSICIAN ASSISTANT

## 2021-08-12 PROCEDURE — 3288F FALL RISK ASSESSMENT DOCD: CPT | Mod: CPTII,S$GLB,, | Performed by: PHYSICIAN ASSISTANT

## 2021-08-12 PROCEDURE — 3075F SYST BP GE 130 - 139MM HG: CPT | Mod: CPTII,S$GLB,, | Performed by: PHYSICIAN ASSISTANT

## 2021-08-12 PROCEDURE — 90471 PNEUMOCOCCAL POLYSACCHARIDE VACCINE 23-VALENT =>2YO SQ IM: ICD-10-PCS | Mod: S$GLB,,, | Performed by: PHYSICIAN ASSISTANT

## 2021-08-12 PROCEDURE — 99999 PR PBB SHADOW E&M-EST. PATIENT-LVL V: ICD-10-PCS | Mod: PBBFAC,,, | Performed by: PHYSICIAN ASSISTANT

## 2021-08-12 PROCEDURE — 1126F AMNT PAIN NOTED NONE PRSNT: CPT | Mod: CPTII,S$GLB,, | Performed by: PHYSICIAN ASSISTANT

## 2021-08-17 ENCOUNTER — TELEPHONE (OUTPATIENT)
Dept: INTERNAL MEDICINE | Facility: CLINIC | Age: 66
End: 2021-08-17

## 2021-08-26 ENCOUNTER — LAB VISIT (OUTPATIENT)
Dept: LAB | Facility: HOSPITAL | Age: 66
End: 2021-08-26
Attending: PHYSICIAN ASSISTANT
Payer: COMMERCIAL

## 2021-08-26 DIAGNOSIS — Z00.00 ROUTINE GENERAL MEDICAL EXAMINATION AT HEALTH CARE FACILITY: ICD-10-CM

## 2021-08-26 LAB
BASOPHILS # BLD AUTO: 0.06 K/UL (ref 0–0.2)
BASOPHILS NFR BLD: 0.9 % (ref 0–1.9)
DIFFERENTIAL METHOD: NORMAL
EOSINOPHIL # BLD AUTO: 0.2 K/UL (ref 0–0.5)
EOSINOPHIL NFR BLD: 2.7 % (ref 0–8)
ERYTHROCYTE [DISTWIDTH] IN BLOOD BY AUTOMATED COUNT: 12.8 % (ref 11.5–14.5)
HCT VFR BLD AUTO: 44.3 % (ref 40–54)
HGB BLD-MCNC: 14.6 G/DL (ref 14–18)
IMM GRANULOCYTES # BLD AUTO: 0.03 K/UL (ref 0–0.04)
IMM GRANULOCYTES NFR BLD AUTO: 0.5 % (ref 0–0.5)
LYMPHOCYTES # BLD AUTO: 1.5 K/UL (ref 1–4.8)
LYMPHOCYTES NFR BLD: 24.1 % (ref 18–48)
MCH RBC QN AUTO: 29.4 PG (ref 27–31)
MCHC RBC AUTO-ENTMCNC: 33 G/DL (ref 32–36)
MCV RBC AUTO: 89 FL (ref 82–98)
MONOCYTES # BLD AUTO: 0.6 K/UL (ref 0.3–1)
MONOCYTES NFR BLD: 9.1 % (ref 4–15)
NEUTROPHILS # BLD AUTO: 4 K/UL (ref 1.8–7.7)
NEUTROPHILS NFR BLD: 62.7 % (ref 38–73)
NRBC BLD-RTO: 0 /100 WBC
PLATELET # BLD AUTO: 342 K/UL (ref 150–450)
PMV BLD AUTO: 9.5 FL (ref 9.2–12.9)
RBC # BLD AUTO: 4.97 M/UL (ref 4.6–6.2)
WBC # BLD AUTO: 6.4 K/UL (ref 3.9–12.7)

## 2021-08-26 PROCEDURE — 84153 ASSAY OF PSA TOTAL: CPT | Performed by: PHYSICIAN ASSISTANT

## 2021-08-26 PROCEDURE — 84550 ASSAY OF BLOOD/URIC ACID: CPT | Performed by: PHYSICIAN ASSISTANT

## 2021-08-26 PROCEDURE — 80053 COMPREHEN METABOLIC PANEL: CPT | Performed by: PHYSICIAN ASSISTANT

## 2021-08-26 PROCEDURE — 80061 LIPID PANEL: CPT | Performed by: PHYSICIAN ASSISTANT

## 2021-08-26 PROCEDURE — 85025 COMPLETE CBC W/AUTO DIFF WBC: CPT | Performed by: PHYSICIAN ASSISTANT

## 2021-08-26 PROCEDURE — 36415 COLL VENOUS BLD VENIPUNCTURE: CPT | Mod: PO | Performed by: PHYSICIAN ASSISTANT

## 2021-08-27 LAB
ALBUMIN SERPL BCP-MCNC: 3.5 G/DL (ref 3.5–5.2)
ALP SERPL-CCNC: 79 U/L (ref 55–135)
ALT SERPL W/O P-5'-P-CCNC: 26 U/L (ref 10–44)
ANION GAP SERPL CALC-SCNC: 12 MMOL/L (ref 8–16)
AST SERPL-CCNC: 17 U/L (ref 10–40)
BILIRUB SERPL-MCNC: 0.5 MG/DL (ref 0.1–1)
BUN SERPL-MCNC: 15 MG/DL (ref 8–23)
CALCIUM SERPL-MCNC: 9.6 MG/DL (ref 8.7–10.5)
CHLORIDE SERPL-SCNC: 102 MMOL/L (ref 95–110)
CHOLEST SERPL-MCNC: 150 MG/DL (ref 120–199)
CHOLEST/HDLC SERPL: 3.9 {RATIO} (ref 2–5)
CO2 SERPL-SCNC: 25 MMOL/L (ref 23–29)
COMPLEXED PSA SERPL-MCNC: 0.62 NG/ML (ref 0–4)
CREAT SERPL-MCNC: 1 MG/DL (ref 0.5–1.4)
EST. GFR  (AFRICAN AMERICAN): >60 ML/MIN/1.73 M^2
EST. GFR  (NON AFRICAN AMERICAN): >60 ML/MIN/1.73 M^2
GLUCOSE SERPL-MCNC: 140 MG/DL (ref 70–110)
HDLC SERPL-MCNC: 38 MG/DL (ref 40–75)
HDLC SERPL: 25.3 % (ref 20–50)
LDLC SERPL CALC-MCNC: 77.8 MG/DL (ref 63–159)
NONHDLC SERPL-MCNC: 112 MG/DL
POTASSIUM SERPL-SCNC: 3.2 MMOL/L (ref 3.5–5.1)
PROT SERPL-MCNC: 7 G/DL (ref 6–8.4)
SODIUM SERPL-SCNC: 139 MMOL/L (ref 136–145)
TRIGL SERPL-MCNC: 171 MG/DL (ref 30–150)
URATE SERPL-MCNC: 5.4 MG/DL (ref 3.4–7)

## 2021-08-31 ENCOUNTER — PATIENT MESSAGE (OUTPATIENT)
Dept: INTERNAL MEDICINE | Facility: CLINIC | Age: 66
End: 2021-08-31

## 2021-08-31 DIAGNOSIS — R73.9 HYPERGLYCEMIA: Primary | ICD-10-CM

## 2021-09-13 ENCOUNTER — PATIENT MESSAGE (OUTPATIENT)
Dept: INTERNAL MEDICINE | Facility: CLINIC | Age: 66
End: 2021-09-13

## 2021-09-23 ENCOUNTER — IMMUNIZATION (OUTPATIENT)
Dept: PHARMACY | Facility: CLINIC | Age: 66
End: 2021-09-23
Payer: COMMERCIAL

## 2021-09-23 ENCOUNTER — HOSPITAL ENCOUNTER (OUTPATIENT)
Dept: RADIOLOGY | Facility: HOSPITAL | Age: 66
Discharge: HOME | End: 2021-09-23
Attending: INTERNAL MEDICINE
Payer: COMMERCIAL

## 2021-09-23 ENCOUNTER — LAB VISIT (OUTPATIENT)
Dept: LAB | Facility: HOSPITAL | Age: 66
End: 2021-09-23
Attending: INTERNAL MEDICINE
Payer: COMMERCIAL

## 2021-09-23 DIAGNOSIS — R73.9 HYPERGLYCEMIA: ICD-10-CM

## 2021-09-23 DIAGNOSIS — Z13.6 ENCOUNTER FOR ABDOMINAL AORTIC ANEURYSM (AAA) SCREENING: ICD-10-CM

## 2021-09-23 LAB
ESTIMATED AVG GLUCOSE: 105 MG/DL (ref 68–131)
GLUCOSE SERPL-MCNC: 102 MG/DL (ref 70–110)
HBA1C MFR BLD: 5.3 % (ref 4–5.6)

## 2021-09-23 PROCEDURE — 36415 COLL VENOUS BLD VENIPUNCTURE: CPT | Performed by: INTERNAL MEDICINE

## 2021-09-23 PROCEDURE — 82947 ASSAY GLUCOSE BLOOD QUANT: CPT | Performed by: INTERNAL MEDICINE

## 2021-09-23 PROCEDURE — 83036 HEMOGLOBIN GLYCOSYLATED A1C: CPT | Performed by: INTERNAL MEDICINE

## 2021-09-23 PROCEDURE — 76775 US ABDOMINAL AORTA: ICD-10-PCS | Mod: 26,,, | Performed by: RADIOLOGY

## 2021-09-23 PROCEDURE — 76775 US EXAM ABDO BACK WALL LIM: CPT | Mod: 26,,, | Performed by: RADIOLOGY

## 2021-09-23 PROCEDURE — 76775 US EXAM ABDO BACK WALL LIM: CPT | Mod: TC

## 2021-10-14 DIAGNOSIS — M10.072 IDIOPATHIC GOUT OF LEFT FOOT, UNSPECIFIED CHRONICITY: ICD-10-CM

## 2021-10-14 RX ORDER — FEBUXOSTAT 40 MG/1
TABLET, FILM COATED ORAL
Qty: 90 TABLET | Refills: 0 | Status: SHIPPED | OUTPATIENT
Start: 2021-10-14 | End: 2022-01-19

## 2021-10-21 RX ORDER — NADOLOL 20 MG/1
TABLET ORAL
Qty: 90 TABLET | Refills: 0 | Status: SHIPPED | OUTPATIENT
Start: 2021-10-21 | End: 2022-05-05

## 2021-11-16 DIAGNOSIS — M17.0 PRIMARY OSTEOARTHRITIS OF BOTH KNEES: Primary | ICD-10-CM

## 2021-11-28 ENCOUNTER — PATIENT OUTREACH (OUTPATIENT)
Dept: ADMINISTRATIVE | Facility: OTHER | Age: 66
End: 2021-11-28
Payer: COMMERCIAL

## 2021-12-02 ENCOUNTER — OFFICE VISIT (OUTPATIENT)
Dept: ORTHOPEDICS | Facility: CLINIC | Age: 66
End: 2021-12-02
Payer: COMMERCIAL

## 2021-12-02 ENCOUNTER — HOSPITAL ENCOUNTER (OUTPATIENT)
Dept: RADIOLOGY | Facility: HOSPITAL | Age: 66
Discharge: HOME | End: 2021-12-02
Attending: ORTHOPAEDIC SURGERY
Payer: COMMERCIAL

## 2021-12-02 VITALS
DIASTOLIC BLOOD PRESSURE: 75 MMHG | HEART RATE: 71 BPM | BODY MASS INDEX: 31.68 KG/M2 | WEIGHT: 239 LBS | SYSTOLIC BLOOD PRESSURE: 109 MMHG | HEIGHT: 73 IN

## 2021-12-02 DIAGNOSIS — M17.0 PRIMARY OSTEOARTHRITIS OF BOTH KNEES: ICD-10-CM

## 2021-12-02 DIAGNOSIS — M21.161 ACQUIRED VARUS DEFORMITY KNEE, RIGHT: ICD-10-CM

## 2021-12-02 DIAGNOSIS — M17.0 PRIMARY OSTEOARTHRITIS OF BOTH KNEES: Primary | ICD-10-CM

## 2021-12-02 DIAGNOSIS — M17.12 ARTHRITIS OF KNEE, LEFT: ICD-10-CM

## 2021-12-02 DIAGNOSIS — M17.11 ARTHRITIS OF KNEE, RIGHT: Primary | ICD-10-CM

## 2021-12-02 DIAGNOSIS — M21.162 ACQUIRED VARUS DEFORMITY KNEE, LEFT: ICD-10-CM

## 2021-12-02 PROCEDURE — 73564 XR KNEE ORTHO BILAT WITH FLEXION: ICD-10-PCS | Mod: 26,,, | Performed by: RADIOLOGY

## 2021-12-02 PROCEDURE — 20610 LARGE JOINT ASPIRATION/INJECTION: BILATERAL KNEE: ICD-10-PCS | Mod: 50,S$GLB,, | Performed by: ORTHOPAEDIC SURGERY

## 2021-12-02 PROCEDURE — 73564 X-RAY EXAM KNEE 4 OR MORE: CPT | Mod: 26,,, | Performed by: RADIOLOGY

## 2021-12-02 PROCEDURE — 99999 PR PBB SHADOW E&M-EST. PATIENT-LVL IV: ICD-10-PCS | Mod: PBBFAC,,, | Performed by: ORTHOPAEDIC SURGERY

## 2021-12-02 PROCEDURE — 99999 PR PBB SHADOW E&M-EST. PATIENT-LVL IV: CPT | Mod: PBBFAC,,, | Performed by: ORTHOPAEDIC SURGERY

## 2021-12-02 PROCEDURE — 99214 PR OFFICE/OUTPT VISIT, EST, LEVL IV, 30-39 MIN: ICD-10-PCS | Mod: 25,S$GLB,, | Performed by: ORTHOPAEDIC SURGERY

## 2021-12-02 PROCEDURE — 99214 OFFICE O/P EST MOD 30 MIN: CPT | Mod: 25,S$GLB,, | Performed by: ORTHOPAEDIC SURGERY

## 2021-12-02 PROCEDURE — 20610 DRAIN/INJ JOINT/BURSA W/O US: CPT | Mod: 50,S$GLB,, | Performed by: ORTHOPAEDIC SURGERY

## 2021-12-02 PROCEDURE — 73564 X-RAY EXAM KNEE 4 OR MORE: CPT | Mod: TC,50

## 2021-12-02 PROCEDURE — 4010F ACE/ARB THERAPY RXD/TAKEN: CPT | Mod: CPTII,S$GLB,, | Performed by: ORTHOPAEDIC SURGERY

## 2021-12-02 PROCEDURE — 4010F PR ACE/ARB THEARPY RXD/TAKEN: ICD-10-PCS | Mod: CPTII,S$GLB,, | Performed by: ORTHOPAEDIC SURGERY

## 2021-12-02 RX ORDER — METHYLPREDNISOLONE ACETATE 80 MG/ML
80 INJECTION, SUSPENSION INTRA-ARTICULAR; INTRALESIONAL; INTRAMUSCULAR; SOFT TISSUE
Status: DISCONTINUED | OUTPATIENT
Start: 2021-12-02 | End: 2021-12-02 | Stop reason: HOSPADM

## 2021-12-02 RX ADMIN — METHYLPREDNISOLONE ACETATE 80 MG: 80 INJECTION, SUSPENSION INTRA-ARTICULAR; INTRALESIONAL; INTRAMUSCULAR; SOFT TISSUE at 04:12

## 2021-12-22 RX ORDER — LISINOPRIL AND HYDROCHLOROTHIAZIDE 12.5; 2 MG/1; MG/1
1 TABLET ORAL DAILY
Qty: 90 TABLET | Refills: 0 | Status: SHIPPED | OUTPATIENT
Start: 2021-12-22 | End: 2022-04-12

## 2022-01-10 DIAGNOSIS — E78.5 HYPERLIPIDEMIA: ICD-10-CM

## 2022-01-10 RX ORDER — ATORVASTATIN CALCIUM 40 MG/1
TABLET, FILM COATED ORAL
Qty: 90 TABLET | Refills: 0 | Status: SHIPPED | OUTPATIENT
Start: 2022-01-10 | End: 2022-05-05

## 2022-01-10 NOTE — TELEPHONE ENCOUNTER
No new care gaps identified.  Powered by [employer]. Reference number: [id number].   1/10/2022 8:25:33 AM CST

## 2022-01-12 ENCOUNTER — PATIENT MESSAGE (OUTPATIENT)
Dept: ENDOSCOPY | Facility: HOSPITAL | Age: 67
End: 2022-01-12
Payer: COMMERCIAL

## 2022-01-19 DIAGNOSIS — M10.072 IDIOPATHIC GOUT OF LEFT FOOT, UNSPECIFIED CHRONICITY: ICD-10-CM

## 2022-01-19 RX ORDER — FEBUXOSTAT 40 MG/1
TABLET, FILM COATED ORAL
Qty: 90 TABLET | Refills: 0 | Status: SHIPPED | OUTPATIENT
Start: 2022-01-19 | End: 2022-05-05

## 2022-01-19 NOTE — TELEPHONE ENCOUNTER
No new care gaps identified.  Powered by [employer]. Reference number: [id number].   1/19/2022 1:15:18 PM CST

## 2022-02-09 ENCOUNTER — PATIENT OUTREACH (OUTPATIENT)
Dept: ADMINISTRATIVE | Facility: OTHER | Age: 67
End: 2022-02-09
Payer: COMMERCIAL

## 2022-02-09 NOTE — PROGRESS NOTES
Health Maintenance Due   Topic Date Due    Shingles Vaccine (2 of 3) 09/05/2017    Colorectal Cancer Screening  11/05/2019    COVID-19 Vaccine (2 - Inadvertent 3-dose series) 04/19/2021     Updates were requested from care everywhere.  Chart was reviewed for overdue Proactive [facility] Encounters (GUILLERMO) topics (CRS, Breast Cancer Screening, Eye exam)  Health Maintenance has been updated.  LINKS immunization registry triggered.  Immunizations were reconciled.

## 2022-02-10 ENCOUNTER — OFFICE VISIT (OUTPATIENT)
Dept: ORTHOPEDICS | Facility: CLINIC | Age: 67
End: 2022-02-10
Payer: COMMERCIAL

## 2022-02-10 VITALS — BODY MASS INDEX: 31.68 KG/M2 | HEIGHT: 73 IN | WEIGHT: 239 LBS

## 2022-02-10 DIAGNOSIS — M17.11 ARTHRITIS OF KNEE, RIGHT: Primary | ICD-10-CM

## 2022-02-10 DIAGNOSIS — M21.161 ACQUIRED VARUS DEFORMITY KNEE, RIGHT: ICD-10-CM

## 2022-02-10 DIAGNOSIS — M21.162 ACQUIRED VARUS DEFORMITY KNEE, LEFT: ICD-10-CM

## 2022-02-10 DIAGNOSIS — M17.12 ARTHRITIS OF KNEE, LEFT: ICD-10-CM

## 2022-02-10 PROCEDURE — 1101F PR PT FALLS ASSESS DOC 0-1 FALLS W/OUT INJ PAST YR: ICD-10-PCS | Mod: CPTII,S$GLB,, | Performed by: ORTHOPAEDIC SURGERY

## 2022-02-10 PROCEDURE — 1101F PT FALLS ASSESS-DOCD LE1/YR: CPT | Mod: CPTII,S$GLB,, | Performed by: ORTHOPAEDIC SURGERY

## 2022-02-10 PROCEDURE — 1160F RVW MEDS BY RX/DR IN RCRD: CPT | Mod: CPTII,S$GLB,, | Performed by: ORTHOPAEDIC SURGERY

## 2022-02-10 PROCEDURE — 99213 PR OFFICE/OUTPT VISIT, EST, LEVL III, 20-29 MIN: ICD-10-PCS | Mod: S$GLB,,, | Performed by: ORTHOPAEDIC SURGERY

## 2022-02-10 PROCEDURE — 1159F PR MEDICATION LIST DOCUMENTED IN MEDICAL RECORD: ICD-10-PCS | Mod: CPTII,S$GLB,, | Performed by: ORTHOPAEDIC SURGERY

## 2022-02-10 PROCEDURE — 99213 OFFICE O/P EST LOW 20 MIN: CPT | Mod: S$GLB,,, | Performed by: ORTHOPAEDIC SURGERY

## 2022-02-10 PROCEDURE — 1125F AMNT PAIN NOTED PAIN PRSNT: CPT | Mod: CPTII,S$GLB,, | Performed by: ORTHOPAEDIC SURGERY

## 2022-02-10 PROCEDURE — 99999 PR PBB SHADOW E&M-EST. PATIENT-LVL III: CPT | Mod: PBBFAC,,, | Performed by: ORTHOPAEDIC SURGERY

## 2022-02-10 PROCEDURE — 1160F PR REVIEW ALL MEDS BY PRESCRIBER/CLIN PHARMACIST DOCUMENTED: ICD-10-PCS | Mod: CPTII,S$GLB,, | Performed by: ORTHOPAEDIC SURGERY

## 2022-02-10 PROCEDURE — 3008F BODY MASS INDEX DOCD: CPT | Mod: CPTII,S$GLB,, | Performed by: ORTHOPAEDIC SURGERY

## 2022-02-10 PROCEDURE — 1159F MED LIST DOCD IN RCRD: CPT | Mod: CPTII,S$GLB,, | Performed by: ORTHOPAEDIC SURGERY

## 2022-02-10 PROCEDURE — 99999 PR PBB SHADOW E&M-EST. PATIENT-LVL III: ICD-10-PCS | Mod: PBBFAC,,, | Performed by: ORTHOPAEDIC SURGERY

## 2022-02-10 PROCEDURE — 1125F PR PAIN SEVERITY QUANTIFIED, PAIN PRESENT: ICD-10-PCS | Mod: CPTII,S$GLB,, | Performed by: ORTHOPAEDIC SURGERY

## 2022-02-10 PROCEDURE — 3008F PR BODY MASS INDEX (BMI) DOCUMENTED: ICD-10-PCS | Mod: CPTII,S$GLB,, | Performed by: ORTHOPAEDIC SURGERY

## 2022-02-10 PROCEDURE — 3288F FALL RISK ASSESSMENT DOCD: CPT | Mod: CPTII,S$GLB,, | Performed by: ORTHOPAEDIC SURGERY

## 2022-02-10 PROCEDURE — 3288F PR FALLS RISK ASSESSMENT DOCUMENTED: ICD-10-PCS | Mod: CPTII,S$GLB,, | Performed by: ORTHOPAEDIC SURGERY

## 2022-02-10 NOTE — PATIENT INSTRUCTIONS
Continue with the meloxicam on as needed basis  You also could take Tylenol if you needed 650 mg twice a day  Continue being very active and do your range of motion because arthritis will stiffen up her knees  You doing well right now we will hold off on injecting the rooster comb gel  I will see you back in 3 months

## 2022-02-10 NOTE — PROGRESS NOTES
Subjective:     Patient ID: Byron Styles is a 66 y.o. male.    Chief Complaint: Pain of the Left Knee and Pain of the Right Knee   10/08/2020  HPI:  65-year-old male who states been having pain in both of his knees for almost 6 years.  Denies any history of trauma.  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  He occasionally takes Aleve may be 3 times a week for a foot problem that he just developed and seen Dr. Gr.  He does have history of gout .  Also had some numbness and neuropathy in his feet recently started by Podiatry on Neurontin 100 mg and that seems to ease things up for him.  Makes him a little bit sleepy and he did not want increase the dose.   pain in the knees are 4/10 worst with activities.  He does give history of Raynaud disease sometimes periphery gets severely cold.  Denies any treatment in the legs in the form of any injections or treatment for arthritis.  Did have an ultrasound to the legs which was negative for DVT recently and that because his feet swell up on him.  Denies any fever or chills or shortness of breath or difficulty with chewing swallowing loss of bowel or bladder control or blurry vision or double vision or chest pain    11/19/2020  Patient stated the meloxicam seems to help helped significantly and not having any pain in his knees anymore.  He is doing really well at this point in time.  He will be finishing taking his gabapentin for his primary care by the end of this prescription since his feet are doing better.  Overall he is doing well.  He is still remaining active at work.  Denies any fever or chills or shortness of breath or difficulty with chewing or swallowing loss of bowel bladder control or blurry vision double vision or loss of sense smell taste  His pain level is 0/10 today and states the meloxicam worked great.  Blood pressure 116/74, pulse 73    12/02/2021  Bilateral knee arthritis.  He was doing  really well on the meloxicam and doing some independent exercises.  He was taken off by primary care because being on anti-inflammatories for long period of time could cause gastrointestinal, liver and kidney disease.  I did tell him taking it twice or 3 times a week should not be a big issue.  We can follow this with blood work every 6 months.  His pain is around 7/10 he has tries to take Tylenol for it.  He does take his medication for gout and his uric acid last evaluation was down to normal at 5.4.  He does ambulate without any assistive devices.  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  He does dabbed with [employer] With work and would working on his own.  He does ambulate without any assistive devices.  His pain is 7/10  No fever no chills no shortness of breath or difficulty with chewing or swallowing loss of bowel bladder control      02/10/2022  Bilateral knee severe arthritis and varus deformity.  We gave him injections of 80 mg Depo-Medrol in each knee on 12/02/2021 with great relief of his pain.  Now he is taking meloxicam maybe twice a week occasional Tylenol.  He is ambulating without any assistive devices.  We did get him approved for viscosupplementation/Synvisc-One but at this time since he is doing well we will hold off.  I described Synvisc-One and viscosupplementation to him and I went over his x-rays again with him today.  Long discussion today about total knee replacement and he wanted to ask me all these questions and I brought in the model showed him x-ray about people have it and how it is done and wise it outpatient surgery at we spent quite a bit of time going over the surgery itself.  He is not there at this point he is doing really well his pain level is 1/10.  He wants to avoid surgery right now since he is doing better.    Past Medical History:   Diagnosis Date    Colon polyps 2009    Depression     Hyperlipidemia     Hypertension     Localized osteoarthrosis not specified  whether primary or secondary, lower leg 4/6/2015    Raynaud's disease     Skin cancer of arm, left 05/2020    [facility]     Past Surgical History:   Procedure Laterality Date    COLONOSCOPY W/ POLYPECTOMY  2009    MOUTH SURGERY      WISDOM TOOTH EXTRACTION       Family History   Problem Relation Age of Onset    Heart disease Father 65        CABG    Cancer Father         Bladder    Heart disease Brother 68        CABG    Depression Mother     Heart disease Mother         CHF     Social History     Socioeconomic History    Marital status: Single    Number of children: 0   Occupational History    Occupation:      Employer: [employer]   Tobacco Use    Smoking status: Never Smoker    Smokeless tobacco: Current User     Types: Snuff   Substance and Sexual Activity    Alcohol use: Yes     Comment: social/ occassional    Drug use: No    Sexual activity: Not Currently     Medication List with Changes/Refills   Current Medications    ASPIRIN (ECOTRIN) 81 MG EC TABLET    Take 81 mg by mouth once daily.    ATORVASTATIN (LIPITOR) 40 MG TABLET    TAKE 1 TABLET BY MOUTH ONCE DAILY IN THE EVENING    FEBUXOSTAT (ULORIC) 40 MG TAB    Take 1 tablet by mouth once daily    FLU VAC 2021 [id number]-NSWTF22I,PF, (FLUAD QUAD) 60 MCG (15 MCG X 4)/0.5 ML SYRG    Use as directed    GABAPENTIN (NEURONTIN) 100 MG CAPSULE    Take 1 capsule (100 mg total) by mouth 2 (two) times daily.    LISINOPRIL-HYDROCHLOROTHIAZIDE (PRINZIDE,ZESTORETIC) 20-12.5 MG PER TABLET    Take 1 tablet by mouth once daily    MELOXICAM (MOBIC) 15 MG TABLET    Take 1 tablet by mouth once daily with food    MULTIVITAMIN CAPSULE    Take 1 capsule by mouth once daily.    NADOLOL (CORGARD) 20 MG TABLET    Take 1 tablet by mouth once daily    SERTRALINE (ZOLOFT) 100 MG TABLET    Take 1 tablet by mouth once daily     Review of patient's allergies indicates:  No Known Allergies  Review of Systems   Constitutional:  Negative for decreased appetite.   HENT: Negative for tinnitus.    Eyes: Negative for double vision.   Cardiovascular: Negative for chest pain.   Respiratory: Negative for wheezing.    Hematologic/Lymphatic: Negative for bleeding problem.   Skin: Negative for dry skin.   Musculoskeletal: Positive for gout, joint pain and stiffness. Negative for arthritis, back pain and neck pain.   Gastrointestinal: Negative for abdominal pain.   Genitourinary: Negative for bladder incontinence.   Neurological: Negative for numbness, paresthesias and sensory change.   Psychiatric/Behavioral: Negative for altered mental status.       Objective:   Body mass index is 31.53 kg/m².  There were no vitals filed for this visit.       General    Constitutional: He is oriented to person, place, and time. He appears well-developed.   HENT:   Head: Atraumatic.   Eyes: EOM are normal.   Cardiovascular: Normal rate.    Pulmonary/Chest: Effort normal.   Neurological: He is alert and oriented to person, place, and time.   Psychiatric: Judgment normal.            Cervical rotation is very functional  Ambulate with very mild limp  Bilateral upper extremity neurovascularly intact.  Very faint radial and ulnar pulses.  Greater than 3 sec capillary refill in the fingers due to Raynaud's phenomena.  Strength is 5/5 throughout motor groups  Lumbar without pain  Pelvis is level  Bilateral hips passive motion no pain no pain to palpation over the trochanters  Hip flexors, abductors, adductors, quads, hamstrings, ankle extensors and flexors inverters and everters all 5/5  Bilateral knee with varus deformity.  He does have mild tenderness medial  joint and crepitus to compression on the patella.  Range of motion he has 5° of flexion contractures and flexes 120°.  Collaterals and cruciates are stable.  Mild swelling.  Questionable Lola sign  Calves are soft nontender  Minimal varicosities  Ankle motion intact  DP 1+  Skin is warm to touch no obvious  lesions    Relevant imaging results reviewed and interpreted by me, discussed with the patient and / or family today   X-ray 12/02/2021 bilateral knees with complete loss of medial joint space with marginal osteophytes consistent of bilateral knees arthrosis with varus deformity  X-ray 09/10/2020 bilateral knees showing medial joint narrowing almost 80% with osteophytic changes and cystic degeneration consistent with bilateral knee arthrosis  Assessment:     Encounter Diagnoses   Name Primary?    Arthritis of knee, right Yes    Acquired varus deformity knee, right     Arthritis of knee, left     Acquired varus deformity knee, left         Plan:   Arthritis of knee, right    Acquired varus deformity knee, right    Arthritis of knee, left    Acquired varus deformity knee, left         Patient Instructions   Continue with the meloxicam on as needed basis  You also could take Tylenol if you needed 650 mg twice a day  Continue being very active and do your range of motion because arthritis will stiffen up her knees  You doing well right now we will hold off on injecting the rooster comb gel  I will see you back in 3 months        Kellgren Dick scale 3. Both knees    Patient had a lot of questions about total knee replacement and how it is performed and what is the outcomes and all of that with discussed in details with him.  We showed him on the model we showed him x-rays that have total knee replacement.  I discussed how I control the pain with him and how why inject the inside of the knee and do my own blocks to allow discharge the same day and decrease pain.  I did tell him also that all of the work depends on the patient doing the therapy and range of motion otherwise I will stiffen up on them.  I did showing x-rays showed him on the model described how it is done.  He is doing well now he wants to hold off on having the viscosupplementation/Synvisc-One injection until next visit  They are approved until May  10/Synvisc-One  Disclaimer: This note was prepared using a voice recognition system and is likely to have sound alike errors within the text.

## 2022-04-11 NOTE — TELEPHONE ENCOUNTER
Care Due:                  Date            Visit Type   Department     Provider  --------------------------------------------------------------------------------                                EP -                              PRIMARY      [facility] INTERNAL  Last Visit: 09-      [facility] ([facility])   MEDICINE       Ravindra Bernard  Next Visit: None Scheduled  None         None Found                                                            Last  Test          Frequency    Reason                     Performed    Due Date  --------------------------------------------------------------------------------    Office Visit  12 months..  atorvastatin, febuxostat,   09-   09-                             lisinopriL-hydrochlorothi                             azide, sertraline........    Powered by [employer]. Reference number: [id number].   4/11/2022 5:19:30 PM CDT

## 2022-04-12 RX ORDER — LISINOPRIL AND HYDROCHLOROTHIAZIDE 12.5; 2 MG/1; MG/1
1 TABLET ORAL DAILY
Qty: 90 TABLET | Refills: 0 | Status: SHIPPED | OUTPATIENT
Start: 2022-04-12 | End: 2022-08-02

## 2022-04-12 NOTE — TELEPHONE ENCOUNTER
Refill Routing Note   Medication(s) are not appropriate for processing by [facility] for the following reason(s):      - Patient has not been seen in over 15 months by PCP  - Required laboratory values are abnormal    [facility] action(s):  Defer Medication-related problems identified: Requires appointment        Medication reconciliation completed: No     Appointments  past 12m or future 3m with PCP    Date Provider   Last Visit   9/10/2020 Ravindra Bernard MD   Next Visit   Visit date not found Ravindra Bernard MD   ED visits in past 90 days: 0        Note composed:4:40 PM 04/12/2022

## 2022-05-04 DIAGNOSIS — E78.5 HYPERLIPIDEMIA: ICD-10-CM

## 2022-05-04 DIAGNOSIS — M10.072 IDIOPATHIC GOUT OF LEFT FOOT, UNSPECIFIED CHRONICITY: ICD-10-CM

## 2022-05-04 NOTE — TELEPHONE ENCOUNTER
No new care gaps identified.  [facility] Embedded Care Gaps. Reference number: [id number]. 5/04/2022   6:02:13 PM CDT

## 2022-05-04 NOTE — TELEPHONE ENCOUNTER
Refill Routing Note   Medication(s) are not appropriate for processing by [facility] for the following reason(s):      - Patient has not been seen in over 15 months by PCP    [facility] action(s):  Defer Medication-related problems identified: Requires appointment        Medication reconciliation completed: No     Appointments  past 12m or future 3m with PCP    Date Provider   Last Visit   9/10/2020 Ravindra Bernard MD   Next Visit   6/16/2022 Ravindra Bernard MD   ED visits in past 90 days: 0        Note composed:6:05 PM 05/04/2022

## 2022-05-05 RX ORDER — NADOLOL 20 MG/1
TABLET ORAL
Qty: 90 TABLET | Refills: 0 | Status: SHIPPED | OUTPATIENT
Start: 2022-05-05 | End: 2022-11-16

## 2022-05-05 RX ORDER — ATORVASTATIN CALCIUM 40 MG/1
TABLET, FILM COATED ORAL
Qty: 90 TABLET | Refills: 0 | Status: SHIPPED | OUTPATIENT
Start: 2022-05-05 | End: 2022-08-31

## 2022-05-05 RX ORDER — SERTRALINE HYDROCHLORIDE 100 MG/1
TABLET, FILM COATED ORAL
Qty: 90 TABLET | Refills: 0 | Status: SHIPPED | OUTPATIENT
Start: 2022-05-05 | End: 2022-08-25

## 2022-05-05 RX ORDER — FEBUXOSTAT 40 MG/1
TABLET, FILM COATED ORAL
Qty: 90 TABLET | Refills: 0 | Status: SHIPPED | OUTPATIENT
Start: 2022-05-05 | End: 2022-08-31

## 2022-06-16 ENCOUNTER — OFFICE VISIT (OUTPATIENT)
Dept: INTERNAL MEDICINE | Facility: CLINIC | Age: 67
End: 2022-06-16
Payer: COMMERCIAL

## 2022-06-16 VITALS
SYSTOLIC BLOOD PRESSURE: 90 MMHG | DIASTOLIC BLOOD PRESSURE: 68 MMHG | HEART RATE: 68 BPM | TEMPERATURE: 97 F | OXYGEN SATURATION: 98 % | HEIGHT: 73 IN | WEIGHT: 230.38 LBS | BODY MASS INDEX: 30.53 KG/M2

## 2022-06-16 DIAGNOSIS — Z12.11 COLON CANCER SCREENING: ICD-10-CM

## 2022-06-16 DIAGNOSIS — F32.A DEPRESSION, UNSPECIFIED DEPRESSION TYPE: ICD-10-CM

## 2022-06-16 DIAGNOSIS — I10 ESSENTIAL HYPERTENSION: ICD-10-CM

## 2022-06-16 DIAGNOSIS — Z00.00 ROUTINE GENERAL MEDICAL EXAMINATION AT HEALTH CARE FACILITY: Primary | ICD-10-CM

## 2022-06-16 DIAGNOSIS — E78.2 MIXED HYPERLIPIDEMIA: ICD-10-CM

## 2022-06-16 PROCEDURE — 3288F PR FALLS RISK ASSESSMENT DOCUMENTED: ICD-10-PCS | Mod: CPTII,S$GLB,, | Performed by: INTERNAL MEDICINE

## 2022-06-16 PROCEDURE — 3288F FALL RISK ASSESSMENT DOCD: CPT | Mod: CPTII,S$GLB,, | Performed by: INTERNAL MEDICINE

## 2022-06-16 PROCEDURE — 1101F PR PT FALLS ASSESS DOC 0-1 FALLS W/OUT INJ PAST YR: ICD-10-PCS | Mod: CPTII,S$GLB,, | Performed by: INTERNAL MEDICINE

## 2022-06-16 PROCEDURE — 3078F PR MOST RECENT DIASTOLIC BLOOD PRESSURE < 80 MM HG: ICD-10-PCS | Mod: CPTII,S$GLB,, | Performed by: INTERNAL MEDICINE

## 2022-06-16 PROCEDURE — 99397 PER PM REEVAL EST PAT 65+ YR: CPT | Mod: S$GLB,,, | Performed by: INTERNAL MEDICINE

## 2022-06-16 PROCEDURE — 3074F PR MOST RECENT SYSTOLIC BLOOD PRESSURE < 130 MM HG: ICD-10-PCS | Mod: CPTII,S$GLB,, | Performed by: INTERNAL MEDICINE

## 2022-06-16 PROCEDURE — 99397 PR PREVENTIVE VISIT,EST,65 & OVER: ICD-10-PCS | Mod: S$GLB,,, | Performed by: INTERNAL MEDICINE

## 2022-06-16 PROCEDURE — 1159F MED LIST DOCD IN RCRD: CPT | Mod: CPTII,S$GLB,, | Performed by: INTERNAL MEDICINE

## 2022-06-16 PROCEDURE — 99999 PR PBB SHADOW E&M-EST. PATIENT-LVL IV: ICD-10-PCS | Mod: PBBFAC,,, | Performed by: INTERNAL MEDICINE

## 2022-06-16 PROCEDURE — 1160F PR REVIEW ALL MEDS BY PRESCRIBER/CLIN PHARMACIST DOCUMENTED: ICD-10-PCS | Mod: CPTII,S$GLB,, | Performed by: INTERNAL MEDICINE

## 2022-06-16 PROCEDURE — 3078F DIAST BP <80 MM HG: CPT | Mod: CPTII,S$GLB,, | Performed by: INTERNAL MEDICINE

## 2022-06-16 PROCEDURE — 1126F AMNT PAIN NOTED NONE PRSNT: CPT | Mod: CPTII,S$GLB,, | Performed by: INTERNAL MEDICINE

## 2022-06-16 PROCEDURE — 4010F ACE/ARB THERAPY RXD/TAKEN: CPT | Mod: CPTII,S$GLB,, | Performed by: INTERNAL MEDICINE

## 2022-06-16 PROCEDURE — 1126F PR PAIN SEVERITY QUANTIFIED, NO PAIN PRESENT: ICD-10-PCS | Mod: CPTII,S$GLB,, | Performed by: INTERNAL MEDICINE

## 2022-06-16 PROCEDURE — 1160F RVW MEDS BY RX/DR IN RCRD: CPT | Mod: CPTII,S$GLB,, | Performed by: INTERNAL MEDICINE

## 2022-06-16 PROCEDURE — 3008F BODY MASS INDEX DOCD: CPT | Mod: CPTII,S$GLB,, | Performed by: INTERNAL MEDICINE

## 2022-06-16 PROCEDURE — 3074F SYST BP LT 130 MM HG: CPT | Mod: CPTII,S$GLB,, | Performed by: INTERNAL MEDICINE

## 2022-06-16 PROCEDURE — 1159F PR MEDICATION LIST DOCUMENTED IN MEDICAL RECORD: ICD-10-PCS | Mod: CPTII,S$GLB,, | Performed by: INTERNAL MEDICINE

## 2022-06-16 PROCEDURE — 4010F PR ACE/ARB THEARPY RXD/TAKEN: ICD-10-PCS | Mod: CPTII,S$GLB,, | Performed by: INTERNAL MEDICINE

## 2022-06-16 PROCEDURE — 1101F PT FALLS ASSESS-DOCD LE1/YR: CPT | Mod: CPTII,S$GLB,, | Performed by: INTERNAL MEDICINE

## 2022-06-16 PROCEDURE — 99999 PR PBB SHADOW E&M-EST. PATIENT-LVL IV: CPT | Mod: PBBFAC,,, | Performed by: INTERNAL MEDICINE

## 2022-06-16 PROCEDURE — 3008F PR BODY MASS INDEX (BMI) DOCUMENTED: ICD-10-PCS | Mod: CPTII,S$GLB,, | Performed by: INTERNAL MEDICINE

## 2022-06-16 NOTE — PROGRESS NOTES
Subjective:       Patient ID: Byron Styles is a 67 y.o. male.    Chief Complaint: Follow-up    HPI Patient is a 67-year-old male presenting today for updated physical exam review of chronic health issues.  Patient has history of hypertension, hyperlipidemia, depression.  In regards to his chronic issues he relates he is doing well.  He is taking his medications as prescribed without adverse effects.  He is tolerating the medications well there has been no evidence of cardiac decompensation.  His depression has been stable with no exacerbations.    Patient is slightly overdue for colon cancer screening.  He has had issues as he does not have any family locally and has not been able to find a way to get a ride to and from the colonoscopy.  Unfortunately his initial colonoscopy did have a couple of polyps on it so he is not a candidate for Cologuard or fit kit testing.  We discussed friends or coworkers as an option and he indicates he just has not been able to find somebody at this point.  He is interested in getting the scope done on but this issue with the ride has been problematic.    Review of Systems   Constitutional: Negative for fever and unexpected weight change.   HENT: Negative for hearing loss, postnasal drip and rhinorrhea.    Eyes: Negative for pain and visual disturbance.   Respiratory: Negative for cough, shortness of breath and wheezing.    Cardiovascular: Negative for chest pain and palpitations.   Gastrointestinal: Negative for constipation, diarrhea, nausea and vomiting.   Genitourinary: Negative for dysuria and hematuria.   Musculoskeletal: Negative for arthralgias, back pain, myalgias and neck stiffness.   Skin: Negative for pallor and rash.   Neurological: Negative for seizures, syncope and headaches.   Hematological: Negative for adenopathy.   Psychiatric/Behavioral: Negative for dysphoric mood. The patient is not nervous/anxious.        Objective:   BP 90/68   Pulse 68   Temp 97 °F (36.1  "°C) (Temporal)   Ht 6' 1" (1.854 m)   Wt 104.5 kg (230 lb 6.1 oz)   SpO2 98%   BMI 30.40 kg/m²      Physical Exam  Vitals reviewed.   Constitutional:       General: He is not in acute distress.     Appearance: He is well-developed.   HENT:      Head: Normocephalic and atraumatic.      Right Ear: Tympanic membrane and ear canal normal.      Left Ear: Tympanic membrane and ear canal normal.   Eyes:      Pupils: Pupils are equal, round, and reactive to light.   Neck:      Thyroid: No thyromegaly.      Vascular: No JVD.   Cardiovascular:      Rate and Rhythm: Normal rate and regular rhythm.      Heart sounds: Normal heart sounds. No murmur heard.    No friction rub. No gallop.   Pulmonary:      Effort: Pulmonary effort is normal.      Breath sounds: Normal breath sounds. No wheezing or rales.   Abdominal:      General: Bowel sounds are normal. There is no distension.      Palpations: Abdomen is soft.      Tenderness: There is no abdominal tenderness. There is no guarding or rebound.   Musculoskeletal:         General: Normal range of motion.      Cervical back: Normal range of motion and neck supple.   Lymphadenopathy:      Cervical: No cervical adenopathy.   Skin:     General: Skin is warm and dry.      Findings: No rash.   Neurological:      General: No focal deficit present.      Mental Status: He is alert and oriented to person, place, and time.      Cranial Nerves: No cranial nerve deficit.      Deep Tendon Reflexes: Reflexes are normal and symmetric.   Psychiatric:         Mood and Affect: Mood normal.         Judgment: Judgment normal.             Assessment:       1. Routine general medical examination at health care facility    2. Essential hypertension    3. Mixed hyperlipidemia    4. Depression, unspecified depression type    5. Colon cancer screening        Plan:   Essential hypertension  Blood pressure is under good control.  We will continue the current regimen.  Will work on regular aerobic exercise " and a low salt diet.        Hyperlipidemia  Cholesterol numbers look good.  We will continue the current regimen at this time.  Remain focused on low fat diet and high dietary fiber intake.      Depression  Continue zoloft, doing well.    Routine general medical examination at health care facility  Comments:  Focus on good health habits, low fat diet, regular exercise, seatbelt use, sunscreen use  Orders:  -     CBC Auto Differential; Future; Expected date: 08/30/2022  -     Comprehensive Metabolic Panel; Future; Expected date: 08/30/2022  -     Lipid Panel; Future; Expected date: 08/30/2022  -     PSA, Screening; Future; Expected date: 08/30/2022    Essential hypertension    Mixed hyperlipidemia    Depression, unspecified depression type    Colon cancer screening    Patient will continue work on the issue of a ride for the colonoscopy.  If he is able to come up with a solution for that he will let us know.  If we are able to determine solution we will try to get move for getting him set up at that time as well.  Will do follow-up lab work in the fall and plan to see him back in 1 year or sooner based upon labs or other issues.      Follow up in about 1 year (around 6/16/2023).

## 2022-08-02 ENCOUNTER — HOSPITAL ENCOUNTER (OUTPATIENT)
Dept: RADIOLOGY | Facility: HOSPITAL | Age: 67
Discharge: HOME | End: 2022-08-02
Attending: NURSE PRACTITIONER
Payer: COMMERCIAL

## 2022-08-02 ENCOUNTER — OFFICE VISIT (OUTPATIENT)
Dept: INTERNAL MEDICINE | Facility: CLINIC | Age: 67
End: 2022-08-02
Payer: COMMERCIAL

## 2022-08-02 VITALS
OXYGEN SATURATION: 98 % | SYSTOLIC BLOOD PRESSURE: 124 MMHG | WEIGHT: 245.38 LBS | BODY MASS INDEX: 32.37 KG/M2 | TEMPERATURE: 98 F | HEART RATE: 88 BPM | DIASTOLIC BLOOD PRESSURE: 88 MMHG

## 2022-08-02 DIAGNOSIS — R19.02 LEFT UPPER QUADRANT ABDOMINAL SWELLING, MASS AND LUMP: ICD-10-CM

## 2022-08-02 DIAGNOSIS — I10 ESSENTIAL HYPERTENSION: ICD-10-CM

## 2022-08-02 DIAGNOSIS — R19.02 LEFT UPPER QUADRANT ABDOMINAL SWELLING, MASS AND LUMP: Primary | ICD-10-CM

## 2022-08-02 PROCEDURE — 99999 PR PBB SHADOW E&M-EST. PATIENT-LVL IV: CPT | Mod: PBBFAC,,, | Performed by: NURSE PRACTITIONER

## 2022-08-02 PROCEDURE — 3008F BODY MASS INDEX DOCD: CPT | Mod: CPTII,S$GLB,, | Performed by: NURSE PRACTITIONER

## 2022-08-02 PROCEDURE — 3288F FALL RISK ASSESSMENT DOCD: CPT | Mod: CPTII,S$GLB,, | Performed by: NURSE PRACTITIONER

## 2022-08-02 PROCEDURE — 1159F PR MEDICATION LIST DOCUMENTED IN MEDICAL RECORD: ICD-10-PCS | Mod: CPTII,S$GLB,, | Performed by: NURSE PRACTITIONER

## 2022-08-02 PROCEDURE — 3288F PR FALLS RISK ASSESSMENT DOCUMENTED: ICD-10-PCS | Mod: CPTII,S$GLB,, | Performed by: NURSE PRACTITIONER

## 2022-08-02 PROCEDURE — 3074F PR MOST RECENT SYSTOLIC BLOOD PRESSURE < 130 MM HG: ICD-10-PCS | Mod: CPTII,S$GLB,, | Performed by: NURSE PRACTITIONER

## 2022-08-02 PROCEDURE — 3008F PR BODY MASS INDEX (BMI) DOCUMENTED: ICD-10-PCS | Mod: CPTII,S$GLB,, | Performed by: NURSE PRACTITIONER

## 2022-08-02 PROCEDURE — 76705 US SOFT TISSUE, ABDOMEN: ICD-10-PCS | Mod: 26,,, | Performed by: RADIOLOGY

## 2022-08-02 PROCEDURE — 3079F DIAST BP 80-89 MM HG: CPT | Mod: CPTII,S$GLB,, | Performed by: NURSE PRACTITIONER

## 2022-08-02 PROCEDURE — 1126F PR PAIN SEVERITY QUANTIFIED, NO PAIN PRESENT: ICD-10-PCS | Mod: CPTII,S$GLB,, | Performed by: NURSE PRACTITIONER

## 2022-08-02 PROCEDURE — 4010F ACE/ARB THERAPY RXD/TAKEN: CPT | Mod: CPTII,S$GLB,, | Performed by: NURSE PRACTITIONER

## 2022-08-02 PROCEDURE — 3074F SYST BP LT 130 MM HG: CPT | Mod: CPTII,S$GLB,, | Performed by: NURSE PRACTITIONER

## 2022-08-02 PROCEDURE — 1126F AMNT PAIN NOTED NONE PRSNT: CPT | Mod: CPTII,S$GLB,, | Performed by: NURSE PRACTITIONER

## 2022-08-02 PROCEDURE — 1160F RVW MEDS BY RX/DR IN RCRD: CPT | Mod: CPTII,S$GLB,, | Performed by: NURSE PRACTITIONER

## 2022-08-02 PROCEDURE — 76705 ECHO EXAM OF ABDOMEN: CPT | Mod: TC

## 2022-08-02 PROCEDURE — 76705 ECHO EXAM OF ABDOMEN: CPT | Mod: 26,,, | Performed by: RADIOLOGY

## 2022-08-02 PROCEDURE — 1160F PR REVIEW ALL MEDS BY PRESCRIBER/CLIN PHARMACIST DOCUMENTED: ICD-10-PCS | Mod: CPTII,S$GLB,, | Performed by: NURSE PRACTITIONER

## 2022-08-02 PROCEDURE — 99214 OFFICE O/P EST MOD 30 MIN: CPT | Mod: S$GLB,,, | Performed by: NURSE PRACTITIONER

## 2022-08-02 PROCEDURE — 99214 PR OFFICE/OUTPT VISIT, EST, LEVL IV, 30-39 MIN: ICD-10-PCS | Mod: S$GLB,,, | Performed by: NURSE PRACTITIONER

## 2022-08-02 PROCEDURE — 99999 PR PBB SHADOW E&M-EST. PATIENT-LVL IV: ICD-10-PCS | Mod: PBBFAC,,, | Performed by: NURSE PRACTITIONER

## 2022-08-02 PROCEDURE — 4010F PR ACE/ARB THEARPY RXD/TAKEN: ICD-10-PCS | Mod: CPTII,S$GLB,, | Performed by: NURSE PRACTITIONER

## 2022-08-02 PROCEDURE — 3079F PR MOST RECENT DIASTOLIC BLOOD PRESSURE 80-89 MM HG: ICD-10-PCS | Mod: CPTII,S$GLB,, | Performed by: NURSE PRACTITIONER

## 2022-08-02 PROCEDURE — 1101F PT FALLS ASSESS-DOCD LE1/YR: CPT | Mod: CPTII,S$GLB,, | Performed by: NURSE PRACTITIONER

## 2022-08-02 PROCEDURE — 1101F PR PT FALLS ASSESS DOC 0-1 FALLS W/OUT INJ PAST YR: ICD-10-PCS | Mod: CPTII,S$GLB,, | Performed by: NURSE PRACTITIONER

## 2022-08-02 PROCEDURE — 1159F MED LIST DOCD IN RCRD: CPT | Mod: CPTII,S$GLB,, | Performed by: NURSE PRACTITIONER

## 2022-08-02 NOTE — PROGRESS NOTES
Subjective:       Patient ID: Byron Styles is a 67 y.o. male.    Chief Complaint: Mass    Pt reports to clinic today for a lump/mass   Noticed it about 4 weeks ago  Denies pain  Remains the same size  Concerned because it was never there before    BP controlled in clinic today   Controlled on lisinopril/hctz, nadolol  Doing well       /88   Pulse 88   Temp 97.5 °F (36.4 °C) (Temporal)   Wt 111.3 kg (245 lb 6 oz)   SpO2 98%   BMI 32.37 kg/m²     Review of Systems   Constitutional: Negative for activity change and unexpected weight change.   HENT: Negative for hearing loss, rhinorrhea and trouble swallowing.    Eyes: Negative for discharge and visual disturbance.   Respiratory: Negative for chest tightness and wheezing.    Cardiovascular: Negative for chest pain and palpitations.   Gastrointestinal: Negative for blood in stool, constipation, diarrhea and vomiting.   Endocrine: Negative for polydipsia and polyuria.   Genitourinary: Negative for difficulty urinating, hematuria and urgency.   Musculoskeletal: Positive for arthralgias. Negative for joint swelling and neck pain.   Skin: Positive for wound.   Neurological: Negative for weakness and headaches.   Psychiatric/Behavioral: Negative for confusion and dysphoric mood.       Objective:      Physical Exam  Vitals and nursing note reviewed.   Constitutional:       General: He is not in acute distress.     Appearance: He is well-developed. He is not diaphoretic.   HENT:      Head: Normocephalic and atraumatic.   Cardiovascular:      Rate and Rhythm: Normal rate and regular rhythm.      Heart sounds: Normal heart sounds.   Pulmonary:      Effort: Pulmonary effort is normal. No respiratory distress.      Breath sounds: Normal breath sounds.   Abdominal:      Palpations: There is mass.       Skin:     General: Skin is warm and dry.      Findings: No rash.   Neurological:      Mental Status: He is alert and oriented to person, place, and time.    Psychiatric:         Behavior: Behavior normal.         Thought Content: Thought content normal.         Judgment: Judgment normal.         Assessment:       1. Left upper quadrant abdominal swelling, mass and lump    2. Essential hypertension    3. BMI 32.0-32.9,adult        Plan:       Byron was seen today for mass.    Diagnoses and all orders for this visit:    Left upper quadrant abdominal swelling, mass and lump  -     US Abdomen Complete; Future  - Will notify pt of results     Essential hypertension        - Chronic, stable- continue lisinopril/hctz and nadolol     BMI 32.0-32.9,adult      Follow up for worsening symptoms and PRN  Labs as scheduled 8/30  Colonoscopy also discussed.

## 2022-08-25 RX ORDER — SERTRALINE HYDROCHLORIDE 100 MG/1
100 TABLET, FILM COATED ORAL DAILY
Qty: 90 TABLET | Refills: 3 | Status: SHIPPED | OUTPATIENT
Start: 2022-08-25 | End: 2023-09-12

## 2022-08-25 NOTE — TELEPHONE ENCOUNTER
Refill Decision Note   Byron Styles  is requesting a refill authorization.  Brief Assessment and Rationale for Refill:  Approve     Medication Therapy Plan:       Medication Reconciliation Completed: No   Comments:     No Care Gaps recommended.     Note composed:12:03 PM 08/25/2022

## 2022-08-25 NOTE — TELEPHONE ENCOUNTER
No new care gaps identified.  [facility] Embedded Care Gaps. Reference number: [id number]. 8/25/2022   8:23:58 AM HOSEAT

## 2022-08-30 ENCOUNTER — OFFICE VISIT (OUTPATIENT)
Dept: INTERNAL MEDICINE | Facility: CLINIC | Age: 67
End: 2022-08-30
Payer: COMMERCIAL

## 2022-08-30 ENCOUNTER — LAB VISIT (OUTPATIENT)
Dept: LAB | Facility: HOSPITAL | Age: 67
End: 2022-08-30
Attending: INTERNAL MEDICINE
Payer: COMMERCIAL

## 2022-08-30 VITALS
HEIGHT: 73 IN | HEART RATE: 74 BPM | BODY MASS INDEX: 32.29 KG/M2 | DIASTOLIC BLOOD PRESSURE: 84 MMHG | SYSTOLIC BLOOD PRESSURE: 126 MMHG | TEMPERATURE: 98 F | OXYGEN SATURATION: 95 % | WEIGHT: 243.63 LBS

## 2022-08-30 DIAGNOSIS — I10 ESSENTIAL HYPERTENSION: ICD-10-CM

## 2022-08-30 DIAGNOSIS — Z00.00 ROUTINE GENERAL MEDICAL EXAMINATION AT HEALTH CARE FACILITY: ICD-10-CM

## 2022-08-30 DIAGNOSIS — M17.11 ARTHRITIS OF RIGHT KNEE: ICD-10-CM

## 2022-08-30 DIAGNOSIS — M25.551 RIGHT HIP PAIN: Primary | ICD-10-CM

## 2022-08-30 LAB
ALBUMIN SERPL BCP-MCNC: 3.8 G/DL (ref 3.5–5.2)
ALP SERPL-CCNC: 87 U/L (ref 55–135)
ALT SERPL W/O P-5'-P-CCNC: 30 U/L (ref 10–44)
ANION GAP SERPL CALC-SCNC: 9 MMOL/L (ref 8–16)
AST SERPL-CCNC: 24 U/L (ref 10–40)
BASOPHILS # BLD AUTO: 0.05 K/UL (ref 0–0.2)
BASOPHILS NFR BLD: 0.7 % (ref 0–1.9)
BILIRUB SERPL-MCNC: 0.9 MG/DL (ref 0.1–1)
BUN SERPL-MCNC: 14 MG/DL (ref 8–23)
CALCIUM SERPL-MCNC: 9.4 MG/DL (ref 8.7–10.5)
CHLORIDE SERPL-SCNC: 102 MMOL/L (ref 95–110)
CHOLEST SERPL-MCNC: 176 MG/DL (ref 120–199)
CHOLEST/HDLC SERPL: 4.6 {RATIO} (ref 2–5)
CO2 SERPL-SCNC: 28 MMOL/L (ref 23–29)
COMPLEXED PSA SERPL-MCNC: 0.79 NG/ML (ref 0–4)
CREAT SERPL-MCNC: 1 MG/DL (ref 0.5–1.4)
DIFFERENTIAL METHOD: NORMAL
EOSINOPHIL # BLD AUTO: 0.1 K/UL (ref 0–0.5)
EOSINOPHIL NFR BLD: 1.9 % (ref 0–8)
ERYTHROCYTE [DISTWIDTH] IN BLOOD BY AUTOMATED COUNT: 14.3 % (ref 11.5–14.5)
EST. GFR  (NO RACE VARIABLE): >60 ML/MIN/1.73 M^2
GLUCOSE SERPL-MCNC: 94 MG/DL (ref 70–110)
HCT VFR BLD AUTO: 45.7 % (ref 40–54)
HDLC SERPL-MCNC: 38 MG/DL (ref 40–75)
HDLC SERPL: 21.6 % (ref 20–50)
HGB BLD-MCNC: 15.5 G/DL (ref 14–18)
IMM GRANULOCYTES # BLD AUTO: 0.02 K/UL (ref 0–0.04)
IMM GRANULOCYTES NFR BLD AUTO: 0.3 % (ref 0–0.5)
LDLC SERPL CALC-MCNC: 100.2 MG/DL (ref 63–159)
LYMPHOCYTES # BLD AUTO: 1.7 K/UL (ref 1–4.8)
LYMPHOCYTES NFR BLD: 23 % (ref 18–48)
MCH RBC QN AUTO: 29.9 PG (ref 27–31)
MCHC RBC AUTO-ENTMCNC: 33.9 G/DL (ref 32–36)
MCV RBC AUTO: 88 FL (ref 82–98)
MONOCYTES # BLD AUTO: 0.8 K/UL (ref 0.3–1)
MONOCYTES NFR BLD: 10.7 % (ref 4–15)
NEUTROPHILS # BLD AUTO: 4.6 K/UL (ref 1.8–7.7)
NEUTROPHILS NFR BLD: 63.4 % (ref 38–73)
NONHDLC SERPL-MCNC: 138 MG/DL
NRBC BLD-RTO: 0 /100 WBC
PLATELET # BLD AUTO: 336 K/UL (ref 150–450)
PMV BLD AUTO: 9.8 FL (ref 9.2–12.9)
POTASSIUM SERPL-SCNC: 3.9 MMOL/L (ref 3.5–5.1)
PROT SERPL-MCNC: 7.1 G/DL (ref 6–8.4)
RBC # BLD AUTO: 5.19 M/UL (ref 4.6–6.2)
SODIUM SERPL-SCNC: 139 MMOL/L (ref 136–145)
TRIGL SERPL-MCNC: 189 MG/DL (ref 30–150)
WBC # BLD AUTO: 7.22 K/UL (ref 3.9–12.7)

## 2022-08-30 PROCEDURE — 3288F PR FALLS RISK ASSESSMENT DOCUMENTED: ICD-10-PCS | Mod: CPTII,S$GLB,, | Performed by: NURSE PRACTITIONER

## 2022-08-30 PROCEDURE — 3288F FALL RISK ASSESSMENT DOCD: CPT | Mod: CPTII,S$GLB,, | Performed by: NURSE PRACTITIONER

## 2022-08-30 PROCEDURE — 4010F PR ACE/ARB THEARPY RXD/TAKEN: ICD-10-PCS | Mod: CPTII,S$GLB,, | Performed by: NURSE PRACTITIONER

## 2022-08-30 PROCEDURE — 80061 LIPID PANEL: CPT | Performed by: INTERNAL MEDICINE

## 2022-08-30 PROCEDURE — 3079F DIAST BP 80-89 MM HG: CPT | Mod: CPTII,S$GLB,, | Performed by: NURSE PRACTITIONER

## 2022-08-30 PROCEDURE — 1125F AMNT PAIN NOTED PAIN PRSNT: CPT | Mod: CPTII,S$GLB,, | Performed by: NURSE PRACTITIONER

## 2022-08-30 PROCEDURE — 4010F ACE/ARB THERAPY RXD/TAKEN: CPT | Mod: CPTII,S$GLB,, | Performed by: NURSE PRACTITIONER

## 2022-08-30 PROCEDURE — 3074F PR MOST RECENT SYSTOLIC BLOOD PRESSURE < 130 MM HG: ICD-10-PCS | Mod: CPTII,S$GLB,, | Performed by: NURSE PRACTITIONER

## 2022-08-30 PROCEDURE — 3008F PR BODY MASS INDEX (BMI) DOCUMENTED: ICD-10-PCS | Mod: CPTII,S$GLB,, | Performed by: NURSE PRACTITIONER

## 2022-08-30 PROCEDURE — 99999 PR PBB SHADOW E&M-EST. PATIENT-LVL IV: CPT | Mod: PBBFAC,,, | Performed by: NURSE PRACTITIONER

## 2022-08-30 PROCEDURE — 1101F PT FALLS ASSESS-DOCD LE1/YR: CPT | Mod: CPTII,S$GLB,, | Performed by: NURSE PRACTITIONER

## 2022-08-30 PROCEDURE — 3008F BODY MASS INDEX DOCD: CPT | Mod: CPTII,S$GLB,, | Performed by: NURSE PRACTITIONER

## 2022-08-30 PROCEDURE — 3074F SYST BP LT 130 MM HG: CPT | Mod: CPTII,S$GLB,, | Performed by: NURSE PRACTITIONER

## 2022-08-30 PROCEDURE — 1125F PR PAIN SEVERITY QUANTIFIED, PAIN PRESENT: ICD-10-PCS | Mod: CPTII,S$GLB,, | Performed by: NURSE PRACTITIONER

## 2022-08-30 PROCEDURE — 1160F RVW MEDS BY RX/DR IN RCRD: CPT | Mod: CPTII,S$GLB,, | Performed by: NURSE PRACTITIONER

## 2022-08-30 PROCEDURE — 84153 ASSAY OF PSA TOTAL: CPT | Performed by: INTERNAL MEDICINE

## 2022-08-30 PROCEDURE — 1159F MED LIST DOCD IN RCRD: CPT | Mod: CPTII,S$GLB,, | Performed by: NURSE PRACTITIONER

## 2022-08-30 PROCEDURE — 36415 COLL VENOUS BLD VENIPUNCTURE: CPT | Mod: PO | Performed by: INTERNAL MEDICINE

## 2022-08-30 PROCEDURE — 80053 COMPREHEN METABOLIC PANEL: CPT | Performed by: INTERNAL MEDICINE

## 2022-08-30 PROCEDURE — 1159F PR MEDICATION LIST DOCUMENTED IN MEDICAL RECORD: ICD-10-PCS | Mod: CPTII,S$GLB,, | Performed by: NURSE PRACTITIONER

## 2022-08-30 PROCEDURE — 99214 PR OFFICE/OUTPT VISIT, EST, LEVL IV, 30-39 MIN: ICD-10-PCS | Mod: S$GLB,,, | Performed by: NURSE PRACTITIONER

## 2022-08-30 PROCEDURE — 1160F PR REVIEW ALL MEDS BY PRESCRIBER/CLIN PHARMACIST DOCUMENTED: ICD-10-PCS | Mod: CPTII,S$GLB,, | Performed by: NURSE PRACTITIONER

## 2022-08-30 PROCEDURE — 3079F PR MOST RECENT DIASTOLIC BLOOD PRESSURE 80-89 MM HG: ICD-10-PCS | Mod: CPTII,S$GLB,, | Performed by: NURSE PRACTITIONER

## 2022-08-30 PROCEDURE — 85025 COMPLETE CBC W/AUTO DIFF WBC: CPT | Performed by: INTERNAL MEDICINE

## 2022-08-30 PROCEDURE — 99214 OFFICE O/P EST MOD 30 MIN: CPT | Mod: S$GLB,,, | Performed by: NURSE PRACTITIONER

## 2022-08-30 PROCEDURE — 1101F PR PT FALLS ASSESS DOC 0-1 FALLS W/OUT INJ PAST YR: ICD-10-PCS | Mod: CPTII,S$GLB,, | Performed by: NURSE PRACTITIONER

## 2022-08-30 PROCEDURE — 99999 PR PBB SHADOW E&M-EST. PATIENT-LVL IV: ICD-10-PCS | Mod: PBBFAC,,, | Performed by: NURSE PRACTITIONER

## 2022-08-30 RX ORDER — MELOXICAM 15 MG/1
15 TABLET ORAL DAILY
Qty: 30 TABLET | Refills: 0 | Status: SHIPPED | OUTPATIENT
Start: 2022-08-30 | End: 2022-12-29

## 2022-08-30 NOTE — PROGRESS NOTES
"Subjective:       Patient ID: Byron Styles is a 67 y.o. male.    Chief Complaint: Hip Pain (Pt c/o left hip pain that radiates to his foot)    Pt presents to clinic today for right hip pain  Woke up one morning about 10 days ago in pain  No known injury or fall  Feels it goes down the right leg and has some mild numbness/tingling in the right foot  Has tried tylenol with little relief    Has chronic left knee pain and arthritis  Seeing ortho for this and possibly considering knee replacement  Takes mobic prn but hasn't taken in a while    BP good in clinic today  Due for labs- scheduled for today      /84   Pulse 74   Temp 98.4 °F (36.9 °C) (Temporal)   Ht 6' 1" (1.854 m)   Wt 110.5 kg (243 lb 9.7 oz)   SpO2 95%   BMI 32.14 kg/m²     Review of Systems   Constitutional:  Negative for activity change, appetite change, chills, diaphoresis, fatigue, fever and unexpected weight change.   HENT:  Negative for congestion, ear pain, nosebleeds, postnasal drip, rhinorrhea, sinus pressure, sneezing, sore throat and trouble swallowing.    Eyes:  Negative for photophobia, pain and visual disturbance.   Respiratory:  Negative for apnea, cough, choking, chest tightness, shortness of breath and wheezing.    Cardiovascular:  Negative for chest pain, palpitations and leg swelling.   Gastrointestinal:  Negative for abdominal pain, blood in stool, constipation, diarrhea, nausea and vomiting.   Genitourinary:  Negative for decreased urine volume, difficulty urinating, dysuria, enuresis, flank pain, frequency, genital sores, hematuria, penile discharge, penile pain, penile swelling, scrotal swelling, testicular pain and urgency.   Musculoskeletal:  Positive for arthralgias, back pain and myalgias. Negative for gait problem and joint swelling.   Skin:  Negative for color change, pallor, rash and wound.   Neurological:  Negative for dizziness, tremors, seizures, syncope, weakness, light-headedness, numbness and headaches. "   Psychiatric/Behavioral:  Negative for agitation, confusion, decreased concentration, hallucinations and sleep disturbance. The patient is not nervous/anxious.      Objective:      Physical Exam  Vitals and nursing note reviewed.   Constitutional:       General: He is not in acute distress.     Appearance: He is well-developed. He is not diaphoretic.   HENT:      Head: Normocephalic and atraumatic.   Eyes:      General:         Right eye: No discharge.         Left eye: No discharge.      Conjunctiva/sclera: Conjunctivae normal.   Cardiovascular:      Rate and Rhythm: Normal rate and regular rhythm.      Heart sounds: Normal heart sounds. No murmur heard.  Pulmonary:      Effort: Pulmonary effort is normal. No respiratory distress.      Breath sounds: Normal breath sounds. No wheezing or rales.   Chest:      Chest wall: No tenderness.   Abdominal:      General: There is no distension.      Palpations: Abdomen is soft.   Musculoskeletal:      Right hip: No bony tenderness. Decreased range of motion. Normal strength.      Right knee: Decreased range of motion.      Left knee: Decreased range of motion.   Skin:     General: Skin is warm and dry.      Findings: No rash.   Neurological:      Mental Status: He is alert and oriented to person, place, and time.   Psychiatric:         Behavior: Behavior normal. Behavior is cooperative.         Thought Content: Thought content normal.         Judgment: Judgment normal.       Assessment:       1. Right hip pain    2. Arthritis of right knee    3. Essential hypertension    4. BMI 32.0-32.9,adult        Plan:       Byron was seen today for hip pain.    Diagnoses and all orders for this visit:    Right hip pain  -     meloxicam (MOBIC) 15 MG tablet; Take 1 tablet (15 mg total) by mouth once daily.  - Offered xray and pt but will hold off for now. Will do trial of mobic and gabapentin to see if he gets any relief in the pain and follow up as needed    Arthritis of right knee  -      meloxicam (MOBIC) 15 MG tablet; Take 1 tablet (15 mg total) by mouth once daily.  - Chronic, stable- continue mobic prn and following with ortho    Essential hypertension        - Chronic, stable- continue meds as prescribed  BMI 32.0-32.9,adult    Follow up for worsening or no improvement in symptoms and PRN.

## 2022-09-28 ENCOUNTER — PATIENT MESSAGE (OUTPATIENT)
Dept: INTERNAL MEDICINE | Facility: CLINIC | Age: 67
End: 2022-09-28
Payer: COMMERCIAL

## 2022-11-17 DIAGNOSIS — M17.0 PRIMARY OSTEOARTHRITIS OF BOTH KNEES: Primary | ICD-10-CM

## 2022-11-28 ENCOUNTER — OFFICE VISIT (OUTPATIENT)
Dept: ORTHOPEDICS | Facility: CLINIC | Age: 67
End: 2022-11-28
Payer: COMMERCIAL

## 2022-11-28 ENCOUNTER — HOSPITAL ENCOUNTER (OUTPATIENT)
Dept: RADIOLOGY | Facility: HOSPITAL | Age: 67
Discharge: HOME | End: 2022-11-28
Attending: ORTHOPAEDIC SURGERY
Payer: COMMERCIAL

## 2022-11-28 VITALS — WEIGHT: 246.94 LBS | HEIGHT: 73 IN | BODY MASS INDEX: 32.73 KG/M2

## 2022-11-28 DIAGNOSIS — M17.12 ARTHRITIS OF KNEE, LEFT: ICD-10-CM

## 2022-11-28 DIAGNOSIS — M21.161 ACQUIRED VARUS DEFORMITY KNEE, RIGHT: ICD-10-CM

## 2022-11-28 DIAGNOSIS — M17.0 PRIMARY OSTEOARTHRITIS OF BOTH KNEES: ICD-10-CM

## 2022-11-28 DIAGNOSIS — M17.11 ARTHRITIS OF KNEE, RIGHT: Primary | ICD-10-CM

## 2022-11-28 DIAGNOSIS — M21.162 ACQUIRED VARUS DEFORMITY KNEE, LEFT: ICD-10-CM

## 2022-11-28 PROCEDURE — 1159F PR MEDICATION LIST DOCUMENTED IN MEDICAL RECORD: ICD-10-PCS | Mod: CPTII,S$GLB,, | Performed by: ORTHOPAEDIC SURGERY

## 2022-11-28 PROCEDURE — 1101F PT FALLS ASSESS-DOCD LE1/YR: CPT | Mod: CPTII,S$GLB,, | Performed by: ORTHOPAEDIC SURGERY

## 2022-11-28 PROCEDURE — 1160F PR REVIEW ALL MEDS BY PRESCRIBER/CLIN PHARMACIST DOCUMENTED: ICD-10-PCS | Mod: CPTII,S$GLB,, | Performed by: ORTHOPAEDIC SURGERY

## 2022-11-28 PROCEDURE — 99999 PR PBB SHADOW E&M-EST. PATIENT-LVL III: ICD-10-PCS | Mod: PBBFAC,,, | Performed by: ORTHOPAEDIC SURGERY

## 2022-11-28 PROCEDURE — 99214 PR OFFICE/OUTPT VISIT, EST, LEVL IV, 30-39 MIN: ICD-10-PCS | Mod: S$GLB,,, | Performed by: ORTHOPAEDIC SURGERY

## 2022-11-28 PROCEDURE — 99999 PR PBB SHADOW E&M-EST. PATIENT-LVL III: CPT | Mod: PBBFAC,,, | Performed by: ORTHOPAEDIC SURGERY

## 2022-11-28 PROCEDURE — 1160F RVW MEDS BY RX/DR IN RCRD: CPT | Mod: CPTII,S$GLB,, | Performed by: ORTHOPAEDIC SURGERY

## 2022-11-28 PROCEDURE — 99214 OFFICE O/P EST MOD 30 MIN: CPT | Mod: S$GLB,,, | Performed by: ORTHOPAEDIC SURGERY

## 2022-11-28 PROCEDURE — 73564 XR KNEE ORTHO BILAT WITH FLEXION: ICD-10-PCS | Mod: 26,,, | Performed by: RADIOLOGY

## 2022-11-28 PROCEDURE — 3288F FALL RISK ASSESSMENT DOCD: CPT | Mod: CPTII,S$GLB,, | Performed by: ORTHOPAEDIC SURGERY

## 2022-11-28 PROCEDURE — 3008F PR BODY MASS INDEX (BMI) DOCUMENTED: ICD-10-PCS | Mod: CPTII,S$GLB,, | Performed by: ORTHOPAEDIC SURGERY

## 2022-11-28 PROCEDURE — 1125F PR PAIN SEVERITY QUANTIFIED, PAIN PRESENT: ICD-10-PCS | Mod: CPTII,S$GLB,, | Performed by: ORTHOPAEDIC SURGERY

## 2022-11-28 PROCEDURE — 1159F MED LIST DOCD IN RCRD: CPT | Mod: CPTII,S$GLB,, | Performed by: ORTHOPAEDIC SURGERY

## 2022-11-28 PROCEDURE — 3008F BODY MASS INDEX DOCD: CPT | Mod: CPTII,S$GLB,, | Performed by: ORTHOPAEDIC SURGERY

## 2022-11-28 PROCEDURE — 1125F AMNT PAIN NOTED PAIN PRSNT: CPT | Mod: CPTII,S$GLB,, | Performed by: ORTHOPAEDIC SURGERY

## 2022-11-28 PROCEDURE — 73564 X-RAY EXAM KNEE 4 OR MORE: CPT | Mod: TC,50

## 2022-11-28 PROCEDURE — 73564 X-RAY EXAM KNEE 4 OR MORE: CPT | Mod: 26,,, | Performed by: RADIOLOGY

## 2022-11-28 PROCEDURE — 3288F PR FALLS RISK ASSESSMENT DOCUMENTED: ICD-10-PCS | Mod: CPTII,S$GLB,, | Performed by: ORTHOPAEDIC SURGERY

## 2022-11-28 PROCEDURE — 4010F ACE/ARB THERAPY RXD/TAKEN: CPT | Mod: CPTII,S$GLB,, | Performed by: ORTHOPAEDIC SURGERY

## 2022-11-28 PROCEDURE — 1101F PR PT FALLS ASSESS DOC 0-1 FALLS W/OUT INJ PAST YR: ICD-10-PCS | Mod: CPTII,S$GLB,, | Performed by: ORTHOPAEDIC SURGERY

## 2022-11-28 PROCEDURE — 4010F PR ACE/ARB THEARPY RXD/TAKEN: ICD-10-PCS | Mod: CPTII,S$GLB,, | Performed by: ORTHOPAEDIC SURGERY

## 2022-11-28 NOTE — PROGRESS NOTES
Subjective:     Patient ID: Byron Styles is a 67 y.o. male.    Chief Complaint: Pain of the Right Knee and Pain of the Left Knee   10/08/2020  HPI:  65-year-old male who states been having pain in both of his knees for almost 6 years.  Denies any history of trauma.  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  He occasionally takes Aleve may be 3 times a week for a foot problem that he just developed and seen Dr. Gr.  He does have history of gout .  Also had some numbness and neuropathy in his feet recently started by Podiatry on Neurontin 100 mg and that seems to ease things up for him.  Makes him a little bit sleepy and he did not want increase the dose.   pain in the knees are 4/10 worst with activities.  He does give history of Raynaud disease sometimes periphery gets severely cold.  Denies any treatment in the legs in the form of any injections or treatment for arthritis.  Did have an ultrasound to the legs which was negative for DVT recently and that because his feet swell up on him.  Denies any fever or chills or shortness of breath or difficulty with chewing swallowing loss of bowel or bladder control or blurry vision or double vision or chest pain    11/19/2020  Patient stated the meloxicam seems to help helped significantly and not having any pain in his knees anymore.  He is doing really well at this point in time.  He will be finishing taking his gabapentin for his primary care by the end of this prescription since his feet are doing better.  Overall he is doing well.  He is still remaining active at work.  Denies any fever or chills or shortness of breath or difficulty with chewing or swallowing loss of bowel bladder control or blurry vision double vision or loss of sense smell taste  His pain level is 0/10 today and states the meloxicam worked great.  Blood pressure 116/74, pulse 73    12/02/2021  Bilateral knee arthritis.  He was doing  really well on the meloxicam and doing some independent exercises.  He was taken off by primary care because being on anti-inflammatories for long period of time could cause gastrointestinal, liver and kidney disease.  I did tell him taking it twice or 3 times a week should not be a big issue.  We can follow this with blood work every 6 months.  His pain is around 7/10 he has tries to take Tylenol for it.  He does take his medication for gout and his uric acid last evaluation was down to normal at 5.4.  He does ambulate without any assistive devices.  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  He does dabbed with [employer] work and would working on his own.  He does ambulate without any assistive devices.  His pain is 7/10  No fever no chills no shortness of breath or difficulty with chewing or swallowing loss of bowel bladder control      02/10/2022  Bilateral knee severe arthritis and varus deformity.  We gave him injections of 80 mg Depo-Medrol in each knee on 12/02/2021 with great relief of his pain.  Now he is taking meloxicam maybe twice a week occasional Tylenol.  He is ambulating without any assistive devices.  We did get him approved for viscosupplementation/Synvisc-One but at this time since he is doing well we will hold off.  I described Synvisc-One and viscosupplementation to him and I went over his x-rays again with him today.  Long discussion today about total knee replacement and he wanted to ask me all these questions and I brought in the model showed him x-ray about people have it and how it is done and wise it outpatient surgery at we spent quite a bit of time going over the surgery itself.  He is not there at this point he is doing really well his pain level is 1/10.  He wants to avoid surgery right now since he is doing better.    11/28/2022   Bilateral knee arthritis.  Patient states his pain is 3/10 sitting down however when he is very active it goes up to around 6.  He got approved right  now to receive viscosupplementation Synvisc-One.  He is maintaining very active life and he is working full-time right now.  He anticipates to work 5 more years because he feels healthy only taking blood pressure medications.  His knee are hurting him the swell up once in a while.  He does take meloxicam occasionally and Tylenol occasionally.  He wants to start taking baby aspirin and we discussed that in with him.    No fever no chills no shortness of breath or difficulty with chewing swallowing loss of bowel bladder control blurry vision double vision loss sense smell or taste    Past Medical History:   Diagnosis Date    Colon polyps 2009    Depression     Hyperlipidemia     Hypertension     Localized osteoarthrosis not specified whether primary or secondary, lower leg 4/6/2015    Raynaud's disease     Skin cancer of arm, left 05/2020    [facility]     Past Surgical History:   Procedure Laterality Date    COLONOSCOPY W/ POLYPECTOMY  2009    MOUTH SURGERY      WISDOM TOOTH EXTRACTION       Family History   Problem Relation Age of Onset    Heart disease Father 65        CABG    Cancer Father         Bladder    Heart disease Brother 68        CABG    Depression Mother     Heart disease Mother         CHF     Social History     Socioeconomic History    Marital status: Single    Number of children: 0   Occupational History    Occupation:      Employer: [employer]   Tobacco Use    Smoking status: Never    Smokeless tobacco: Current     Types: Chew   Substance and Sexual Activity    Alcohol use: Yes     Comment: social/ occassional    Drug use: No    Sexual activity: Not Currently     Medication List with Changes/Refills   Current Medications    ASPIRIN (ECOTRIN) 81 MG EC TABLET    Take 81 mg by mouth once daily.    ATORVASTATIN (LIPITOR) 40 MG TABLET    TAKE 1 TABLET BY MOUTH ONCE DAILY IN THE EVENING    FEBUXOSTAT (ULORIC) 40 MG TAB    Take 1 tablet by mouth once  daily    LISINOPRIL-HYDROCHLOROTHIAZIDE (PRINZIDE,ZESTORETIC) 20-12.5 MG PER TABLET    Take 1 tablet by mouth once daily    MULTIVITAMIN CAPSULE    Take 1 capsule by mouth once daily.    NADOLOL (CORGARD) 20 MG TABLET    Take 1 tablet by mouth once daily    SERTRALINE (ZOLOFT) 100 MG TABLET    Take 1 tablet (100 mg total) by mouth once daily.   Discontinued Medications    GABAPENTIN (NEURONTIN) 100 MG CAPSULE    Take 1 capsule (100 mg total) by mouth 2 (two) times daily.     Review of patient's allergies indicates:  No Known Allergies  Review of Systems   Constitutional: Negative for decreased appetite.   HENT:  Negative for tinnitus.    Eyes:  Negative for double vision.   Cardiovascular:  Negative for chest pain.   Respiratory:  Negative for wheezing.    Hematologic/Lymphatic: Negative for bleeding problem.   Skin:  Negative for dry skin.   Musculoskeletal:  Positive for gout, joint pain and stiffness. Negative for arthritis, back pain and neck pain.   Gastrointestinal:  Negative for abdominal pain.   Genitourinary:  Negative for bladder incontinence.   Neurological:  Negative for numbness, paresthesias and sensory change.   Psychiatric/Behavioral:  Negative for altered mental status.      Objective:   Body mass index is 32.58 kg/m².  There were no vitals filed for this visit.       General    Constitutional: He is oriented to person, place, and time. He appears well-developed.   HENT:   Head: Atraumatic.   Eyes: EOM are normal.   Cardiovascular:  Normal rate.            Pulmonary/Chest: Effort normal.   Neurological: He is alert and oriented to person, place, and time.   Psychiatric: Judgment normal.          Cervical rotation is very functional  Ambulate with very mild limp  Bilateral upper extremity neurovascularly intact.  Very faint radial and ulnar pulses.  Greater than 3 sec capillary refill in the fingers due to Raynaud's phenomena.  Strength is 5/5 throughout motor groups  Lumbar without pain  Pelvis is  level  Bilateral hips passive motion no pain no pain to palpation over the trochanters  Hip flexors, abductors, adductors, quads, hamstrings, ankle extensors and flexors inverters and everters all 5/5  Bilateral knee with varus deformity.  He does have moderate tenderness medial  joint and crepitus to compression on the patella.  Range of motion he has 5° of flexion contractures and flexes 120°.  Collaterals and cruciates are stable.  Mild to moderate swelling.  Questionable Lola sign  Calves are soft nontender  Minimal varicosities  Ankle motion intact  DP 1+  Skin is warm to touch no obvious lesions    Relevant imaging results reviewed and interpreted by me, discussed with the patient and / or family today     X-ray 11/28/2022 bilateral knees with complete loss of medial joint space with marginal osteophytic changes and varus deformity.  Consistent with severe arthritis of the knee  X-ray 12/02/2021 bilateral knees with complete loss of medial joint space with marginal osteophytes consistent of bilateral knees arthrosis with varus deformity  X-ray 09/10/2020 bilateral knees showing medial joint narrowing almost 80% with osteophytic changes and cystic degeneration consistent with bilateral knee arthrosis  Assessment:     Encounter Diagnoses   Name Primary?    Arthritis of knee, right Yes    Acquired varus deformity knee, right     Arthritis of knee, left     Acquired varus deformity knee, left         Plan:   Arthritis of knee, right    Acquired varus deformity knee, right    Arthritis of knee, left    Acquired varus deformity knee, left       Patient Instructions   We injected both of her knees with rooster comb gel/Synvisc-One 11/28/2022   Ice the knees because they might swell up the next few days and hurt her more   Continue taking the meloxicam 15 mg on as needed basis  Continue also Tylenol 650 mg maximum 3 times a day if needed  These injections might take around 8 weeks for them to see the effects and  if they work you can have them repeated once every 6 months   I will have you see my assistant/PA in 8 weeks for re-evaluation and see how you doing    Do not take Aleve or ibuprofen or Motrin over-the-counter with the meloxicam they all are in the same family you will end up with a ulcer or kidney failure    You still could take baby aspirin every day with the meloxicam      Kellgren Dick scale 3. Both knees    Patient had a lot of questions about total knee replacement and how it is performed and what is the outcomes and all of that with discussed in details with him.  We showed him on the model we showed him x-rays that have total knee replacement.  I discussed how I control the pain with him and how why inject the inside of the knee and do my own blocks to allow discharge the same day and decrease pain.  I did tell him also that all of the work depends on the patient doing the therapy and range of motion otherwise I will stiffen up on them.  I did showing x-rays showed him on the model described how it is done.    Disclaimer: This note was prepared using a voice recognition system and is likely to have sound alike errors within the text.

## 2022-11-28 NOTE — PATIENT INSTRUCTIONS
We injected both of her knees with rooster comb gel/Synvisc-One 11/28/2022   Ice the knees because they might swell up the next few days and hurt her more   Continue taking the meloxicam 15 mg on as needed basis  Continue also Tylenol 650 mg maximum 3 times a day if needed  These injections might take around 8 weeks for them to see the effects and if they work you can have them repeated once every 6 months   I will have you see my assistant/PA in 8 weeks for re-evaluation and see how you doing    Do not take Aleve or ibuprofen or Motrin over-the-counter with the meloxicam they all are in the same family you will end up with a ulcer or kidney failure    You still could take baby aspirin every day with the meloxicam

## 2023-01-27 ENCOUNTER — OFFICE VISIT (OUTPATIENT)
Dept: ORTHOPEDICS | Facility: CLINIC | Age: 68
End: 2023-01-27
Payer: COMMERCIAL

## 2023-01-27 VITALS
HEART RATE: 78 BPM | DIASTOLIC BLOOD PRESSURE: 72 MMHG | WEIGHT: 243.5 LBS | BODY MASS INDEX: 32.27 KG/M2 | RESPIRATION RATE: 18 BRPM | HEIGHT: 73 IN | SYSTOLIC BLOOD PRESSURE: 103 MMHG

## 2023-01-27 DIAGNOSIS — M17.0 PRIMARY OSTEOARTHRITIS OF BOTH KNEES: Primary | ICD-10-CM

## 2023-01-27 DIAGNOSIS — M21.162 ACQUIRED VARUS DEFORMITY KNEE, LEFT: ICD-10-CM

## 2023-01-27 DIAGNOSIS — M21.161 ACQUIRED VARUS DEFORMITY KNEE, RIGHT: ICD-10-CM

## 2023-01-27 PROCEDURE — 1159F PR MEDICATION LIST DOCUMENTED IN MEDICAL RECORD: ICD-10-PCS | Mod: CPTII,S$GLB,, | Performed by: PHYSICIAN ASSISTANT

## 2023-01-27 PROCEDURE — 1125F PR PAIN SEVERITY QUANTIFIED, PAIN PRESENT: ICD-10-PCS | Mod: CPTII,S$GLB,, | Performed by: PHYSICIAN ASSISTANT

## 2023-01-27 PROCEDURE — 3078F PR MOST RECENT DIASTOLIC BLOOD PRESSURE < 80 MM HG: ICD-10-PCS | Mod: CPTII,S$GLB,, | Performed by: PHYSICIAN ASSISTANT

## 2023-01-27 PROCEDURE — 1101F PR PT FALLS ASSESS DOC 0-1 FALLS W/OUT INJ PAST YR: ICD-10-PCS | Mod: CPTII,S$GLB,, | Performed by: PHYSICIAN ASSISTANT

## 2023-01-27 PROCEDURE — 3008F PR BODY MASS INDEX (BMI) DOCUMENTED: ICD-10-PCS | Mod: CPTII,S$GLB,, | Performed by: PHYSICIAN ASSISTANT

## 2023-01-27 PROCEDURE — 3288F FALL RISK ASSESSMENT DOCD: CPT | Mod: CPTII,S$GLB,, | Performed by: PHYSICIAN ASSISTANT

## 2023-01-27 PROCEDURE — 99999 PR PBB SHADOW E&M-EST. PATIENT-LVL III: ICD-10-PCS | Mod: PBBFAC,,, | Performed by: PHYSICIAN ASSISTANT

## 2023-01-27 PROCEDURE — 1101F PT FALLS ASSESS-DOCD LE1/YR: CPT | Mod: CPTII,S$GLB,, | Performed by: PHYSICIAN ASSISTANT

## 2023-01-27 PROCEDURE — 99999 PR PBB SHADOW E&M-EST. PATIENT-LVL III: CPT | Mod: PBBFAC,,, | Performed by: PHYSICIAN ASSISTANT

## 2023-01-27 PROCEDURE — 1125F AMNT PAIN NOTED PAIN PRSNT: CPT | Mod: CPTII,S$GLB,, | Performed by: PHYSICIAN ASSISTANT

## 2023-01-27 PROCEDURE — 99214 PR OFFICE/OUTPT VISIT, EST, LEVL IV, 30-39 MIN: ICD-10-PCS | Mod: S$GLB,,, | Performed by: PHYSICIAN ASSISTANT

## 2023-01-27 PROCEDURE — 3008F BODY MASS INDEX DOCD: CPT | Mod: CPTII,S$GLB,, | Performed by: PHYSICIAN ASSISTANT

## 2023-01-27 PROCEDURE — 1160F RVW MEDS BY RX/DR IN RCRD: CPT | Mod: CPTII,S$GLB,, | Performed by: PHYSICIAN ASSISTANT

## 2023-01-27 PROCEDURE — 1159F MED LIST DOCD IN RCRD: CPT | Mod: CPTII,S$GLB,, | Performed by: PHYSICIAN ASSISTANT

## 2023-01-27 PROCEDURE — 99214 OFFICE O/P EST MOD 30 MIN: CPT | Mod: S$GLB,,, | Performed by: PHYSICIAN ASSISTANT

## 2023-01-27 PROCEDURE — 3288F PR FALLS RISK ASSESSMENT DOCUMENTED: ICD-10-PCS | Mod: CPTII,S$GLB,, | Performed by: PHYSICIAN ASSISTANT

## 2023-01-27 PROCEDURE — 3074F PR MOST RECENT SYSTOLIC BLOOD PRESSURE < 130 MM HG: ICD-10-PCS | Mod: CPTII,S$GLB,, | Performed by: PHYSICIAN ASSISTANT

## 2023-01-27 PROCEDURE — 1160F PR REVIEW ALL MEDS BY PRESCRIBER/CLIN PHARMACIST DOCUMENTED: ICD-10-PCS | Mod: CPTII,S$GLB,, | Performed by: PHYSICIAN ASSISTANT

## 2023-01-27 PROCEDURE — 3074F SYST BP LT 130 MM HG: CPT | Mod: CPTII,S$GLB,, | Performed by: PHYSICIAN ASSISTANT

## 2023-01-27 PROCEDURE — 3078F DIAST BP <80 MM HG: CPT | Mod: CPTII,S$GLB,, | Performed by: PHYSICIAN ASSISTANT

## 2023-01-27 NOTE — PROGRESS NOTES
Subjective:     Patient ID: Byron Styles is a 67 y.o. male.    Chief Complaint: Pain of the Left Knee and Pain of the Right Knee     10/08/2020  HPI:  65-year-old male who states been having pain in both of his knees for almost 6 years.  Denies any history of trauma.  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  He occasionally takes Aleve may be 3 times a week for a foot problem that he just developed and seen Dr. Gr.  He does have history of gout .  Also had some numbness and neuropathy in his feet recently started by Podiatry on Neurontin 100 mg and that seems to ease things up for him.  Makes him a little bit sleepy and he did not want increase the dose.   pain in the knees are 4/10 worst with activities.  He does give history of Raynaud disease sometimes periphery gets severely cold.  Denies any treatment in the legs in the form of any injections or treatment for arthritis.  Did have an ultrasound to the legs which was negative for DVT recently and that because his feet swell up on him.  Denies any fever or chills or shortness of breath or difficulty with chewing swallowing loss of bowel or bladder control or blurry vision or double vision or chest pain    11/19/2020  Patient stated the meloxicam seems to help helped significantly and not having any pain in his knees anymore.  He is doing really well at this point in time.  He will be finishing taking his gabapentin for his primary care by the end of this prescription since his feet are doing better.  Overall he is doing well.  He is still remaining active at work.  Denies any fever or chills or shortness of breath or difficulty with chewing or swallowing loss of bowel bladder control or blurry vision double vision or loss of sense smell taste  His pain level is 0/10 today and states the meloxicam worked great.  Blood pressure 116/74, pulse 73    12/02/2021  Bilateral knee arthritis.  He was  doing really well on the meloxicam and doing some independent exercises.  He was taken off by primary care because being on anti-inflammatories for long period of time could cause gastrointestinal, liver and kidney disease.  I did tell him taking it twice or 3 times a week should not be a big issue.  We can follow this with blood work every 6 months.  His pain is around 7/10 he has tries to take Tylenol for it.  He does take his medication for gout and his uric acid last evaluation was down to normal at 5.4.  He does ambulate without any assistive devices.  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  He does dabbed with [employer] work and would working on his own.  He does ambulate without any assistive devices.  His pain is 7/10  No fever no chills no shortness of breath or difficulty with chewing or swallowing loss of bowel bladder control      02/10/2022  Bilateral knee severe arthritis and varus deformity.  We gave him injections of 80 mg Depo-Medrol in each knee on 12/02/2021 with great relief of his pain.  Now he is taking meloxicam maybe twice a week occasional Tylenol.  He is ambulating without any assistive devices.  We did get him approved for viscosupplementation/Synvisc-One but at this time since he is doing well we will hold off.  I described Synvisc-One and viscosupplementation to him and I went over his x-rays again with him today.  Long discussion today about total knee replacement and he wanted to ask me all these questions and I brought in the model showed him x-ray about people have it and how it is done and wise it outpatient surgery at we spent quite a bit of time going over the surgery itself.  He is not there at this point he is doing really well his pain level is 1/10.  He wants to avoid surgery right now since he is doing better.    11/28/2022   Bilateral knee arthritis.  Patient states his pain is 3/10 sitting down however when he is very active it goes up to around 6.  He got approved  right now to receive viscosupplementation Synvisc-One.  He is maintaining very active life and he is working full-time right now.  He anticipates to work 5 more years because he feels healthy only taking blood pressure medications.  His knee are hurting him the swell up once in a while.  He does take meloxicam occasionally and Tylenol occasionally.  He wants to start taking baby aspirin and we discussed that in with him.    No fever no chills no shortness of breath or difficulty with chewing swallowing loss of bowel bladder control blurry vision double vision loss sense smell or taste    1/27/2023  Patient presents today with a chief complaint of bilateral knee pain.  Patient states pain is at worst 2/10 affecting activity of daily living and thus his quality of life.  The patient last received bilateral Gel injections 11/28/2022.  He states he has not noticed any difference or improvement to the knee.  He states today the right knee is hurting him more than the left although they take turns.  Additionally he states he is not been using his Mobic daily in fact it has been roughly 2 months since he is taking a pill.  He also states he has Voltaren gel at home which he does not use.    The patient likes to remain active in his day-to-day and notices intermittent swelling.  He would like to do everything to hold off surgery as he would like to be retired before having had the procedures done.    Patient is presently denying any shortness of breath, chest pain, fever/chills, nausea/vomiting, loss of taste or smell, numbness/tingling or sensation changes, loss of bladder or bowel function, loss of taste/smell.       Past Medical History:   Diagnosis Date    Colon polyps 2009    Depression     Hyperlipidemia     Hypertension     Localized osteoarthrosis not specified whether primary or secondary, lower leg 4/6/2015    Raynaud's disease     Skin cancer of arm, left 05/2020    [facility]     Past Surgical History:    Procedure Laterality Date    COLONOSCOPY W/ POLYPECTOMY  2009    MOUTH SURGERY      WISDOM TOOTH EXTRACTION       Family History   Problem Relation Age of Onset    Heart disease Father 65        CABG    Cancer Father         Bladder    Heart disease Brother 68        CABG    Depression Mother     Heart disease Mother         CHF     Social History     Socioeconomic History    Marital status: Single    Number of children: 0   Occupational History    Occupation:      Employer: [employer]   Tobacco Use    Smoking status: Never    Smokeless tobacco: Current     Types: Chew   Substance and Sexual Activity    Alcohol use: Yes     Comment: social/ occassional    Drug use: No    Sexual activity: Not Currently     Medication List with Changes/Refills   Current Medications    ASPIRIN (ECOTRIN) 81 MG EC TABLET    Take 81 mg by mouth once daily.    ATORVASTATIN (LIPITOR) 40 MG TABLET    TAKE 1 TABLET BY MOUTH ONCE DAILY IN THE EVENING    FEBUXOSTAT (ULORIC) 40 MG TAB    Take 1 tablet by mouth once daily    LISINOPRIL-HYDROCHLOROTHIAZIDE (PRINZIDE,ZESTORETIC) 20-12.5 MG PER TABLET    Take 1 tablet by mouth once daily    MELOXICAM (MOBIC) 15 MG TABLET    Take 1 tablet by mouth once daily    MULTIVITAMIN CAPSULE    Take 1 capsule by mouth once daily.    NADOLOL (CORGARD) 20 MG TABLET    Take 1 tablet by mouth once daily    SERTRALINE (ZOLOFT) 100 MG TABLET    Take 1 tablet (100 mg total) by mouth once daily.     Review of patient's allergies indicates:  No Known Allergies  Review of Systems   Constitutional: Negative for decreased appetite.   HENT:  Negative for tinnitus.    Eyes:  Negative for double vision.   Cardiovascular:  Negative for chest pain.   Respiratory:  Negative for wheezing.    Hematologic/Lymphatic: Negative for bleeding problem.   Skin:  Negative for dry skin.   Musculoskeletal:  Positive for gout, joint pain and stiffness. Negative for arthritis, back pain and neck  pain.   Gastrointestinal:  Negative for abdominal pain.   Genitourinary:  Negative for bladder incontinence.   Neurological:  Negative for numbness, paresthesias and sensory change.   Psychiatric/Behavioral:  Negative for altered mental status.      Objective:   Body mass index is 32.13 kg/m².  Vitals:    01/27/23 0757   BP: 103/72   Pulse: 78   Resp: 18          General    Constitutional: He is oriented to person, place, and time. He appears well-developed.   HENT:   Head: Atraumatic.   Eyes: EOM are normal.   Cardiovascular:  Normal rate.            Pulmonary/Chest: Effort normal.   Neurological: He is alert and oriented to person, place, and time.   Psychiatric: Judgment normal.           Bilateral knee with passive range of motion of 5-120   Moderate varus deformity noted bilaterally.    crepitus to compression on the patella.    Collaterals and cruciates are stable.    Mild to moderate swelling.    Calves are soft, nontender  DF/PF intact  DP 1+  Skin is warm to touch no obvious lesions    FINDINGS:  No fracture.  The alignment is normal.  Joint space narrowing is identified greatest in the medial compartments greater on the right than left.  Marginal joint osteophytes and spurring of the tibial spines.  Diffuse osteopenia is identified.  No joint effusion.  No significant soft tissue swelling.     Impression:     Advanced osteoarthrosis of the bilateral knees greater on the right than left.  No fracture.  Kellgren Dick scale 3.     Assessment:     Encounter Diagnosis   Name Primary?    Primary osteoarthritis of both knees Yes        Plan:   Primary osteoarthritis of both knees  -     Prior authorization Order       There are no Patient Instructions on file for this visit.  The patient presents as a new patient to me today with chief complaint of bilateral knee pain, right greater than left. We had a long discussion today regarding degenerative arthritis in the knees. The patient understands that arthritis  is chronic and will worsen over time.  The patient also understands that arthritis may cause episodic flare-ups in pain. Management or if arthritis is achieved through a multi-modal approach including weight loss in obese individuals, activity modification, NSAIDs (topical vs oral) where appropriate, periodic intra-articular steroid injections, viscosupplementation, physical therapy, knee bracing, ambulatory aids, as well as geniculate nerve blocks.  Additionally we discussed adding turmeric and glucosamine and conjoint as well as super B vitamin to his regimen as patient would prefer a more natural approach.  Can use the Voltaren topical gel up to 3 times a day as needed.  He would like to hold off on any additional steroid injections today.  We discussed long term steroid injection in the form of Zilretta.  I would like to try to get him approved for this.  I would like to see him back in 3 months once authorization has been approved.      Disclaimer: This note was prepared using a voice recognition system and is likely to have sound alike errors within the text.

## 2023-04-19 ENCOUNTER — PATIENT MESSAGE (OUTPATIENT)
Dept: ADMINISTRATIVE | Facility: HOSPITAL | Age: 68
End: 2023-04-19
Payer: COMMERCIAL

## 2023-04-20 ENCOUNTER — OFFICE VISIT (OUTPATIENT)
Dept: ORTHOPEDICS | Facility: CLINIC | Age: 68
End: 2023-04-20
Payer: COMMERCIAL

## 2023-04-20 ENCOUNTER — TELEPHONE (OUTPATIENT)
Dept: ORTHOPEDICS | Facility: CLINIC | Age: 68
End: 2023-04-20
Payer: COMMERCIAL

## 2023-04-20 VITALS — WEIGHT: 243 LBS | BODY MASS INDEX: 32.2 KG/M2 | HEIGHT: 73 IN

## 2023-04-20 DIAGNOSIS — M17.0 PRIMARY OSTEOARTHRITIS OF BOTH KNEES: Primary | ICD-10-CM

## 2023-04-20 DIAGNOSIS — M21.162 ACQUIRED VARUS DEFORMITY KNEE, LEFT: ICD-10-CM

## 2023-04-20 DIAGNOSIS — M21.161 ACQUIRED VARUS DEFORMITY KNEE, RIGHT: ICD-10-CM

## 2023-04-20 PROCEDURE — 3288F FALL RISK ASSESSMENT DOCD: CPT | Mod: CPTII,S$GLB,, | Performed by: PHYSICIAN ASSISTANT

## 2023-04-20 PROCEDURE — 1159F PR MEDICATION LIST DOCUMENTED IN MEDICAL RECORD: ICD-10-PCS | Mod: CPTII,S$GLB,, | Performed by: PHYSICIAN ASSISTANT

## 2023-04-20 PROCEDURE — 20610 DRAIN/INJ JOINT/BURSA W/O US: CPT | Mod: LT,S$GLB,, | Performed by: PHYSICIAN ASSISTANT

## 2023-04-20 PROCEDURE — 4010F ACE/ARB THERAPY RXD/TAKEN: CPT | Mod: CPTII,S$GLB,, | Performed by: PHYSICIAN ASSISTANT

## 2023-04-20 PROCEDURE — 99214 PR OFFICE/OUTPT VISIT, EST, LEVL IV, 30-39 MIN: ICD-10-PCS | Mod: 25,S$GLB,, | Performed by: PHYSICIAN ASSISTANT

## 2023-04-20 PROCEDURE — 99999 PR PBB SHADOW E&M-EST. PATIENT-LVL III: CPT | Mod: PBBFAC,,, | Performed by: PHYSICIAN ASSISTANT

## 2023-04-20 PROCEDURE — 20610 LARGE JOINT ASPIRATION/INJECTION: L KNEE: ICD-10-PCS | Mod: LT,S$GLB,, | Performed by: PHYSICIAN ASSISTANT

## 2023-04-20 PROCEDURE — 1159F MED LIST DOCD IN RCRD: CPT | Mod: CPTII,S$GLB,, | Performed by: PHYSICIAN ASSISTANT

## 2023-04-20 PROCEDURE — 1125F AMNT PAIN NOTED PAIN PRSNT: CPT | Mod: CPTII,S$GLB,, | Performed by: PHYSICIAN ASSISTANT

## 2023-04-20 PROCEDURE — 1125F PR PAIN SEVERITY QUANTIFIED, PAIN PRESENT: ICD-10-PCS | Mod: CPTII,S$GLB,, | Performed by: PHYSICIAN ASSISTANT

## 2023-04-20 PROCEDURE — 1160F RVW MEDS BY RX/DR IN RCRD: CPT | Mod: CPTII,S$GLB,, | Performed by: PHYSICIAN ASSISTANT

## 2023-04-20 PROCEDURE — 4010F PR ACE/ARB THEARPY RXD/TAKEN: ICD-10-PCS | Mod: CPTII,S$GLB,, | Performed by: PHYSICIAN ASSISTANT

## 2023-04-20 PROCEDURE — 1160F PR REVIEW ALL MEDS BY PRESCRIBER/CLIN PHARMACIST DOCUMENTED: ICD-10-PCS | Mod: CPTII,S$GLB,, | Performed by: PHYSICIAN ASSISTANT

## 2023-04-20 PROCEDURE — 1101F PR PT FALLS ASSESS DOC 0-1 FALLS W/OUT INJ PAST YR: ICD-10-PCS | Mod: CPTII,S$GLB,, | Performed by: PHYSICIAN ASSISTANT

## 2023-04-20 PROCEDURE — 99999 PR PBB SHADOW E&M-EST. PATIENT-LVL III: ICD-10-PCS | Mod: PBBFAC,,, | Performed by: PHYSICIAN ASSISTANT

## 2023-04-20 PROCEDURE — 1101F PT FALLS ASSESS-DOCD LE1/YR: CPT | Mod: CPTII,S$GLB,, | Performed by: PHYSICIAN ASSISTANT

## 2023-04-20 PROCEDURE — 3008F PR BODY MASS INDEX (BMI) DOCUMENTED: ICD-10-PCS | Mod: CPTII,S$GLB,, | Performed by: PHYSICIAN ASSISTANT

## 2023-04-20 PROCEDURE — 3288F PR FALLS RISK ASSESSMENT DOCUMENTED: ICD-10-PCS | Mod: CPTII,S$GLB,, | Performed by: PHYSICIAN ASSISTANT

## 2023-04-20 PROCEDURE — 3008F BODY MASS INDEX DOCD: CPT | Mod: CPTII,S$GLB,, | Performed by: PHYSICIAN ASSISTANT

## 2023-04-20 PROCEDURE — 99214 OFFICE O/P EST MOD 30 MIN: CPT | Mod: 25,S$GLB,, | Performed by: PHYSICIAN ASSISTANT

## 2023-04-20 RX ORDER — LIDOCAINE HYDROCHLORIDE 10 MG/ML
5 INJECTION INFILTRATION; PERINEURAL
Status: DISCONTINUED | OUTPATIENT
Start: 2023-04-20 | End: 2023-04-20 | Stop reason: HOSPADM

## 2023-04-20 RX ORDER — METHYLPREDNISOLONE ACETATE 80 MG/ML
40 INJECTION, SUSPENSION INTRA-ARTICULAR; INTRALESIONAL; INTRAMUSCULAR; SOFT TISSUE
Status: DISCONTINUED | OUTPATIENT
Start: 2023-04-20 | End: 2023-04-20 | Stop reason: HOSPADM

## 2023-04-20 RX ADMIN — METHYLPREDNISOLONE ACETATE 40 MG: 80 INJECTION, SUSPENSION INTRA-ARTICULAR; INTRALESIONAL; INTRAMUSCULAR; SOFT TISSUE at 08:04

## 2023-04-20 RX ADMIN — LIDOCAINE HYDROCHLORIDE 5 ML: 10 INJECTION INFILTRATION; PERINEURAL at 08:04

## 2023-04-20 NOTE — PROCEDURES
"Byron Styles is a 67 y.o. male patient.    Weight: 110.2 kg (243 lb) (04/20/23 0828)  Height: 6' 1" (185.4 cm) (04/20/23 0828)       Large Joint Aspiration/Injection: L knee    Date/Time: 4/20/2023 8:30 AM  Performed by: LAINEY Hernandez  Authorized by: LAINEY Hernandez     Consent Done?:  Yes (Verbal)  Indications:  Arthritis and pain  Site marked: the procedure site was marked      Local anesthesia used?: Yes    Local anesthetic:  Topical anesthetic    Details:  Needle Size:  22 G  Approach:  Anterolateral  Location:  Knee  Site:  L knee  Medications:  5 mL LIDOcaine HCL 10 mg/ml (1%) 10 mg/mL (1 %); 40 mg methylPREDNISolone acetate 80 mg/mL  Patient tolerance:  Patient tolerated the procedure well with no immediate complications     Verbal consent was obtained  The patient's ID, site, side was verified  The site was sterile prepped in standard fashion  The injection was performed in the genie-lateral side without complication  A sterile Band-Aid was applied    Patient was directed to apply ice today at roughly 15 minutes at a time as needed.  It was discussed that they may be sore for the next few days or so.  Please avoid strenuous activity over the next 24 hours.  It was also discussed that the patient may have a increase in glucose if diabetic and should monitor levels.  Patient was instructed to call as needed.     4/20/2023    "

## 2023-04-20 NOTE — PROGRESS NOTES
Subjective:     Patient ID: Byron Styles is a 67 y.o. male.    Chief Complaint: Pain of the Right Knee and Pain of the Left Knee     10/08/2020  HPI:  65-year-old male who states been having pain in both of his knees for almost 6 years.  Denies any history of trauma.  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  He occasionally takes Aleve may be 3 times a week for a foot problem that he just developed and seen Dr. Gr.  He does have history of gout .  Also had some numbness and neuropathy in his feet recently started by Podiatry on Neurontin 100 mg and that seems to ease things up for him.  Makes him a little bit sleepy and he did not want increase the dose.   pain in the knees are 4/10 worst with activities.  He does give history of Raynaud disease sometimes periphery gets severely cold.  Denies any treatment in the legs in the form of any injections or treatment for arthritis.  Did have an ultrasound to the legs which was negative for DVT recently and that because his feet swell up on him.  Denies any fever or chills or shortness of breath or difficulty with chewing swallowing loss of bowel or bladder control or blurry vision or double vision or chest pain    11/19/2020  Patient stated the meloxicam seems to help helped significantly and not having any pain in his knees anymore.  He is doing really well at this point in time.  He will be finishing taking his gabapentin for his primary care by the end of this prescription since his feet are doing better.  Overall he is doing well.  He is still remaining active at work.  Denies any fever or chills or shortness of breath or difficulty with chewing or swallowing loss of bowel bladder control or blurry vision double vision or loss of sense smell taste  His pain level is 0/10 today and states the meloxicam worked great.  Blood pressure 116/74, pulse 73    12/02/2021  Bilateral knee arthritis.  He was  doing really well on the meloxicam and doing some independent exercises.  He was taken off by primary care because being on anti-inflammatories for long period of time could cause gastrointestinal, liver and kidney disease.  I did tell him taking it twice or 3 times a week should not be a big issue.  We can follow this with blood work every 6 months.  His pain is around 7/10 he has tries to take Tylenol for it.  He does take his medication for gout and his uric acid last evaluation was down to normal at 5.4.  He does ambulate without any assistive devices.  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  He does dabbed with [employer] work and would working on his own.  He does ambulate without any assistive devices.  His pain is 7/10  No fever no chills no shortness of breath or difficulty with chewing or swallowing loss of bowel bladder control      02/10/2022  Bilateral knee severe arthritis and varus deformity.  We gave him injections of 80 mg Depo-Medrol in each knee on 12/02/2021 with great relief of his pain.  Now he is taking meloxicam maybe twice a week occasional Tylenol.  He is ambulating without any assistive devices.  We did get him approved for viscosupplementation/Synvisc-One but at this time since he is doing well we will hold off.  I described Synvisc-One and viscosupplementation to him and I went over his x-rays again with him today.  Long discussion today about total knee replacement and he wanted to ask me all these questions and I brought in the model showed him x-ray about people have it and how it is done and wise it outpatient surgery at we spent quite a bit of time going over the surgery itself.  He is not there at this point he is doing really well his pain level is 1/10.  He wants to avoid surgery right now since he is doing better.    11/28/2022   Bilateral knee arthritis.  Patient states his pain is 3/10 sitting down however when he is very active it goes up to around 6.  He got approved  right now to receive viscosupplementation Synvisc-One.  He is maintaining very active life and he is working full-time right now.  He anticipates to work 5 more years because he feels healthy only taking blood pressure medications.  His knee are hurting him the swell up once in a while.  He does take meloxicam occasionally and Tylenol occasionally.  He wants to start taking baby aspirin and we discussed that in with him.    No fever no chills no shortness of breath or difficulty with chewing swallowing loss of bowel bladder control blurry vision double vision loss sense smell or taste    1/27/2023  Patient presents today with a chief complaint of bilateral knee pain.  Patient states pain is at worst 2/10 affecting activity of daily living and thus his quality of life.  The patient last received bilateral Gel injections 11/28/2022.  He states he has not noticed any difference or improvement to the knee.  He states today the right knee is hurting him more than the left although they take turns.  Additionally he states he is not been using his Mobic daily in fact it has been roughly 2 months since he is taking a pill.  He also states he has Voltaren gel at home which he does not use.    The patient likes to remain active in his day-to-day and notices intermittent swelling.  He would like to do everything to hold off surgery as he would like to be retired before having had the procedures done.    Patient is presently denying any shortness of breath, chest pain, fever/chills, nausea/vomiting, loss of taste or smell, numbness/tingling or sensation changes, loss of bladder or bowel function, loss of taste/smell.     4/20/2023  Patient presents today with chief complaint of bilateral knee pain, left greater than right.  Patient states pain today as a 2/10 presently although can increase as the day goes on.  He still remains quite active and is working both night and day rotating shifts that require high level of activity.   The patient states pain is increased with use and activity over the course of this day is affecting walking long distances, going from a sitting to standing standing to seated position on level terrain or stairs.  He is not using any devices to assist with ambulation nor is he wearing any knee braces.  Last received Synvisc 1 gel injection 11/28/2022.  He again believes that this has not provided any further relief.  He is taking Mobic although not daily only when a he absolutely needs to.  He has been able to make there was day-to-day but does feel like he is becoming more bowlegged as he is noticing it in his daily routine.  I tried for Xarelto authorization although this has been denied.  The patient inquires about repeating short-acting steroid today.    Patient is presently denying any shortness of breath, chest pain, fever/chills, nausea/vomiting, loss of taste or smell, numbness/tingling or sensation changes, loss of bladder or bowel function, loss of taste/smell.       Past Medical History:   Diagnosis Date    Colon polyps 2009    Depression     Hyperlipidemia     Hypertension     Localized osteoarthrosis not specified whether primary or secondary, lower leg 4/6/2015    Raynaud's disease     Skin cancer of arm, left 05/2020    [facility]     Past Surgical History:   Procedure Laterality Date    COLONOSCOPY W/ POLYPECTOMY  2009    MOUTH SURGERY      WISDOM TOOTH EXTRACTION       Family History   Problem Relation Age of Onset    Heart disease Father 65        CABG    Cancer Father         Bladder    Heart disease Brother 68        CABG    Depression Mother     Heart disease Mother         CHF     Social History     Socioeconomic History    Marital status: Single    Number of children: 0   Occupational History    Occupation:      Employer: [employer]   Tobacco Use    Smoking status: Never    Smokeless tobacco: Current     Types: Chew   Substance and Sexual  Activity    Alcohol use: Yes     Comment: social/ occassional    Drug use: No    Sexual activity: Not Currently     Medication List with Changes/Refills   Current Medications    ASPIRIN (ECOTRIN) 81 MG EC TABLET    Take 81 mg by mouth once daily.    ATORVASTATIN (LIPITOR) 40 MG TABLET    TAKE 1 TABLET BY MOUTH ONCE DAILY IN THE EVENING    FEBUXOSTAT (ULORIC) 40 MG TAB    Take 1 tablet by mouth once daily    LISINOPRIL-HYDROCHLOROTHIAZIDE (PRINZIDE,ZESTORETIC) 20-12.5 MG PER TABLET    Take 1 tablet by mouth once daily    MELOXICAM (MOBIC) 15 MG TABLET    Take 1 tablet by mouth once daily    MULTIVITAMIN CAPSULE    Take 1 capsule by mouth once daily.    NADOLOL (CORGARD) 20 MG TABLET    Take 1 tablet by mouth once daily    SERTRALINE (ZOLOFT) 100 MG TABLET    Take 1 tablet (100 mg total) by mouth once daily.     Review of patient's allergies indicates:  No Known Allergies  Review of Systems   Constitutional: Negative for decreased appetite.   HENT:  Negative for tinnitus.    Eyes:  Negative for double vision.   Cardiovascular:  Negative for chest pain.   Respiratory:  Negative for wheezing.    Hematologic/Lymphatic: Negative for bleeding problem.   Skin:  Negative for dry skin.   Musculoskeletal:  Positive for joint pain and stiffness. Negative for arthritis, back pain and neck pain.   Gastrointestinal:  Negative for abdominal pain.   Genitourinary:  Negative for bladder incontinence.   Neurological:  Negative for numbness, paresthesias and sensory change.   Psychiatric/Behavioral:  Negative for altered mental status.      Objective:   Body mass index is 32.06 kg/m².  There were no vitals filed for this visit.         General    Constitutional: He is oriented to person, place, and time. He appears well-developed.   HENT:   Head: Atraumatic.   Eyes: EOM are normal.   Cardiovascular:  Normal rate.            Pulmonary/Chest: Effort normal.   Neurological: He is alert and oriented to person, place, and time.    Psychiatric: Judgment normal.           Left knee with passive range of motion of 5-120   Moderate varus deformity noted bilaterally.    crepitus to compression on the patella.    Collaterals and cruciates are stable.    No defect in the quadriceps or patella tendon  No edema present  Calves are soft, nontender  DF/PF intact  DP 1+  Skin is warm to touch no obvious lesions    FINDINGS:  No fracture.  The alignment is normal.  Joint space narrowing is identified greatest in the medial compartments greater on the right than left.  Marginal joint osteophytes and spurring of the tibial spines.  Diffuse osteopenia is identified.  No joint effusion.  No significant soft tissue swelling.     Impression:     Advanced osteoarthrosis of the bilateral knees greater on the right than left.  No fracture.  Kellgren Dick scale 3.     Assessment:     Encounter Diagnoses   Name Primary?    Primary osteoarthritis of both knees Yes    Acquired varus deformity knee, left     Acquired varus deformity knee, right         Plan:   Primary osteoarthritis of both knees  -     Large Joint Aspiration/Injection: L knee  -     LIDOcaine HCL 10 mg/ml (1%) injection 5 mL  -     methylPREDNISolone acetate injection 40 mg    Acquired varus deformity knee, left    Acquired varus deformity knee, right         There are no Patient Instructions on file for this visit.  The patient presents as a new patient to me today with chief complaint of bilateral knee pain, right greater than left. We had a long discussion today regarding degenerative arthritis in the knees. The patient understands that arthritis is chronic and will worsen over time.  The patient also understands that arthritis may cause episodic flare-ups in pain. Management or if arthritis is achieved through a multi-modal approach including weight loss in obese individuals, activity modification, NSAIDs (topical vs oral) where appropriate, periodic intra-articular steroid injections,  viscosupplementation, physical therapy, knee bracing, ambulatory aids, as well as geniculate nerve blocks.  The patient elected to proceed with short-acting steroid into the left knee today.  He was instructed for elevation and ice as well as activity as tolerated he will refrain from soaking in standing water today.  He will continue the current medication regimen.  I would like to see him back in 3 months for further evaluation.  He may call with any questions or concerns in the interim.    Disclaimer: This note was prepared using a voice recognition system and is likely to have sound alike errors within the text.

## 2023-04-20 NOTE — TELEPHONE ENCOUNTER
Called the patient in regards to their appointment today. Left a message letting the patient know that their Zilretta injection was denied through their insurance. Informed the patient that Vielka states that she can send in a steroid dose pack to their pharmacy. Asked the patient to give the office a call back.

## 2023-04-26 ENCOUNTER — PATIENT MESSAGE (OUTPATIENT)
Dept: INTERNAL MEDICINE | Facility: CLINIC | Age: 68
End: 2023-04-26
Payer: COMMERCIAL

## 2023-06-15 ENCOUNTER — PATIENT MESSAGE (OUTPATIENT)
Dept: INTERNAL MEDICINE | Facility: CLINIC | Age: 68
End: 2023-06-15

## 2023-06-15 ENCOUNTER — OFFICE VISIT (OUTPATIENT)
Dept: INTERNAL MEDICINE | Facility: CLINIC | Age: 68
End: 2023-06-15
Payer: COMMERCIAL

## 2023-06-15 VITALS
DIASTOLIC BLOOD PRESSURE: 78 MMHG | SYSTOLIC BLOOD PRESSURE: 112 MMHG | OXYGEN SATURATION: 98 % | HEART RATE: 63 BPM | HEIGHT: 73 IN | WEIGHT: 241.63 LBS | BODY MASS INDEX: 32.02 KG/M2

## 2023-06-15 DIAGNOSIS — Z12.11 COLON CANCER SCREENING: ICD-10-CM

## 2023-06-15 DIAGNOSIS — Z00.00 ROUTINE GENERAL MEDICAL EXAMINATION AT HEALTH CARE FACILITY: Primary | ICD-10-CM

## 2023-06-15 DIAGNOSIS — I10 ESSENTIAL HYPERTENSION: ICD-10-CM

## 2023-06-15 DIAGNOSIS — M10.9 GOUT, UNSPECIFIED CAUSE, UNSPECIFIED CHRONICITY, UNSPECIFIED SITE: ICD-10-CM

## 2023-06-15 DIAGNOSIS — E78.2 MIXED HYPERLIPIDEMIA: ICD-10-CM

## 2023-06-15 DIAGNOSIS — Z12.5 SCREENING FOR MALIGNANT NEOPLASM OF PROSTATE: ICD-10-CM

## 2023-06-15 PROCEDURE — 4010F ACE/ARB THERAPY RXD/TAKEN: CPT | Mod: CPTII,S$GLB,, | Performed by: NURSE PRACTITIONER

## 2023-06-15 PROCEDURE — 3288F FALL RISK ASSESSMENT DOCD: CPT | Mod: CPTII,S$GLB,, | Performed by: NURSE PRACTITIONER

## 2023-06-15 PROCEDURE — 3008F PR BODY MASS INDEX (BMI) DOCUMENTED: ICD-10-PCS | Mod: CPTII,S$GLB,, | Performed by: NURSE PRACTITIONER

## 2023-06-15 PROCEDURE — 3008F BODY MASS INDEX DOCD: CPT | Mod: CPTII,S$GLB,, | Performed by: NURSE PRACTITIONER

## 2023-06-15 PROCEDURE — 3074F PR MOST RECENT SYSTOLIC BLOOD PRESSURE < 130 MM HG: ICD-10-PCS | Mod: CPTII,S$GLB,, | Performed by: NURSE PRACTITIONER

## 2023-06-15 PROCEDURE — 1126F AMNT PAIN NOTED NONE PRSNT: CPT | Mod: CPTII,S$GLB,, | Performed by: NURSE PRACTITIONER

## 2023-06-15 PROCEDURE — 3288F PR FALLS RISK ASSESSMENT DOCUMENTED: ICD-10-PCS | Mod: CPTII,S$GLB,, | Performed by: NURSE PRACTITIONER

## 2023-06-15 PROCEDURE — 1160F PR REVIEW ALL MEDS BY PRESCRIBER/CLIN PHARMACIST DOCUMENTED: ICD-10-PCS | Mod: CPTII,S$GLB,, | Performed by: NURSE PRACTITIONER

## 2023-06-15 PROCEDURE — 1159F MED LIST DOCD IN RCRD: CPT | Mod: CPTII,S$GLB,, | Performed by: NURSE PRACTITIONER

## 2023-06-15 PROCEDURE — 99214 PR OFFICE/OUTPT VISIT, EST, LEVL IV, 30-39 MIN: ICD-10-PCS | Mod: S$GLB,,, | Performed by: NURSE PRACTITIONER

## 2023-06-15 PROCEDURE — 4010F PR ACE/ARB THEARPY RXD/TAKEN: ICD-10-PCS | Mod: CPTII,S$GLB,, | Performed by: NURSE PRACTITIONER

## 2023-06-15 PROCEDURE — 3074F SYST BP LT 130 MM HG: CPT | Mod: CPTII,S$GLB,, | Performed by: NURSE PRACTITIONER

## 2023-06-15 PROCEDURE — 1159F PR MEDICATION LIST DOCUMENTED IN MEDICAL RECORD: ICD-10-PCS | Mod: CPTII,S$GLB,, | Performed by: NURSE PRACTITIONER

## 2023-06-15 PROCEDURE — 3078F DIAST BP <80 MM HG: CPT | Mod: CPTII,S$GLB,, | Performed by: NURSE PRACTITIONER

## 2023-06-15 PROCEDURE — 1160F RVW MEDS BY RX/DR IN RCRD: CPT | Mod: CPTII,S$GLB,, | Performed by: NURSE PRACTITIONER

## 2023-06-15 PROCEDURE — 3078F PR MOST RECENT DIASTOLIC BLOOD PRESSURE < 80 MM HG: ICD-10-PCS | Mod: CPTII,S$GLB,, | Performed by: NURSE PRACTITIONER

## 2023-06-15 PROCEDURE — 1126F PR PAIN SEVERITY QUANTIFIED, NO PAIN PRESENT: ICD-10-PCS | Mod: CPTII,S$GLB,, | Performed by: NURSE PRACTITIONER

## 2023-06-15 PROCEDURE — 1101F PR PT FALLS ASSESS DOC 0-1 FALLS W/OUT INJ PAST YR: ICD-10-PCS | Mod: CPTII,S$GLB,, | Performed by: NURSE PRACTITIONER

## 2023-06-15 PROCEDURE — 1101F PT FALLS ASSESS-DOCD LE1/YR: CPT | Mod: CPTII,S$GLB,, | Performed by: NURSE PRACTITIONER

## 2023-06-15 PROCEDURE — 99999 PR PBB SHADOW E&M-EST. PATIENT-LVL IV: CPT | Mod: PBBFAC,,, | Performed by: NURSE PRACTITIONER

## 2023-06-15 PROCEDURE — 99999 PR PBB SHADOW E&M-EST. PATIENT-LVL IV: ICD-10-PCS | Mod: PBBFAC,,, | Performed by: NURSE PRACTITIONER

## 2023-06-15 PROCEDURE — 99214 OFFICE O/P EST MOD 30 MIN: CPT | Mod: S$GLB,,, | Performed by: NURSE PRACTITIONER

## 2023-06-15 RX ORDER — GABAPENTIN 100 MG/1
100 CAPSULE ORAL 2 TIMES DAILY
COMMUNITY
Start: 2023-04-25

## 2023-06-15 RX ORDER — ALLOPURINOL 100 MG/1
100 TABLET ORAL DAILY
Qty: 90 TABLET | Refills: 3 | Status: SHIPPED | OUTPATIENT
Start: 2023-06-15 | End: 2024-01-20

## 2023-06-15 NOTE — PROGRESS NOTES
"Subjective:       Patient ID: Byron Styles is a 68 y.o. male.    Chief Complaint: Annual Exam    Pt is a 68-year-old male presenting today for annual exam  Pt with hypertension, hyperlipidemia, depression.    In regards to his chronic issues he relates he is doing well.    He is taking his medications as prescribed without adverse effects.    He is tolerating the medications well   His depression has been stable with no exacerbations.     Patient is overdue for colon cancer screening.    He has had issues as he does not have any family locally and has not been able to find a way to get a ride to and from the colonoscopy.  Unfortunately his initial colonoscopy did have a couple of polyps on it so he is not a candidate for Cologuard or fit kit testing.    We discussed friends or coworkers as an option and he indicates he just has not been able to find somebody at this point.    He is interested in getting the scope done on but this issue with the ride has been problematic.    Uloric is getting expensive- request alternated   Controlling his gout well  Previously he was having attacks almost 2 times a month       /78   Pulse 63   Ht 6' 1" (1.854 m)   Wt 109.6 kg (241 lb 10 oz)   SpO2 98%   BMI 31.88 kg/m²     Review of Systems   Constitutional:  Negative for activity change and unexpected weight change.   HENT:  Negative for hearing loss, rhinorrhea and trouble swallowing.    Eyes:  Negative for discharge and visual disturbance.   Respiratory:  Negative for chest tightness and wheezing.    Cardiovascular:  Negative for chest pain and palpitations.   Gastrointestinal:  Negative for blood in stool, constipation, diarrhea and vomiting.   Endocrine: Negative for polydipsia and polyuria.   Genitourinary:  Negative for difficulty urinating, hematuria and urgency.   Musculoskeletal:  Positive for arthralgias. Negative for joint swelling and neck pain.   Neurological:  Negative for weakness and headaches. "   Psychiatric/Behavioral:  Negative for confusion and dysphoric mood.      Objective:      Physical Exam  Vitals reviewed.   Constitutional:       General: He is not in acute distress.     Appearance: Normal appearance. He is normal weight.   HENT:      Head: Normocephalic.      Right Ear: Tympanic membrane normal.      Left Ear: Tympanic membrane normal.      Nose: Nose normal.   Eyes:      Conjunctiva/sclera: Conjunctivae normal.      Pupils: Pupils are equal, round, and reactive to light.   Cardiovascular:      Rate and Rhythm: Normal rate.      Pulses: Normal pulses.   Pulmonary:      Effort: Pulmonary effort is normal.   Abdominal:      General: Abdomen is flat.      Palpations: Abdomen is soft.   Musculoskeletal:         General: Normal range of motion.      Cervical back: Normal range of motion.   Skin:     General: Skin is warm.      Capillary Refill: Capillary refill takes less than 2 seconds.   Neurological:      Mental Status: He is alert and oriented to person, place, and time. Mental status is at baseline.   Psychiatric:         Mood and Affect: Mood normal.       Assessment:       1. Routine general medical examination at health care facility    2. Essential hypertension    3. Mixed hyperlipidemia    4. Screening for malignant neoplasm of prostate    5. Colon cancer screening    6. Gout, unspecified cause, unspecified chronicity, unspecified site    7. BMI 31.0-31.9,adult        Plan:       Byron was seen today for annual exam.    Diagnoses and all orders for this visit:    Routine general medical examination at health care facility      [facility] for age discussed. Pt will see if one of his siblings can arrange to take him for cscope  Essential hypertension  -     CBC Auto Differential; Future  -     Comprehensive Metabolic Panel; Future  -     Lipid Panel; Future  - Chronic, stable on current meds. Continue     Mixed hyperlipidemia  -     Lipid Panel; Future  - Chronic, stable on current meds. Will update  FLP     Screening for malignant neoplasm of prostate  -     PSA, Screening; Future    Colon cancer screening       - We discussed transportation arrangements and pt will work on this. Will notify us when he is able to have the cscope done   Gout, unspecified cause, unspecified chronicity, unspecified site      - Chronic, well controlled. Will change to allopurinol due to cost of uloric. Monitor symptoms   BMI 31.0-31.9,adult      Vitals reviewed and stable. BP within normal range at 112/78. Labs due     Patient has been making dietary changes. Patient is to continue eating a well balanced diet.      He is encouraged to engage in exercise outside of his day to day activities.      Patient advised to have colon cancer screening. Will try to arrange for transportation and let us know     Questions and concerns addressed.      Follow up in 6 months or PRN.

## 2023-07-22 DIAGNOSIS — M17.11 ARTHRITIS OF RIGHT KNEE: ICD-10-CM

## 2023-07-22 DIAGNOSIS — M25.551 RIGHT HIP PAIN: ICD-10-CM

## 2023-07-24 RX ORDER — MELOXICAM 15 MG/1
TABLET ORAL
Qty: 30 TABLET | Refills: 0 | Status: SHIPPED | OUTPATIENT
Start: 2023-07-24 | End: 2023-10-19

## 2023-08-10 ENCOUNTER — OFFICE VISIT (OUTPATIENT)
Dept: ORTHOPEDICS | Facility: CLINIC | Age: 68
End: 2023-08-10
Payer: COMMERCIAL

## 2023-08-10 DIAGNOSIS — M17.0 PRIMARY OSTEOARTHRITIS OF BOTH KNEES: Primary | ICD-10-CM

## 2023-08-10 DIAGNOSIS — M21.161 ACQUIRED VARUS DEFORMITY KNEE, RIGHT: ICD-10-CM

## 2023-08-10 DIAGNOSIS — M21.162 ACQUIRED VARUS DEFORMITY KNEE, LEFT: ICD-10-CM

## 2023-08-10 PROCEDURE — 1160F PR REVIEW ALL MEDS BY PRESCRIBER/CLIN PHARMACIST DOCUMENTED: ICD-10-PCS | Mod: CPTII,S$GLB,, | Performed by: PHYSICIAN ASSISTANT

## 2023-08-10 PROCEDURE — 1159F PR MEDICATION LIST DOCUMENTED IN MEDICAL RECORD: ICD-10-PCS | Mod: CPTII,S$GLB,, | Performed by: PHYSICIAN ASSISTANT

## 2023-08-10 PROCEDURE — 99024 POSTOP FOLLOW-UP VISIT: CPT | Mod: S$GLB,,, | Performed by: PHYSICIAN ASSISTANT

## 2023-08-10 PROCEDURE — 99999 PR PBB SHADOW E&M-EST. PATIENT-LVL III: ICD-10-PCS | Mod: PBBFAC,,, | Performed by: PHYSICIAN ASSISTANT

## 2023-08-10 PROCEDURE — 1160F RVW MEDS BY RX/DR IN RCRD: CPT | Mod: CPTII,S$GLB,, | Performed by: PHYSICIAN ASSISTANT

## 2023-08-10 PROCEDURE — 4010F PR ACE/ARB THEARPY RXD/TAKEN: ICD-10-PCS | Mod: CPTII,S$GLB,, | Performed by: PHYSICIAN ASSISTANT

## 2023-08-10 PROCEDURE — 1159F MED LIST DOCD IN RCRD: CPT | Mod: CPTII,S$GLB,, | Performed by: PHYSICIAN ASSISTANT

## 2023-08-10 PROCEDURE — 99024 PR POST-OP FOLLOW-UP VISIT: ICD-10-PCS | Mod: S$GLB,,, | Performed by: PHYSICIAN ASSISTANT

## 2023-08-10 PROCEDURE — 4010F ACE/ARB THERAPY RXD/TAKEN: CPT | Mod: CPTII,S$GLB,, | Performed by: PHYSICIAN ASSISTANT

## 2023-08-10 PROCEDURE — 99999 PR PBB SHADOW E&M-EST. PATIENT-LVL III: CPT | Mod: PBBFAC,,, | Performed by: PHYSICIAN ASSISTANT

## 2023-08-10 NOTE — PROGRESS NOTES
Subjective:     Patient ID: Byron Styles is a 68 y.o. male.    Chief Complaint: No chief complaint on file.     10/08/2020  HPI:  65-year-old male who states been having pain in both of his knees for almost 6 years.  Denies any history of trauma.  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  He occasionally takes Aleve may be 3 times a week for a foot problem that he just developed and seen Dr. Gr.  He does have history of gout .  Also had some numbness and neuropathy in his feet recently started by Podiatry on Neurontin 100 mg and that seems to ease things up for him.  Makes him a little bit sleepy and he did not want increase the dose.   pain in the knees are 4/10 worst with activities.  He does give history of Raynaud disease sometimes periphery gets severely cold.  Denies any treatment in the legs in the form of any injections or treatment for arthritis.  Did have an ultrasound to the legs which was negative for DVT recently and that because his feet swell up on him.  Denies any fever or chills or shortness of breath or difficulty with chewing swallowing loss of bowel or bladder control or blurry vision or double vision or chest pain    11/19/2020  Patient stated the meloxicam seems to help helped significantly and not having any pain in his knees anymore.  He is doing really well at this point in time.  He will be finishing taking his gabapentin for his primary care by the end of this prescription since his feet are doing better.  Overall he is doing well.  He is still remaining active at work.  Denies any fever or chills or shortness of breath or difficulty with chewing or swallowing loss of bowel bladder control or blurry vision double vision or loss of sense smell taste  His pain level is 0/10 today and states the meloxicam worked great.  Blood pressure 116/74, pulse 73    12/02/2021  Bilateral knee arthritis.  He was doing really well on the  meloxicam and doing some independent exercises.  He was taken off by primary care because being on anti-inflammatories for long period of time could cause gastrointestinal, liver and kidney disease.  I did tell him taking it twice or 3 times a week should not be a big issue.  We can follow this with blood work every 6 months.  His pain is around 7/10 he has tries to take Tylenol for it.  He does take his medication for gout and his uric acid last evaluation was down to normal at 5.4.  He does ambulate without any assistive devices.  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  He does dabbed with [employer] work and would working on his own.  He does ambulate without any assistive devices.  His pain is 7/10  No fever no chills no shortness of breath or difficulty with chewing or swallowing loss of bowel bladder control      02/10/2022  Bilateral knee severe arthritis and varus deformity.  We gave him injections of 80 mg Depo-Medrol in each knee on 12/02/2021 with great relief of his pain.  Now he is taking meloxicam maybe twice a week occasional Tylenol.  He is ambulating without any assistive devices.  We did get him approved for viscosupplementation/Synvisc-One but at this time since he is doing well we will hold off.  I described Synvisc-One and viscosupplementation to him and I went over his x-rays again with him today.  Long discussion today about total knee replacement and he wanted to ask me all these questions and I brought in the model showed him x-ray about people have it and how it is done and wise it outpatient surgery at we spent quite a bit of time going over the surgery itself.  He is not there at this point he is doing really well his pain level is 1/10.  He wants to avoid surgery right now since he is doing better.    11/28/2022   Bilateral knee arthritis.  Patient states his pain is 3/10 sitting down however when he is very active it goes up to around 6.  He got approved right now to receive  viscosupplementation Synvisc-One.  He is maintaining very active life and he is working full-time right now.  He anticipates to work 5 more years because he feels healthy only taking blood pressure medications.  His knee are hurting him the swell up once in a while.  He does take meloxicam occasionally and Tylenol occasionally.  He wants to start taking baby aspirin and we discussed that in with him.    No fever no chills no shortness of breath or difficulty with chewing swallowing loss of bowel bladder control blurry vision double vision loss sense smell or taste    1/27/2023  Patient presents today with a chief complaint of bilateral knee pain.  Patient states pain is at worst 2/10 affecting activity of daily living and thus his quality of life.  The patient last received bilateral Gel injections 11/28/2022.  He states he has not noticed any difference or improvement to the knee.  He states today the right knee is hurting him more than the left although they take turns.  Additionally he states he is not been using his Mobic daily in fact it has been roughly 2 months since he is taking a pill.  He also states he has Voltaren gel at home which he does not use.    The patient likes to remain active in his day-to-day and notices intermittent swelling.  He would like to do everything to hold off surgery as he would like to be retired before having had the procedures done.    Patient is presently denying any shortness of breath, chest pain, fever/chills, nausea/vomiting, loss of taste or smell, numbness/tingling or sensation changes, loss of bladder or bowel function, loss of taste/smell.     4/20/2023  Patient presents today with chief complaint of bilateral knee pain, left greater than right.  Patient states pain today as a 2/10 presently although can increase as the day goes on.  He still remains quite active and is working both night and day rotating shifts that require high level of activity.  The patient states  pain is increased with use and activity over the course of this day is affecting walking long distances, going from a sitting to standing standing to seated position on level terrain or stairs.  He is not using any devices to assist with ambulation nor is he wearing any knee braces.  Last received Synvisc 1 gel injection 11/28/2022.  He again believes that this has not provided any further relief.  He is taking Mobic although not daily only when a he absolutely needs to.  He has been able to make there was day-to-day but does feel like he is becoming more bowlegged as he is noticing it in his daily routine.  I tried for Xarelto authorization although this has been denied.  The patient inquires about repeating short-acting steroid today.    Patient is presently denying any shortness of breath, chest pain, fever/chills, nausea/vomiting, loss of taste or smell, numbness/tingling or sensation changes, loss of bladder or bowel function, loss of taste/smell.     8/10/2023  Patient presents with chief complaint of bilateral knee pain, left greater than right.  The patient states pain is at worst a 4/10 affecting activity of daily living and thus his quality of life.  He states the steroid injection he received in April is doing quite well.  He prefers this as opposed to the gel injections he received November of last year.  He states pain still with use and activity over the course of the day but nothing he can not handle with his present .  He is not using any devices to assist in ambulation nor is he wearing any knee braces.  He remains quite active in his daily routine which he states helps.  He has starting to take the Mobic more often throughout the week or else he feels stiffness increasing.  We discussed adding topical pain gel such as Voltaren or Biofreeze to his regimen as well.    Patient is presently denying any shortness of breath, chest pain, fever/chills, nausea/vomiting, loss of taste or smell,  numbness/tingling or sensation changes, loss of bladder or bowel function, loss of taste/smell.       Past Medical History:   Diagnosis Date    Colon polyps 2009    Depression     Hyperlipidemia     Hypertension     Localized osteoarthrosis not specified whether primary or secondary, lower leg 4/6/2015    Raynaud's disease     Skin cancer of arm, left 05/2020    [facility]     Past Surgical History:   Procedure Laterality Date    COLONOSCOPY W/ POLYPECTOMY  2009    MOUTH SURGERY      WISDOM TOOTH EXTRACTION       Family History   Problem Relation Age of Onset    Heart disease Father 65        CABG    Cancer Father         Bladder    Heart disease Brother 68        CABG    Depression Mother     Heart disease Mother         CHF     Social History     Socioeconomic History    Marital status: Single    Number of children: 0   Occupational History    Occupation:      Employer: [employer]   Tobacco Use    Smoking status: Never    Smokeless tobacco: Current     Types: Chew   Substance and Sexual Activity    Alcohol use: Yes     Comment: social/ occassional    Drug use: No    Sexual activity: Not Currently     Medication List with Changes/Refills   Current Medications    ALLOPURINOL (ZYLOPRIM) 100 MG TABLET    Take 1 tablet (100 mg total) by mouth once daily.    ASPIRIN (ECOTRIN) 81 MG EC TABLET    Take 81 mg by mouth once daily.    ATORVASTATIN (LIPITOR) 40 MG TABLET    TAKE 1 TABLET BY MOUTH ONCE DAILY IN THE EVENING    GABAPENTIN (NEURONTIN) 100 MG CAPSULE    Take 100 mg by mouth 2 (two) times daily.    LISINOPRIL-HYDROCHLOROTHIAZIDE (PRINZIDE,ZESTORETIC) 20-12.5 MG PER TABLET    Take 1 tablet by mouth once daily    MELOXICAM (MOBIC) 15 MG TABLET    Take 1 tablet by mouth once daily    MULTIVITAMIN CAPSULE    Take 1 capsule by mouth once daily.    NADOLOL (CORGARD) 20 MG TABLET    Take 1 tablet by mouth once daily    SERTRALINE (ZOLOFT) 100 MG TABLET    Take 1 tablet  (100 mg total) by mouth once daily.     Review of patient's allergies indicates:  No Known Allergies  Review of Systems   Constitutional: Negative for decreased appetite.   HENT:  Negative for tinnitus.    Eyes:  Negative for double vision.   Cardiovascular:  Negative for chest pain.   Respiratory:  Negative for wheezing.    Hematologic/Lymphatic: Negative for bleeding problem.   Skin:  Negative for dry skin.   Musculoskeletal:  Positive for joint pain and stiffness. Negative for arthritis, back pain and neck pain.   Gastrointestinal:  Negative for abdominal pain.   Genitourinary:  Negative for bladder incontinence.   Neurological:  Negative for numbness, paresthesias and sensory change.   Psychiatric/Behavioral:  Negative for altered mental status.        Objective:   There is no height or weight on file to calculate BMI.  There were no vitals filed for this visit.         General    Constitutional: He is oriented to person, place, and time. He appears well-developed.   HENT:   Head: Atraumatic.   Eyes: EOM are normal.   Cardiovascular:  Normal rate.            Pulmonary/Chest: Effort normal.   Neurological: He is alert and oriented to person, place, and time.   Psychiatric: Judgment normal.             Left knee with passive range of motion of 5-120   Moderate varus deformity noted bilaterally.    crepitus to compression on the patella.    Collaterals and cruciates are stable.    No defect in the quadriceps or patella tendon  No edema present  Calves are soft, nontender  DF/PF intact  DP 1+  Skin is warm to touch no obvious lesions    FINDINGS:  No fracture.  The alignment is normal.  Joint space narrowing is identified greatest in the medial compartments greater on the right than left.  Marginal joint osteophytes and spurring of the tibial spines.  Diffuse osteopenia is identified.  No joint effusion.  No significant soft tissue swelling.     Impression:     Advanced osteoarthrosis of the bilateral knees greater  on the right than left.  No fracture.  Kellgren Dick scale 3.     Assessment:     No diagnosis found.       Plan:   There are no diagnoses linked to this encounter.       There are no Patient Instructions on file for this visit.  The patient presents as a new patient to me today with chief complaint of bilateral knee pain, right greater than left. We had a long discussion today regarding degenerative arthritis in the knees. The patient understands that arthritis is chronic and will worsen over time.  The patient also understands that arthritis may cause episodic flare-ups in pain. Management or if arthritis is achieved through a multi-modal approach including weight loss in obese individuals, activity modification, NSAIDs (topical vs oral) where appropriate, periodic intra-articular steroid injections, viscosupplementation, physical therapy, knee bracing, ambulatory aids, as well as geniculate nerve blocks.  The patient elected to hold off on any injections today as he does feel he is still seeing some results from the short-acting steroid.  I asked that he follow-up in 3 months for further evaluation.  Should he need an appointment sooner than this he may call our office to schedule an injection.        Disclaimer: This note was prepared using a voice recognition system and is likely to have sound alike errors within the text.

## 2023-09-12 RX ORDER — SERTRALINE HYDROCHLORIDE 100 MG/1
100 TABLET, FILM COATED ORAL
Qty: 90 TABLET | Refills: 0 | Status: SHIPPED | OUTPATIENT
Start: 2023-09-12 | End: 2024-01-20

## 2023-09-12 NOTE — TELEPHONE ENCOUNTER
Care Due:                  Date            Visit Type   Department     Provider  --------------------------------------------------------------------------------                                EP -                              PRIMARY      [facility] INTERNAL  Last Visit: 06-      CARE ([facility])   MEDICINE       Ravindra Bernard                              MYCHART                              FOLLOWUP/OF  [facility] INTERNAL  Next Visit: 12-      [facility]ICE VISIT   MEDICINE       Ravindra Bernard                                                            Last  Test          Frequency    Reason                     Performed    Due Date  --------------------------------------------------------------------------------    Office Visit  12 months..  atorvastatin,              06- 06-                             lisinopriL-hydrochlorothi                             azide, sertraline........    CMP.........  12 months..  atorvastatin,              08- 08-                             lisinopriL-hydrochlorothi                             azide....................    Lipid Panel.  12 months..  atorvastatin.............  08- 08-    [facility] Embedded Care Due Messages. Reference number: [id number].   9/12/2023 7:00:41 AM CDT

## 2023-10-19 DIAGNOSIS — M17.11 ARTHRITIS OF RIGHT KNEE: ICD-10-CM

## 2023-10-19 DIAGNOSIS — M25.551 RIGHT HIP PAIN: ICD-10-CM

## 2023-10-19 RX ORDER — MELOXICAM 15 MG/1
TABLET ORAL
Qty: 30 TABLET | Refills: 0 | Status: SHIPPED | OUTPATIENT
Start: 2023-10-19 | End: 2023-12-12

## 2023-12-12 DIAGNOSIS — M17.11 ARTHRITIS OF RIGHT KNEE: ICD-10-CM

## 2023-12-12 DIAGNOSIS — M25.551 RIGHT HIP PAIN: ICD-10-CM

## 2023-12-12 DIAGNOSIS — E78.5 HYPERLIPIDEMIA: ICD-10-CM

## 2023-12-12 RX ORDER — ATORVASTATIN CALCIUM 40 MG/1
40 TABLET, FILM COATED ORAL NIGHTLY
Qty: 90 TABLET | Refills: 0 | Status: SHIPPED | OUTPATIENT
Start: 2023-12-12

## 2023-12-12 RX ORDER — MELOXICAM 15 MG/1
TABLET ORAL
Qty: 30 TABLET | Refills: 0 | Status: SHIPPED | OUTPATIENT
Start: 2023-12-12 | End: 2024-03-28 | Stop reason: SDUPTHER

## 2023-12-12 NOTE — TELEPHONE ENCOUNTER
Care Due:                  Date            Visit Type   Department     Provider  --------------------------------------------------------------------------------                                EP -                              PRIMARY      [facility] INTERNAL  Last Visit: 06-      CARE ([facility])   MEDICINE       Ravindra Bernard                              MYCHART                              FOLLOWUP/OF  [facility] INTERNAL  Next Visit: 12-      FICE VISIT   MEDICINE       Ravindra Bernard                                                            Last  Test          Frequency    Reason                     Performed    Due Date  --------------------------------------------------------------------------------    CMP.........  12 months..  atorvastatin, febuxostat,   08- 08-                             lisinopriL-hydrochlorothi                             azide....................    Lipid Panel.  12 months..  atorvastatin.............  08- 08-    Uric Acid...  12 months..  febuxostat...............  Not Found    Overdue    [employer] Embedded Care Due Messages. Reference number: [id number].   12/12/2023 2:57:30 PM CST

## 2023-12-13 NOTE — TELEPHONE ENCOUNTER
Refill Routing Note   Medication(s) are not appropriate for processing by [facility] for the following reason(s):        Patient not seen by provider within 15 months  Required labs outdated    [facility] action(s):  Defer     Requires labs : Yes             Appointments  past 12m or future 3m with PCP    Date Provider   Last Visit   6/16/2022 Ravindra Bernard MD   Next Visit   12/28/2023 Ravindra Bernard MD   ED visits in past 90 days: 0        Note composed:6:07 PM 12/12/2023

## 2023-12-19 ENCOUNTER — LAB VISIT (OUTPATIENT)
Dept: LAB | Facility: HOSPITAL | Age: 68
End: 2023-12-19
Attending: NURSE PRACTITIONER
Payer: COMMERCIAL

## 2023-12-19 ENCOUNTER — OFFICE VISIT (OUTPATIENT)
Dept: INTERNAL MEDICINE | Facility: CLINIC | Age: 68
End: 2023-12-19
Payer: COMMERCIAL

## 2023-12-19 VITALS
TEMPERATURE: 96 F | WEIGHT: 239.88 LBS | BODY MASS INDEX: 31.65 KG/M2 | HEART RATE: 68 BPM | SYSTOLIC BLOOD PRESSURE: 104 MMHG | DIASTOLIC BLOOD PRESSURE: 80 MMHG | OXYGEN SATURATION: 95 %

## 2023-12-19 DIAGNOSIS — I10 ESSENTIAL HYPERTENSION: ICD-10-CM

## 2023-12-19 DIAGNOSIS — M10.9 GOUT, UNSPECIFIED CAUSE, UNSPECIFIED CHRONICITY, UNSPECIFIED SITE: ICD-10-CM

## 2023-12-19 DIAGNOSIS — Z12.5 SCREENING FOR MALIGNANT NEOPLASM OF PROSTATE: ICD-10-CM

## 2023-12-19 DIAGNOSIS — F32.A DEPRESSION, UNSPECIFIED DEPRESSION TYPE: ICD-10-CM

## 2023-12-19 DIAGNOSIS — I10 ESSENTIAL HYPERTENSION: Primary | ICD-10-CM

## 2023-12-19 DIAGNOSIS — E78.2 MIXED HYPERLIPIDEMIA: ICD-10-CM

## 2023-12-19 LAB
ALBUMIN SERPL BCP-MCNC: 3.7 G/DL (ref 3.5–5.2)
ALP SERPL-CCNC: 84 U/L (ref 55–135)
ALT SERPL W/O P-5'-P-CCNC: 31 U/L (ref 10–44)
ANION GAP SERPL CALC-SCNC: 11 MMOL/L (ref 8–16)
AST SERPL-CCNC: 27 U/L (ref 10–40)
BASOPHILS # BLD AUTO: 0.07 K/UL (ref 0–0.2)
BASOPHILS NFR BLD: 1 % (ref 0–1.9)
BILIRUB SERPL-MCNC: 1.1 MG/DL (ref 0.1–1)
BUN SERPL-MCNC: 14 MG/DL (ref 8–23)
CALCIUM SERPL-MCNC: 9.5 MG/DL (ref 8.7–10.5)
CHLORIDE SERPL-SCNC: 102 MMOL/L (ref 95–110)
CHOLEST SERPL-MCNC: 174 MG/DL (ref 120–199)
CHOLEST/HDLC SERPL: 4.8 {RATIO} (ref 2–5)
CO2 SERPL-SCNC: 29 MMOL/L (ref 23–29)
COMPLEXED PSA SERPL-MCNC: 0.95 NG/ML (ref 0–4)
CREAT SERPL-MCNC: 1 MG/DL (ref 0.5–1.4)
DIFFERENTIAL METHOD: NORMAL
EOSINOPHIL # BLD AUTO: 0.2 K/UL (ref 0–0.5)
EOSINOPHIL NFR BLD: 2.1 % (ref 0–8)
ERYTHROCYTE [DISTWIDTH] IN BLOOD BY AUTOMATED COUNT: 13.4 % (ref 11.5–14.5)
EST. GFR  (NO RACE VARIABLE): >60 ML/MIN/1.73 M^2
GLUCOSE SERPL-MCNC: 88 MG/DL (ref 70–110)
HCT VFR BLD AUTO: 48.2 % (ref 40–54)
HDLC SERPL-MCNC: 36 MG/DL (ref 40–75)
HDLC SERPL: 20.7 % (ref 20–50)
HGB BLD-MCNC: 15.7 G/DL (ref 14–18)
IMM GRANULOCYTES # BLD AUTO: 0.02 K/UL (ref 0–0.04)
IMM GRANULOCYTES NFR BLD AUTO: 0.3 % (ref 0–0.5)
LDLC SERPL CALC-MCNC: 97.6 MG/DL (ref 63–159)
LYMPHOCYTES # BLD AUTO: 1.9 K/UL (ref 1–4.8)
LYMPHOCYTES NFR BLD: 26.5 % (ref 18–48)
MCH RBC QN AUTO: 29.5 PG (ref 27–31)
MCHC RBC AUTO-ENTMCNC: 32.6 G/DL (ref 32–36)
MCV RBC AUTO: 90 FL (ref 82–98)
MONOCYTES # BLD AUTO: 0.7 K/UL (ref 0.3–1)
MONOCYTES NFR BLD: 9.8 % (ref 4–15)
NEUTROPHILS # BLD AUTO: 4.4 K/UL (ref 1.8–7.7)
NEUTROPHILS NFR BLD: 60.3 % (ref 38–73)
NONHDLC SERPL-MCNC: 138 MG/DL
NRBC BLD-RTO: 0 /100 WBC
PLATELET # BLD AUTO: 357 K/UL (ref 150–450)
PMV BLD AUTO: 9.8 FL (ref 9.2–12.9)
POTASSIUM SERPL-SCNC: 4 MMOL/L (ref 3.5–5.1)
PROT SERPL-MCNC: 7.4 G/DL (ref 6–8.4)
RBC # BLD AUTO: 5.33 M/UL (ref 4.6–6.2)
SODIUM SERPL-SCNC: 142 MMOL/L (ref 136–145)
TRIGL SERPL-MCNC: 202 MG/DL (ref 30–150)
WBC # BLD AUTO: 7.22 K/UL (ref 3.9–12.7)

## 2023-12-19 PROCEDURE — 1101F PT FALLS ASSESS-DOCD LE1/YR: CPT | Mod: CPTII,S$GLB,, | Performed by: NURSE PRACTITIONER

## 2023-12-19 PROCEDURE — 3079F DIAST BP 80-89 MM HG: CPT | Mod: CPTII,S$GLB,, | Performed by: NURSE PRACTITIONER

## 2023-12-19 PROCEDURE — 1125F PR PAIN SEVERITY QUANTIFIED, PAIN PRESENT: ICD-10-PCS | Mod: CPTII,S$GLB,, | Performed by: NURSE PRACTITIONER

## 2023-12-19 PROCEDURE — 99214 OFFICE O/P EST MOD 30 MIN: CPT | Mod: S$GLB,,, | Performed by: NURSE PRACTITIONER

## 2023-12-19 PROCEDURE — 3074F SYST BP LT 130 MM HG: CPT | Mod: CPTII,S$GLB,, | Performed by: NURSE PRACTITIONER

## 2023-12-19 PROCEDURE — 36415 COLL VENOUS BLD VENIPUNCTURE: CPT | Mod: PO | Performed by: NURSE PRACTITIONER

## 2023-12-19 PROCEDURE — 3288F FALL RISK ASSESSMENT DOCD: CPT | Mod: CPTII,S$GLB,, | Performed by: NURSE PRACTITIONER

## 2023-12-19 PROCEDURE — 85025 COMPLETE CBC W/AUTO DIFF WBC: CPT | Performed by: NURSE PRACTITIONER

## 2023-12-19 PROCEDURE — 80061 LIPID PANEL: CPT | Performed by: NURSE PRACTITIONER

## 2023-12-19 PROCEDURE — 3008F BODY MASS INDEX DOCD: CPT | Mod: CPTII,S$GLB,, | Performed by: NURSE PRACTITIONER

## 2023-12-19 PROCEDURE — 99999 PR PBB SHADOW E&M-EST. PATIENT-LVL IV: CPT | Mod: PBBFAC,,, | Performed by: NURSE PRACTITIONER

## 2023-12-19 PROCEDURE — 80053 COMPREHEN METABOLIC PANEL: CPT | Performed by: NURSE PRACTITIONER

## 2023-12-19 PROCEDURE — 1125F AMNT PAIN NOTED PAIN PRSNT: CPT | Mod: CPTII,S$GLB,, | Performed by: NURSE PRACTITIONER

## 2023-12-19 PROCEDURE — 3074F PR MOST RECENT SYSTOLIC BLOOD PRESSURE < 130 MM HG: ICD-10-PCS | Mod: CPTII,S$GLB,, | Performed by: NURSE PRACTITIONER

## 2023-12-19 PROCEDURE — 1160F RVW MEDS BY RX/DR IN RCRD: CPT | Mod: CPTII,S$GLB,, | Performed by: NURSE PRACTITIONER

## 2023-12-19 PROCEDURE — 3079F PR MOST RECENT DIASTOLIC BLOOD PRESSURE 80-89 MM HG: ICD-10-PCS | Mod: CPTII,S$GLB,, | Performed by: NURSE PRACTITIONER

## 2023-12-19 PROCEDURE — 4010F ACE/ARB THERAPY RXD/TAKEN: CPT | Mod: CPTII,S$GLB,, | Performed by: NURSE PRACTITIONER

## 2023-12-19 PROCEDURE — 99214 PR OFFICE/OUTPT VISIT, EST, LEVL IV, 30-39 MIN: ICD-10-PCS | Mod: S$GLB,,, | Performed by: NURSE PRACTITIONER

## 2023-12-19 PROCEDURE — 1101F PR PT FALLS ASSESS DOC 0-1 FALLS W/OUT INJ PAST YR: ICD-10-PCS | Mod: CPTII,S$GLB,, | Performed by: NURSE PRACTITIONER

## 2023-12-19 PROCEDURE — 1160F PR REVIEW ALL MEDS BY PRESCRIBER/CLIN PHARMACIST DOCUMENTED: ICD-10-PCS | Mod: CPTII,S$GLB,, | Performed by: NURSE PRACTITIONER

## 2023-12-19 PROCEDURE — 1159F PR MEDICATION LIST DOCUMENTED IN MEDICAL RECORD: ICD-10-PCS | Mod: CPTII,S$GLB,, | Performed by: NURSE PRACTITIONER

## 2023-12-19 PROCEDURE — 4010F PR ACE/ARB THEARPY RXD/TAKEN: ICD-10-PCS | Mod: CPTII,S$GLB,, | Performed by: NURSE PRACTITIONER

## 2023-12-19 PROCEDURE — 84153 ASSAY OF PSA TOTAL: CPT | Performed by: NURSE PRACTITIONER

## 2023-12-19 PROCEDURE — 3288F PR FALLS RISK ASSESSMENT DOCUMENTED: ICD-10-PCS | Mod: CPTII,S$GLB,, | Performed by: NURSE PRACTITIONER

## 2023-12-19 PROCEDURE — 3008F PR BODY MASS INDEX (BMI) DOCUMENTED: ICD-10-PCS | Mod: CPTII,S$GLB,, | Performed by: NURSE PRACTITIONER

## 2023-12-19 PROCEDURE — 1159F MED LIST DOCD IN RCRD: CPT | Mod: CPTII,S$GLB,, | Performed by: NURSE PRACTITIONER

## 2023-12-19 PROCEDURE — 99999 PR PBB SHADOW E&M-EST. PATIENT-LVL IV: ICD-10-PCS | Mod: PBBFAC,,, | Performed by: NURSE PRACTITIONER

## 2023-12-19 RX ORDER — INFLUENZA A VIRUS A/VICTORIA/4897/2022 IVR-238 (H1N1) ANTIGEN (FORMALDEHYDE INACTIVATED), INFLUENZA A VIRUS A/DARWIN/9/2021 SAN-010 (H3N2) ANTIGEN (FORMALDEHYDE INACTIVATED), INFLUENZA B VIRUS B/PHUKET/3073/2013 ANTIGEN (FORMALDEHYDE INACTIVATED), AND INFLUENZA B VIRUS B/MICHIGAN/01/2021 ANTIGEN (FORMALDEHYDE INACTIVATED) 60; 60; 60; 60 UG/.7ML; UG/.7ML; UG/.7ML; UG/.7ML
INJECTION, SUSPENSION INTRAMUSCULAR
COMMUNITY
Start: 2023-09-28

## 2023-12-19 NOTE — PROGRESS NOTES
Subjective:       Patient ID: Byron Styles is a 68 y.o. male.    Chief Complaint: Follow-up    Pt presents to clinic today for follow up  Pt with hypertension, hyperlipidemia, depression, and gout  In regards to his chronic issues he relates he is doing well.    He is taking his medications as prescribed without adverse effects.    His depression has been stable with no exacerbations.    He is due for his cscope but still hasn't found a ride  He understands the importance and will try to get this sorted out       /80   Pulse 68   Temp 96.3 °F (35.7 °C)   Wt 108.8 kg (239 lb 13.8 oz)   SpO2 95%   BMI 31.65 kg/m²     Review of Systems   Constitutional:  Negative for activity change and unexpected weight change.   HENT:  Negative for hearing loss, rhinorrhea and trouble swallowing.    Eyes:  Negative for discharge and visual disturbance.   Respiratory:  Negative for chest tightness and wheezing.    Cardiovascular:  Negative for chest pain and palpitations.   Gastrointestinal:  Negative for blood in stool, constipation, diarrhea and vomiting.   Endocrine: Negative for polydipsia and polyuria.   Genitourinary:  Negative for difficulty urinating, hematuria and urgency.   Musculoskeletal:  Positive for arthralgias. Negative for joint swelling and neck pain.   Neurological:  Negative for weakness and headaches.   Psychiatric/Behavioral:  Negative for confusion and dysphoric mood.        Objective:      Physical Exam  Vitals and nursing note reviewed.   Constitutional:       General: He is not in acute distress.     Appearance: He is well-developed. He is not diaphoretic.   HENT:      Head: Normocephalic and atraumatic.   Cardiovascular:      Rate and Rhythm: Normal rate and regular rhythm.      Heart sounds: Normal heart sounds.   Pulmonary:      Effort: Pulmonary effort is normal. No respiratory distress.      Breath sounds: Normal breath sounds.   Skin:     General: Skin is warm and dry.      Findings: No  rash.   Neurological:      Mental Status: He is alert and oriented to person, place, and time.   Psychiatric:         Behavior: Behavior normal.         Thought Content: Thought content normal.         Judgment: Judgment normal.         Assessment:       1. Essential hypertension    2. Mixed hyperlipidemia    3. Gout, unspecified cause, unspecified chronicity, unspecified site    4. Depression, unspecified depression type    5. BMI 31.0-31.9,adult        Plan:       Byron was seen today for follow-up.    Diagnoses and all orders for this visit:    Essential hypertension    Mixed hyperlipidemia    Gout, unspecified cause, unspecified chronicity, unspecified site    Depression, unspecified depression type    BMI 31.0-31.9,adult      overall doing well  Continue meds as prescribed  Let us know when the ride situation works out for cscope  Update labs today  Follow up in 6 months and PRN

## 2024-03-05 ENCOUNTER — TELEPHONE (OUTPATIENT)
Dept: PHARMACY | Facility: CLINIC | Age: 69
End: 2024-03-05
Payer: COMMERCIAL

## 2024-03-28 DIAGNOSIS — M25.551 RIGHT HIP PAIN: ICD-10-CM

## 2024-03-28 DIAGNOSIS — M17.11 ARTHRITIS OF RIGHT KNEE: ICD-10-CM

## 2024-03-28 RX ORDER — MELOXICAM 15 MG/1
TABLET ORAL
Qty: 30 TABLET | Refills: 0 | Status: SHIPPED | OUTPATIENT
Start: 2024-03-28

## 2024-04-12 DIAGNOSIS — E78.5 HYPERLIPIDEMIA: ICD-10-CM

## 2024-04-12 RX ORDER — ATORVASTATIN CALCIUM 40 MG/1
40 TABLET, FILM COATED ORAL NIGHTLY
Qty: 90 TABLET | Refills: 0 | Status: SHIPPED | OUTPATIENT
Start: 2024-04-12

## 2024-04-12 NOTE — TELEPHONE ENCOUNTER
Care Due:                  Date            Visit Type   Department     Provider  --------------------------------------------------------------------------------                                EP -                              PRIMARY      [facility] INTERNAL  Last Visit: 06-      CARE ([facility])   MEDICINE       Ravindra Bernard                              EP -                              PRIMARY      [facility] INTERNAL  Next Visit: 06-      CARE ([facility])   MEDICINE       Ravindra Bernard                                                            Last  Test          Frequency    Reason                     Performed    Due Date  --------------------------------------------------------------------------------    Uric Acid...  12 months..  febuxostat...............  Not Found    Overdue    [employer] Embedded Care Due Messages. Reference number: [id number].   4/12/2024 7:23:24 AM CDT

## 2024-05-02 ENCOUNTER — HOSPITAL ENCOUNTER (OUTPATIENT)
Dept: RADIOLOGY | Facility: HOSPITAL | Age: 69
Discharge: HOME | End: 2024-05-02
Attending: PHYSICIAN ASSISTANT
Payer: COMMERCIAL

## 2024-05-02 ENCOUNTER — OFFICE VISIT (OUTPATIENT)
Dept: ORTHOPEDICS | Facility: CLINIC | Age: 69
End: 2024-05-02
Payer: COMMERCIAL

## 2024-05-02 VITALS — BODY MASS INDEX: 31.79 KG/M2 | HEIGHT: 73 IN | WEIGHT: 239.88 LBS

## 2024-05-02 DIAGNOSIS — M21.162 ACQUIRED VARUS DEFORMITY KNEE, LEFT: ICD-10-CM

## 2024-05-02 DIAGNOSIS — M25.561 RIGHT KNEE PAIN, UNSPECIFIED CHRONICITY: Primary | ICD-10-CM

## 2024-05-02 DIAGNOSIS — M17.0 PRIMARY OSTEOARTHRITIS OF BOTH KNEES: Primary | ICD-10-CM

## 2024-05-02 DIAGNOSIS — M25.561 RIGHT KNEE PAIN, UNSPECIFIED CHRONICITY: ICD-10-CM

## 2024-05-02 DIAGNOSIS — M21.161 ACQUIRED VARUS DEFORMITY KNEE, RIGHT: ICD-10-CM

## 2024-05-02 PROCEDURE — 73562 X-RAY EXAM OF KNEE 3: CPT | Mod: 59,TC,LT

## 2024-05-02 PROCEDURE — 1160F RVW MEDS BY RX/DR IN RCRD: CPT | Mod: CPTII,S$GLB,, | Performed by: PHYSICIAN ASSISTANT

## 2024-05-02 PROCEDURE — 99999 PR PBB SHADOW E&M-EST. PATIENT-LVL III: CPT | Mod: PBBFAC,,, | Performed by: PHYSICIAN ASSISTANT

## 2024-05-02 PROCEDURE — 73564 X-RAY EXAM KNEE 4 OR MORE: CPT | Mod: TC,RT

## 2024-05-02 PROCEDURE — 3288F FALL RISK ASSESSMENT DOCD: CPT | Mod: CPTII,S$GLB,, | Performed by: PHYSICIAN ASSISTANT

## 2024-05-02 PROCEDURE — 1125F AMNT PAIN NOTED PAIN PRSNT: CPT | Mod: CPTII,S$GLB,, | Performed by: PHYSICIAN ASSISTANT

## 2024-05-02 PROCEDURE — 73564 X-RAY EXAM KNEE 4 OR MORE: CPT | Mod: 26,RT,, | Performed by: RADIOLOGY

## 2024-05-02 PROCEDURE — 99214 OFFICE O/P EST MOD 30 MIN: CPT | Mod: S$GLB,,, | Performed by: PHYSICIAN ASSISTANT

## 2024-05-02 PROCEDURE — 1159F MED LIST DOCD IN RCRD: CPT | Mod: CPTII,S$GLB,, | Performed by: PHYSICIAN ASSISTANT

## 2024-05-02 PROCEDURE — 4010F ACE/ARB THERAPY RXD/TAKEN: CPT | Mod: CPTII,S$GLB,, | Performed by: PHYSICIAN ASSISTANT

## 2024-05-02 PROCEDURE — 73562 X-RAY EXAM OF KNEE 3: CPT | Mod: 26,LT,, | Performed by: RADIOLOGY

## 2024-05-02 PROCEDURE — 1101F PT FALLS ASSESS-DOCD LE1/YR: CPT | Mod: CPTII,S$GLB,, | Performed by: PHYSICIAN ASSISTANT

## 2024-05-02 PROCEDURE — 3008F BODY MASS INDEX DOCD: CPT | Mod: CPTII,S$GLB,, | Performed by: PHYSICIAN ASSISTANT

## 2024-05-02 NOTE — PROGRESS NOTES
Subjective:     Patient ID: Byron Styles is a 68 y.o. male.    Chief Complaint: Pain of the Right Knee     10/08/2020  HPI:  65-year-old male who states been having pain in both of his knees for almost 6 years.  Denies any history of trauma.  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  He occasionally takes Aleve may be 3 times a week for a foot problem that he just developed and seen Dr. Gr.  He does have history of gout .  Also had some numbness and neuropathy in his feet recently started by Podiatry on Neurontin 100 mg and that seems to ease things up for him.  Makes him a little bit sleepy and he did not want increase the dose.   pain in the knees are 4/10 worst with activities.  He does give history of Raynaud disease sometimes periphery gets severely cold.  Denies any treatment in the legs in the form of any injections or treatment for arthritis.  Did have an ultrasound to the legs which was negative for DVT recently and that because his feet swell up on him.  Denies any fever or chills or shortness of breath or difficulty with chewing swallowing loss of bowel or bladder control or blurry vision or double vision or chest pain    11/19/2020  Patient stated the meloxicam seems to help helped significantly and not having any pain in his knees anymore.  He is doing really well at this point in time.  He will be finishing taking his gabapentin for his primary care by the end of this prescription since his feet are doing better.  Overall he is doing well.  He is still remaining active at work.  Denies any fever or chills or shortness of breath or difficulty with chewing or swallowing loss of bowel bladder control or blurry vision double vision or loss of sense smell taste  His pain level is 0/10 today and states the meloxicam worked great.  Blood pressure 116/74, pulse 73    12/02/2021  Bilateral knee arthritis.  He was doing really well on the  meloxicam and doing some independent exercises.  He was taken off by primary care because being on anti-inflammatories for long period of time could cause gastrointestinal, liver and kidney disease.  I did tell him taking it twice or 3 times a week should not be a big issue.  We can follow this with blood work every 6 months.  His pain is around 7/10 he has tries to take Tylenol for it.  He does take his medication for gout and his uric acid last evaluation was down to normal at 5.4.  He does ambulate without any assistive devices.  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  He does dabbed with [employer] work and would working on his own.  He does ambulate without any assistive devices.  His pain is 7/10  No fever no chills no shortness of breath or difficulty with chewing or swallowing loss of bowel bladder control      02/10/2022  Bilateral knee severe arthritis and varus deformity.  We gave him injections of 80 mg Depo-Medrol in each knee on 12/02/2021 with great relief of his pain.  Now he is taking meloxicam maybe twice a week occasional Tylenol.  He is ambulating without any assistive devices.  We did get him approved for viscosupplementation/Synvisc-One but at this time since he is doing well we will hold off.  I described Synvisc-One and viscosupplementation to him and I went over his x-rays again with him today.  Long discussion today about total knee replacement and he wanted to ask me all these questions and I brought in the model showed him x-ray about people have it and how it is done and wise it outpatient surgery at we spent quite a bit of time going over the surgery itself.  He is not there at this point he is doing really well his pain level is 1/10.  He wants to avoid surgery right now since he is doing better.    11/28/2022   Bilateral knee arthritis.  Patient states his pain is 3/10 sitting down however when he is very active it goes up to around 6.  He got approved right now to receive  viscosupplementation Synvisc-One.  He is maintaining very active life and he is working full-time right now.  He anticipates to work 5 more years because he feels healthy only taking blood pressure medications.  His knee are hurting him the swell up once in a while.  He does take meloxicam occasionally and Tylenol occasionally.  He wants to start taking baby aspirin and we discussed that in with him.    No fever no chills no shortness of breath or difficulty with chewing swallowing loss of bowel bladder control blurry vision double vision loss sense smell or taste    1/27/2023  Patient presents today with a chief complaint of bilateral knee pain.  Patient states pain is at worst 2/10 affecting activity of daily living and thus his quality of life.  The patient last received bilateral Gel injections 11/28/2022.  He states he has not noticed any difference or improvement to the knee.  He states today the right knee is hurting him more than the left although they take turns.  Additionally he states he is not been using his Mobic daily in fact it has been roughly 2 months since he is taking a pill.  He also states he has Voltaren gel at home which he does not use.    The patient likes to remain active in his day-to-day and notices intermittent swelling.  He would like to do everything to hold off surgery as he would like to be retired before having had the procedures done.    Patient is presently denying any shortness of breath, chest pain, fever/chills, nausea/vomiting, loss of taste or smell, numbness/tingling or sensation changes, loss of bladder or bowel function, loss of taste/smell.     4/20/2023  Patient presents today with chief complaint of bilateral knee pain, left greater than right.  Patient states pain today as a 2/10 presently although can increase as the day goes on.  He still remains quite active and is working both night and day rotating shifts that require high level of activity.  The patient states  pain is increased with use and activity over the course of this day is affecting walking long distances, going from a sitting to standing standing to seated position on level terrain or stairs.  He is not using any devices to assist with ambulation nor is he wearing any knee braces.  Last received Synvisc 1 gel injection 11/28/2022.  He again believes that this has not provided any further relief.  He is taking Mobic although not daily only when a he absolutely needs to.  He has been able to make there was day-to-day but does feel like he is becoming more bowlegged as he is noticing it in his daily routine.  I tried for Xarelto authorization although this has been denied.  The patient inquires about repeating short-acting steroid today.    Patient is presently denying any shortness of breath, chest pain, fever/chills, nausea/vomiting, loss of taste or smell, numbness/tingling or sensation changes, loss of bladder or bowel function, loss of taste/smell.     8/10/2023  Patient presents with chief complaint of bilateral knee pain, left greater than right.  The patient states pain is at worst a 4/10 affecting activity of daily living and thus his quality of life.  He states the steroid injection he received in April is doing quite well.  He prefers this as opposed to the gel injections he received November of last year.  He states pain still with use and activity over the course of the day but nothing he can not handle with his present .  He is not using any devices to assist in ambulation nor is he wearing any knee braces.  He remains quite active in his daily routine which he states helps.  He has starting to take the Mobic more often throughout the week or else he feels stiffness increasing.  We discussed adding topical pain gel such as Voltaren or Biofreeze to his regimen as well.    Patient is presently denying any shortness of breath, chest pain, fever/chills, nausea/vomiting, loss of taste or smell,  numbness/tingling or sensation changes, loss of bladder or bowel function, loss of taste/smell.     5/2/2024    Patient presents today for bilateral knee pain, right greater than left.  The patient last received a left knee steroid injection on 04/20/2023.  Presents today wanting to discuss total knee replacement as he wishes to move forward with right knee surgery before the end of the year.  He has not using any devices to assist with ambulation nor is he wearing any knee braces.  States he is tried all the injections and although they work they are only temporary fixes he is ready for more permanent solution.  He denies any trauma or falls.  He has not presently in a formal PT program but does remain quite active in his daily routine.  He is fully independent of ADLs and drives himself, he still works night shifts as well.    Patient is presently denying any shortness of breath, chest pain, fever/chills, nausea/vomiting, loss of taste or smell, numbness/tingling or sensation changes, loss of bladder or bowel function, loss of taste/smell.       Past Medical History:   Diagnosis Date    Colon polyps 2009    Depression     Hyperlipidemia     Hypertension     Localized osteoarthrosis not specified whether primary or secondary, lower leg 4/6/2015    Raynaud's disease     Skin cancer of arm, left 05/2020    [facility]     Past Surgical History:   Procedure Laterality Date    COLONOSCOPY W/ POLYPECTOMY  2009    MOUTH SURGERY      WISDOM TOOTH EXTRACTION       Family History   Problem Relation Name Age of Onset    Heart disease Father  65        CABG    Cancer Father          Bladder    Heart disease Brother  68        CABG    Depression Mother      Heart disease Mother          CHF     Social History     Socioeconomic History    Marital status: Single    Number of children: 0   Occupational History    Occupation:      Employer: [employer]   Tobacco Use    Smoking  status: Never    Smokeless tobacco: Current     Types: Chew   Substance and Sexual Activity    Alcohol use: Yes     Comment: social/ occassional    Drug use: No    Sexual activity: Not Currently     Social Determinants of Health     Financial Resource Strain: Low Risk  (12/14/2023)    Overall Financial Resource Strain (CARDIA)     Difficulty of Paying Living Expenses: Not hard at all   Food Insecurity: No Food Insecurity (12/14/2023)    Hunger Vital Sign     Worried About Running Out of Food in the Last Year: Never true     Ran Out of Food in the Last Year: Never true   Transportation Needs: No Transportation Needs (12/14/2023)    PRAPARE - Transportation     Lack of Transportation (Medical): No     Lack of Transportation (Non-Medical): No   Physical Activity: Unknown (12/14/2023)    Exercise Vital Sign     Days of Exercise per Week: 0 days   Stress: No Stress Concern Present (12/14/2023)    [place] [place] [employer] - Occupational Stress Questionnaire     Feeling of Stress : Not at all   Housing Stability: Low Risk  (12/14/2023)    Housing Stability Vital Sign     Unable to Pay for Housing in the Last Year: No     Number of Places Lived in the Last Year: 1     Unstable Housing in the Last Year: No     Medication List with Changes/Refills   Current Medications    ASPIRIN (ECOTRIN) 81 MG EC TABLET    Take 81 mg by mouth once daily.    ATORVASTATIN (LIPITOR) 40 MG TABLET    TAKE 1 TABLET BY MOUTH ONCE DAILY IN THE EVENING    FEBUXOSTAT (ULORIC) 40 MG TAB    Take 1 tablet by mouth once daily    FLUZONE HIGHDOSE QUAD 23-24  MCG/0.7 ML SYRG        GABAPENTIN (NEURONTIN) 100 MG CAPSULE    Take 100 mg by mouth 2 (two) times daily.    LISINOPRIL-HYDROCHLOROTHIAZIDE (PRINZIDE,ZESTORETIC) 20-12.5 MG PER TABLET    Take 1 tablet by mouth once daily    MELOXICAM (MOBIC) 15 MG TABLET    Take 1 tablet by mouth once daily    MULTIVITAMIN CAPSULE    Take 1 capsule by mouth once daily.    NADOLOL (CORGARD) 20 MG  TABLET    Take 1 tablet by mouth once daily    SERTRALINE (ZOLOFT) 100 MG TABLET    Take 1 tablet by mouth once daily     Review of patient's allergies indicates:  No Known Allergies  Review of Systems   Constitutional: Negative for decreased appetite.   HENT:  Negative for tinnitus.    Eyes:  Negative for double vision.   Cardiovascular:  Negative for chest pain.   Respiratory:  Negative for wheezing.    Hematologic/Lymphatic: Negative for bleeding problem.   Skin:  Negative for dry skin.   Musculoskeletal:  Positive for joint pain and stiffness. Negative for arthritis, back pain and neck pain.   Gastrointestinal:  Negative for abdominal pain.   Genitourinary:  Negative for bladder incontinence.   Neurological:  Negative for numbness, paresthesias and sensory change.   Psychiatric/Behavioral:  Negative for altered mental status.        Objective:   Body mass index is 31.65 kg/m².  There were no vitals filed for this visit.         General    Constitutional: He is oriented to person, place, and time. He appears well-developed.   HENT:   Head: Atraumatic.   Eyes: EOM are normal.   Cardiovascular:  Normal rate.            Pulmonary/Chest: Effort normal.   Neurological: He is alert and oriented to person, place, and time.   Psychiatric: Judgment normal.             Left knee with passive range of motion of 5-120   Moderate varus deformity   crepitus to compression on the patella.    Collaterals and cruciates are stable.    No defect in the quadriceps or patella tendon  No edema present  Calves are soft, nontender  DF/PF intact  DP 1+  Skin is warm to touch no obvious lesions    Right knee with passive range of motion of 5-100  Moderate varus deformity noted   crepitus to compression on the patella.    Collaterals and cruciates are stable.    No defect in the quadriceps or patella tendon  No edema present  Calves are soft, nontender  DF/PF intact  DP 1+  Skin is warm to touch no obvious lesions    Xray knee:    FINDINGS:  Similar bilateral tricompartmental degenerative findings, most prevalent within the medial compartments with significant joint space narrowing.  No acute osseous abnormality.  No large joint effusion.     Impression:     Similar bilateral degenerative findings  Kellegren 4     Assessment:     No diagnosis found.       Plan:   There are no diagnoses linked to this encounter.       There are no Patient Instructions on file for this visit.  The patient presents as a new patient to me today with chief complaint of bilateral knee pain, right greater than left. We had a long discussion today regarding degenerative arthritis in the knees. The patient understands that arthritis is chronic and will worsen over time.  The patient also understands that arthritis may cause episodic flare-ups in pain. Management or if arthritis is achieved through a multi-modal approach including weight loss in obese individuals, activity modification, NSAIDs (topical vs oral) where appropriate, periodic intra-articular steroid injections, viscosupplementation, physical therapy, knee bracing, ambulatory aids, as well as geniculate nerve blocks.  The patient presented today to discuss right total knee replacement.  We discussed preprocedure, postprocedure, procedural ongoing, postop care.  All questions answered today.  We did discuss the need to obtain clearance from our cardiology department we will establish this appointment for him today.  I would like for him to see Dr. Hutson once clearance has been obtained.  He may call with any questions or concerns.    Right total knee replacement is considered outpatient surgery by the government right now.  You will have your surgery under spinal or general anesthetic.    It will take roughly an hour and half to 2 to perform.  You will be going home the same day after surgery.    We will get you up and walking an hour or so after surgery in recovery room and will arrange for home  health come to her house by the next day.    You will receive home health and home physical therapy for 2 weeks and then outpatient after that.    It will take roughly 3-6 months for complete healing to occur; continued improvement seen up to 18 months    Because you have to go home the same day I will not operate on total knees unless a cardiologist evaluates you to make sure your heart is okay.    You will also be going to a class at the hospital prior to surgery and you will meet with the therapist and home health etcetera- called bootcamp      Procedure, common risks and benefits,alternatives discussed in details.  All questions answered.  Patient expressed understanding.  Patient instructed to call for any questions that could arise in the future.     Most common Risks:  Infection/2%  Leg-length discrepancy/most common with total hip replacement  Dislocation/most common with total hip replacement  Neuro-vascular injury ( resulting in loss of motor and sensory functions) because you are knock kneed you have a slight increase risk of having what we call peroneal nerve palsy which results in a footdrop.  Most foot drops around 70% recuperate within 6 months.  Almost all people with total knee replacement I slightly numb on the outside of the knee  Pain  Blood clot  Fat clot  Loss of motion.  Total knees have the tendency to lose motion if you do not exercise and do therapy.  You have to work through the pain to achieve motion  Fracture of bone  Failure of procedure to achieve its intended purpose.  Total knees are 80% successful in decreasing pain increasing function  Failure of hardware/they last approximately 15 years  Non-union or mal-union of bone  Malalignment  Death/you go see cardiologist before surgery    Disclaimer: This note was prepared using a voice recognition system and is likely to have sound alike errors within the text.

## 2024-05-10 DIAGNOSIS — I10 ESSENTIAL HYPERTENSION: Primary | ICD-10-CM

## 2024-05-15 ENCOUNTER — HOSPITAL ENCOUNTER (OUTPATIENT)
Dept: CARDIOLOGY | Facility: HOSPITAL | Age: 69
Discharge: HOME | End: 2024-05-15
Attending: INTERNAL MEDICINE
Payer: COMMERCIAL

## 2024-05-15 ENCOUNTER — OFFICE VISIT (OUTPATIENT)
Dept: CARDIOLOGY | Facility: CLINIC | Age: 69
End: 2024-05-15
Payer: COMMERCIAL

## 2024-05-15 VITALS
BODY MASS INDEX: 31.59 KG/M2 | HEART RATE: 72 BPM | WEIGHT: 238.31 LBS | DIASTOLIC BLOOD PRESSURE: 72 MMHG | SYSTOLIC BLOOD PRESSURE: 109 MMHG | RESPIRATION RATE: 16 BRPM | OXYGEN SATURATION: 98 % | HEIGHT: 73 IN

## 2024-05-15 DIAGNOSIS — I10 ESSENTIAL HYPERTENSION: ICD-10-CM

## 2024-05-15 DIAGNOSIS — E78.5 HYPERLIPIDEMIA, UNSPECIFIED HYPERLIPIDEMIA TYPE: ICD-10-CM

## 2024-05-15 DIAGNOSIS — I10 PRIMARY HYPERTENSION: ICD-10-CM

## 2024-05-15 DIAGNOSIS — Z82.49 FAMILY HISTORY OF CABG: ICD-10-CM

## 2024-05-15 DIAGNOSIS — M19.90 OSTEOARTHRITIS, UNSPECIFIED OSTEOARTHRITIS TYPE, UNSPECIFIED SITE: ICD-10-CM

## 2024-05-15 DIAGNOSIS — Z01.810 PREOP CARDIOVASCULAR EXAM: Primary | ICD-10-CM

## 2024-05-15 PROCEDURE — 1101F PT FALLS ASSESS-DOCD LE1/YR: CPT | Mod: CPTII,S$GLB,, | Performed by: INTERNAL MEDICINE

## 2024-05-15 PROCEDURE — 93005 ELECTROCARDIOGRAM TRACING: CPT

## 2024-05-15 PROCEDURE — 3074F SYST BP LT 130 MM HG: CPT | Mod: CPTII,S$GLB,, | Performed by: INTERNAL MEDICINE

## 2024-05-15 PROCEDURE — 3288F FALL RISK ASSESSMENT DOCD: CPT | Mod: CPTII,S$GLB,, | Performed by: INTERNAL MEDICINE

## 2024-05-15 PROCEDURE — 3078F DIAST BP <80 MM HG: CPT | Mod: CPTII,S$GLB,, | Performed by: INTERNAL MEDICINE

## 2024-05-15 PROCEDURE — 99204 OFFICE O/P NEW MOD 45 MIN: CPT | Mod: S$GLB,,, | Performed by: INTERNAL MEDICINE

## 2024-05-15 PROCEDURE — 3008F BODY MASS INDEX DOCD: CPT | Mod: CPTII,S$GLB,, | Performed by: INTERNAL MEDICINE

## 2024-05-15 PROCEDURE — 99999 PR PBB SHADOW E&M-EST. PATIENT-LVL III: CPT | Mod: PBBFAC,,, | Performed by: INTERNAL MEDICINE

## 2024-05-15 PROCEDURE — 93010 ELECTROCARDIOGRAM REPORT: CPT | Mod: ,,, | Performed by: INTERNAL MEDICINE

## 2024-05-15 PROCEDURE — 1159F MED LIST DOCD IN RCRD: CPT | Mod: CPTII,S$GLB,, | Performed by: INTERNAL MEDICINE

## 2024-05-15 PROCEDURE — 4010F ACE/ARB THERAPY RXD/TAKEN: CPT | Mod: CPTII,S$GLB,, | Performed by: INTERNAL MEDICINE

## 2024-05-15 NOTE — PROGRESS NOTES
Subjective:   Patient ID:  Byron Styles is a 68 y.o. male who presents for evaluation of No chief complaint on file.      HPI  5.15.2024   68-year-old male never smoker,  family history of CAD, brother and father had both CABG.  Goes up the \[employer]\"" stadium no chest pain  , limited with his knee.  Works in his yard    No chest pain   Normal calcium score 2014 was zero   Never smoker     Denies dyspnea on exertion.  He has  remote history of palpitations treated with nadolol and controlled and resolved    Past Medical History:   Diagnosis Date    Colon polyps 2009    Depression     Hyperlipidemia     Hypertension     Localized osteoarthrosis not specified whether primary or secondary, lower leg 4/6/2015    Raynaud's disease     Skin cancer of arm, left 05/2020    [facility]       Past Surgical History:   Procedure Laterality Date    COLONOSCOPY W/ POLYPECTOMY  2009    MOUTH SURGERY      WISDOM TOOTH EXTRACTION         Social History     Tobacco Use    Smoking status: Never    Smokeless tobacco: Current     Types: Chew   Substance Use Topics    Alcohol use: Yes     Comment: social/ occassional    Drug use: No       Family History   Problem Relation Name Age of Onset    Heart disease Father  65        CABG    Cancer Father          Bladder    Heart disease Brother  68        CABG    Depression Mother      Heart disease Mother          CHF       Review of Systems   Cardiovascular:  Negative for chest pain, dyspnea on exertion, palpitations and syncope.   Musculoskeletal:  Positive for arthritis.   Genitourinary: Negative.    Neurological: Negative.        Current Outpatient Medications on File Prior to Visit   Medication Sig    aspirin (ECOTRIN) 81 MG EC tablet Take 81 mg by mouth once daily.    atorvastatin (LIPITOR) 40 MG tablet TAKE 1 TABLET BY MOUTH ONCE DAILY IN THE EVENING    febuxostat (ULORIC) 40 mg Tab Take 1 tablet by mouth once daily    FLUZONE HIGHDOSE QUAD 23-24  mcg/0.7 mL Syrg      gabapentin (NEURONTIN) 100 MG capsule Take 100 mg by mouth 2 (two) times daily.    lisinopriL-hydrochlorothiazide (PRINZIDE,ZESTORETIC) 20-12.5 mg per tablet Take 1 tablet by mouth once daily    meloxicam (MOBIC) 15 MG tablet Take 1 tablet by mouth once daily    multivitamin capsule Take 1 capsule by mouth once daily.    nadoloL (CORGARD) 20 MG tablet Take 1 tablet by mouth once daily    sertraline (ZOLOFT) 100 MG tablet Take 1 tablet by mouth once daily     No current facility-administered medications on file prior to visit.       Objective:   Objective:  Wt Readings from Last 3 Encounters:   05/15/24 108.1 kg (238 lb 5.1 oz)   05/02/24 108.8 kg (239 lb 13.8 oz)   12/19/23 108.8 kg (239 lb 13.8 oz)     Temp Readings from Last 3 Encounters:   12/19/23 96.3 °F (35.7 °C)   08/30/22 98.4 °F (36.9 °C) (Temporal)   08/02/22 97.5 °F (36.4 °C) (Temporal)     BP Readings from Last 3 Encounters:   05/15/24 109/72   12/19/23 104/80   06/15/23 112/78     Pulse Readings from Last 3 Encounters:   05/15/24 72   12/19/23 68   06/15/23 63       Physical Exam  Vitals reviewed.   Constitutional:       Appearance: He is well-developed.   Neck:      Vascular: No carotid bruit.   Cardiovascular:      Rate and Rhythm: Normal rate and regular rhythm.      Pulses: Intact distal pulses.      Heart sounds: Normal heart sounds. No murmur heard.  Pulmonary:      Breath sounds: Normal breath sounds.   Neurological:      Mental Status: He is oriented to person, place, and time.         Lab Results   Component Value Date    CHOL 174 12/19/2023    CHOL 176 08/30/2022    CHOL 150 08/26/2021     Lab Results   Component Value Date    HDL 36 (L) 12/19/2023    HDL 38 (L) 08/30/2022    HDL 38 (L) 08/26/2021     Lab Results   Component Value Date    LDLCALC 97.6 12/19/2023    LDLCALC 100.2 08/30/2022    LDLCALC 77.8 08/26/2021     Lab Results   Component Value Date    TRIG 202 (H) 12/19/2023    TRIG 189 (H) 08/30/2022    TRIG 171 (H) 08/26/2021     Lab  "Results   Component Value Date    CHOLHDL 20.7 12/19/2023    CHOLHDL 21.6 08/30/2022    CHOLHDL 25.3 08/26/2021       Chemistry        Component Value Date/Time     12/19/2023 1112    K 4.0 12/19/2023 1112     12/19/2023 1112    CO2 29 12/19/2023 1112    BUN 14 12/19/2023 1112    CREATININE 1.0 12/19/2023 1112    GLU 88 12/19/2023 1112        Component Value Date/Time    CALCIUM 9.5 12/19/2023 1112    ALKPHOS 84 12/19/2023 1112    AST 27 12/19/2023 1112    ALT 31 12/19/2023 1112    BILITOT 1.1 (H) 12/19/2023 1112    ESTGFRAFRICA >60.0 08/26/2021 1204    EGFRNONAA >60.0 08/26/2021 1204          No results found for: "TSH"  No results found for: "INR", "PROTIME"  Lab Results   Component Value Date    WBC 7.22 12/19/2023    HGB 15.7 12/19/2023    HCT 48.2 12/19/2023    MCV 90 12/19/2023     12/19/2023     BNP  @LABRCNTIP(BNP,BNPTRIAGEBLO)@  CrCl cannot be calculated (Patient's most recent lab result is older than the maximum 7 days allowed.).     Imaging:  ======    No results found for this or any previous visit.    No results found for this or any previous visit.    No results found for this or any previous visit.    No results found for this or any previous visit.    No valid procedures specified.    No results found for this or any previous visit.      No results found for this or any previous visit.      No results found for this or any previous visit.      Diagnostic Results:  ECG: Reviewed    The 10-year ASCVD risk score (David THOMSON, et al., 2019) is: 15.2%    Values used to calculate the score:      Age: 68 years      Sex: Male      Is Non- : No      Diabetic: No      Tobacco smoker: No      Systolic Blood Pressure: 109 mmHg      Is BP treated: Yes      HDL Cholesterol: 36 mg/dL      Total Cholesterol: 174 mg/dL        Assessment and Plan:   Preop cardiovascular exam    Primary hypertension    Hyperlipidemia, unspecified hyperlipidemia type    Family history of " CABG    Osteoarthritis, unspecified osteoarthritis type, unspecified site    BMI 31.0-31.9,adult      Normal EKG.  Good functional status.   METS more than 4.   Stable and okay from cardiac standpoint to undergo his right knee surgery  Reviewed all tests and above medical conditions with patient in detail and formulated treatment plan.  Risk factor modification discussed.   Cardiac low salt diet discussed.  Maintaining healthy weight and weight loss goals were discussed in clinic.    Follow up in  6 months

## 2024-05-16 LAB
OHS QRS DURATION: 96 MS
OHS QTC CALCULATION: 455 MS

## 2024-06-18 ENCOUNTER — OFFICE VISIT (OUTPATIENT)
Dept: INTERNAL MEDICINE | Facility: CLINIC | Age: 69
End: 2024-06-18
Payer: COMMERCIAL

## 2024-06-18 VITALS
HEART RATE: 62 BPM | OXYGEN SATURATION: 97 % | SYSTOLIC BLOOD PRESSURE: 100 MMHG | WEIGHT: 237.63 LBS | DIASTOLIC BLOOD PRESSURE: 78 MMHG | BODY MASS INDEX: 31.35 KG/M2

## 2024-06-18 DIAGNOSIS — R42 VERTIGO: ICD-10-CM

## 2024-06-18 DIAGNOSIS — I10 ESSENTIAL HYPERTENSION: Primary | ICD-10-CM

## 2024-06-18 DIAGNOSIS — E78.2 MIXED HYPERLIPIDEMIA: ICD-10-CM

## 2024-06-18 DIAGNOSIS — H93.19 TINNITUS, UNSPECIFIED LATERALITY: ICD-10-CM

## 2024-06-18 PROCEDURE — 1159F MED LIST DOCD IN RCRD: CPT | Mod: CPTII,S$GLB,, | Performed by: NURSE PRACTITIONER

## 2024-06-18 PROCEDURE — 3288F FALL RISK ASSESSMENT DOCD: CPT | Mod: CPTII,S$GLB,, | Performed by: NURSE PRACTITIONER

## 2024-06-18 PROCEDURE — 3078F DIAST BP <80 MM HG: CPT | Mod: CPTII,S$GLB,, | Performed by: NURSE PRACTITIONER

## 2024-06-18 PROCEDURE — 1160F RVW MEDS BY RX/DR IN RCRD: CPT | Mod: CPTII,S$GLB,, | Performed by: NURSE PRACTITIONER

## 2024-06-18 PROCEDURE — 4010F ACE/ARB THERAPY RXD/TAKEN: CPT | Mod: CPTII,S$GLB,, | Performed by: NURSE PRACTITIONER

## 2024-06-18 PROCEDURE — 99999 PR PBB SHADOW E&M-EST. PATIENT-LVL III: CPT | Mod: PBBFAC,,, | Performed by: NURSE PRACTITIONER

## 2024-06-18 PROCEDURE — 3074F SYST BP LT 130 MM HG: CPT | Mod: CPTII,S$GLB,, | Performed by: NURSE PRACTITIONER

## 2024-06-18 PROCEDURE — 1101F PT FALLS ASSESS-DOCD LE1/YR: CPT | Mod: CPTII,S$GLB,, | Performed by: NURSE PRACTITIONER

## 2024-06-18 PROCEDURE — 1126F AMNT PAIN NOTED NONE PRSNT: CPT | Mod: CPTII,S$GLB,, | Performed by: NURSE PRACTITIONER

## 2024-06-18 PROCEDURE — 99214 OFFICE O/P EST MOD 30 MIN: CPT | Mod: S$GLB,,, | Performed by: NURSE PRACTITIONER

## 2024-06-18 PROCEDURE — 3008F BODY MASS INDEX DOCD: CPT | Mod: CPTII,S$GLB,, | Performed by: NURSE PRACTITIONER

## 2024-06-18 RX ORDER — FLUOROURACIL 50 MG/G
CREAM TOPICAL 2 TIMES DAILY
COMMUNITY
Start: 2024-06-13

## 2024-06-18 NOTE — PROGRESS NOTES
Subjective:       Patient ID: Byron Styles is a 69 y.o. male.    Chief Complaint: follow up     Pt presents to clinic today for follow up  Pt with hypertension, hyperlipidemia, depression, and gout  In regards to his chronic issues he relates he is doing well.    He is taking his medications as prescribed without adverse effects.    His depression has been stable with no exacerbations.     He is due for his cscope but still hasn't found a ride  He understands the importance and will try to get this sorted out   Unfortunately his initial colonoscopy did have a couple of polyps on it so he is not a candidate for Cologuard or fit kit testing.      Having an ongoing issue with ringing in his ears  Reports he has had it since it was a child   Overall does not bother him however every once in a while it causes some vertigo  We discussed ENT however he isn't interested in this at this time       /78   Pulse 62   Wt 107.8 kg (237 lb 10.5 oz)   SpO2 97%   BMI 31.35 kg/m²     Review of Systems   Constitutional:  Negative for activity change and unexpected weight change.   HENT:  Negative for hearing loss, rhinorrhea and trouble swallowing.    Eyes:  Negative for discharge and visual disturbance.   Respiratory:  Negative for chest tightness and wheezing.    Cardiovascular:  Negative for chest pain and palpitations.   Gastrointestinal:  Negative for blood in stool, constipation, diarrhea and vomiting.   Endocrine: Negative for polydipsia and polyuria.   Genitourinary:  Negative for difficulty urinating, hematuria and urgency.   Musculoskeletal:  Positive for arthralgias. Negative for joint swelling and neck pain.   Neurological:  Negative for weakness and headaches.   Psychiatric/Behavioral:  Negative for confusion and dysphoric mood.        Objective:      Physical Exam  Vitals and nursing note reviewed.   Constitutional:       General: He is not in acute distress.     Appearance: He is well-developed. He is not  "diaphoretic.   HENT:      Head: Normocephalic and atraumatic.      Right Ear: Tympanic membrane normal.      Left Ear: Tympanic membrane normal.   Cardiovascular:      Rate and Rhythm: Normal rate and regular rhythm.      Heart sounds: Normal heart sounds.   Pulmonary:      Effort: Pulmonary effort is normal. No respiratory distress.      Breath sounds: Normal breath sounds.   Skin:     General: Skin is warm and dry.      Findings: No rash.   Neurological:      Mental Status: He is alert and oriented to person, place, and time.   Psychiatric:         Behavior: Behavior normal.         Thought Content: Thought content normal.         Judgment: Judgment normal.         Assessment:       1. Essential hypertension    2. Mixed hyperlipidemia    3. Tinnitus, unspecified laterality    4. Vertigo    5. BMI 31.0-31.9,adult        Plan:       Byron Be" was seen today for follow up .    Diagnoses and all orders for this visit:    Essential hypertension    - Chronic, stable on current meds. Continue   Mixed hyperlipidemia    - Chronic, stable on current meds. Continue   Tinnitus, unspecified laterality  Vertigo     - ENT discussed, deferred for now   BMI 31.0-31.9,adult      Continue meds as prescribed  Follow up in 6 months and PRN  Discussed cscope again- no ride  Pneumonia, shingles vaccines at pharmacy    "

## 2024-06-24 ENCOUNTER — OFFICE VISIT (OUTPATIENT)
Dept: ORTHOPEDICS | Facility: CLINIC | Age: 69
End: 2024-06-24
Payer: COMMERCIAL

## 2024-06-24 VITALS — HEIGHT: 73 IN | WEIGHT: 238.56 LBS | BODY MASS INDEX: 31.62 KG/M2

## 2024-06-24 DIAGNOSIS — M21.162 ACQUIRED VARUS DEFORMITY KNEE, LEFT: ICD-10-CM

## 2024-06-24 DIAGNOSIS — M17.12 ARTHRITIS OF KNEE, LEFT: ICD-10-CM

## 2024-06-24 DIAGNOSIS — M17.11 ARTHRITIS OF KNEE, RIGHT: Primary | ICD-10-CM

## 2024-06-24 DIAGNOSIS — M21.161 ACQUIRED VARUS DEFORMITY KNEE, RIGHT: ICD-10-CM

## 2024-06-24 PROCEDURE — 99214 OFFICE O/P EST MOD 30 MIN: CPT | Mod: S$GLB,,, | Performed by: ORTHOPAEDIC SURGERY

## 2024-06-24 PROCEDURE — 1159F MED LIST DOCD IN RCRD: CPT | Mod: CPTII,S$GLB,, | Performed by: ORTHOPAEDIC SURGERY

## 2024-06-24 PROCEDURE — 3008F BODY MASS INDEX DOCD: CPT | Mod: CPTII,S$GLB,, | Performed by: ORTHOPAEDIC SURGERY

## 2024-06-24 PROCEDURE — 99999 PR PBB SHADOW E&M-EST. PATIENT-LVL III: CPT | Mod: PBBFAC,,, | Performed by: ORTHOPAEDIC SURGERY

## 2024-06-24 PROCEDURE — 3288F FALL RISK ASSESSMENT DOCD: CPT | Mod: CPTII,S$GLB,, | Performed by: ORTHOPAEDIC SURGERY

## 2024-06-24 PROCEDURE — 1125F AMNT PAIN NOTED PAIN PRSNT: CPT | Mod: CPTII,S$GLB,, | Performed by: ORTHOPAEDIC SURGERY

## 2024-06-24 PROCEDURE — 4010F ACE/ARB THERAPY RXD/TAKEN: CPT | Mod: CPTII,S$GLB,, | Performed by: ORTHOPAEDIC SURGERY

## 2024-06-24 PROCEDURE — 1101F PT FALLS ASSESS-DOCD LE1/YR: CPT | Mod: CPTII,S$GLB,, | Performed by: ORTHOPAEDIC SURGERY

## 2024-06-24 NOTE — PROGRESS NOTES
Subjective:     Patient ID: Byron Styles is a 69 y.o. male.    Chief Complaint: Pain of the Left Knee and Pain of the Right Knee     10/08/2020  HPI:  65-year-old male who states been having pain in both of his knees for almost 6 years.  Denies any history of trauma.  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  He occasionally takes Aleve may be 3 times a week for a foot problem that he just developed and seen Dr. Gr.  He does have history of gout .  Also had some numbness and neuropathy in his feet recently started by Podiatry on Neurontin 100 mg and that seems to ease things up for him.  Makes him a little bit sleepy and he did not want increase the dose.   pain in the knees are 4/10 worst with activities.  He does give history of Raynaud disease sometimes periphery gets severely cold.  Denies any treatment in the legs in the form of any injections or treatment for arthritis.  Did have an ultrasound to the legs which was negative for DVT recently and that because his feet swell up on him.  Denies any fever or chills or shortness of breath or difficulty with chewing swallowing loss of bowel or bladder control or blurry vision or double vision or chest pain    11/19/2020  Patient stated the meloxicam seems to help helped significantly and not having any pain in his knees anymore.  He is doing really well at this point in time.  He will be finishing taking his gabapentin for his primary care by the end of this prescription since his feet are doing better.  Overall he is doing well.  He is still remaining active at work.  Denies any fever or chills or shortness of breath or difficulty with chewing or swallowing loss of bowel bladder control or blurry vision double vision or loss of sense smell taste  His pain level is 0/10 today and states the meloxicam worked great.  Blood pressure 116/74, pulse 73    12/02/2021  Bilateral knee arthritis.  He was  doing really well on the meloxicam and doing some independent exercises.  He was taken off by primary care because being on anti-inflammatories for long period of time could cause gastrointestinal, liver and kidney disease.  I did tell him taking it twice or 3 times a week should not be a big issue.  We can follow this with blood work every 6 months.  His pain is around 7/10 he has tries to take Tylenol for it.  He does take his medication for gout and his uric acid last evaluation was down to normal at 5.4.  He does ambulate without any assistive devices.  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  He does dabbed with [employer] work and would working on his own.  He does ambulate without any assistive devices.  His pain is 7/10  No fever no chills no shortness of breath or difficulty with chewing or swallowing loss of bowel bladder control      02/10/2022  Bilateral knee severe arthritis and varus deformity.  We gave him injections of 80 mg Depo-Medrol in each knee on 12/02/2021 with great relief of his pain.  Now he is taking meloxicam maybe twice a week occasional Tylenol.  He is ambulating without any assistive devices.  We did get him approved for viscosupplementation/Synvisc-One but at this time since he is doing well we will hold off.  I described Synvisc-One and viscosupplementation to him and I went over his x-rays again with him today.  Long discussion today about total knee replacement and he wanted to ask me all these questions and I brought in the model showed him x-ray about people have it and how it is done and wise it outpatient surgery at we spent quite a bit of time going over the surgery itself.  He is not there at this point he is doing really well his pain level is 1/10.  He wants to avoid surgery right now since he is doing better.    11/28/2022   Bilateral knee arthritis.  Patient states his pain is 3/10 sitting down however when he is very active it goes up to around 6.  He got approved  right now to receive viscosupplementation Synvisc-One.  He is maintaining very active life and he is working full-time right now.  He anticipates to work 5 more years because he feels healthy only taking blood pressure medications.  His knee are hurting him the swell up once in a while.  He does take meloxicam occasionally and Tylenol occasionally.  He wants to start taking baby aspirin and we discussed that in with him.    No fever no chills no shortness of breath or difficulty with chewing swallowing loss of bowel bladder control blurry vision double vision loss sense smell or taste    06/24/2024   Bilateral knee severe arthritis.  He wants his right knee replaced.  Failed non operative treatments from injections of steroid to viscosupplementation.  He has not retired however now all of his work at is sitting down inside.  He thinks he can undergo his knee replacement.  He is main concern staying out of work for 3 months I did tell him if he has short term disability insurance we can fill that out.  If he is ready to return sooner than 3 months we will let him return.  Went over the total knee replacement in details with him the pros and cons as well as how it looks like on the x-rays.  He does not have relatives here in town but he does have a sister and a be the [place] that could help him for the 1st 2-3 weeks after surgery.  Pain is around 2 to 3/10 sitting down when he over does it it becomes a little bit higher around 5/10.  He takes Tylenol as needed.  Had tried different NSAIDs including meloxicam in the past.  Also has history of gout.  At 1 point he was on gabapentin 100 mg you still have some at home.  Discussed that in details with the MP  No fever no chills no shortness of breath or difficulty with chewing swallowing loss of bowel bladder control blurry vision double vision loss sense smell or taste  Past Medical History:   Diagnosis Date    Colon polyps 2009    Depression     Hyperlipidemia      Hypertension     Localized osteoarthrosis not specified whether primary or secondary, lower leg 4/6/2015    Raynaud's disease     Skin cancer of arm, left 05/2020    [facility]     Past Surgical History:   Procedure Laterality Date    COLONOSCOPY W/ POLYPECTOMY  2009    MOUTH SURGERY      WISDOM TOOTH EXTRACTION       Family History   Problem Relation Name Age of Onset    Heart disease Father  65        CABG    Cancer Father          Bladder    Heart disease Brother  68        CABG    Depression Mother      Heart disease Mother          CHF     Social History     Socioeconomic History    Marital status: Single    Number of children: 0   Occupational History    Occupation:      Employer: [employer]   Tobacco Use    Smoking status: Never     Passive exposure: Never    Smokeless tobacco: Current     Types: Chew   Substance and Sexual Activity    Alcohol use: Yes     Comment: social/ occassional    Drug use: No    Sexual activity: Not Currently     Social Determinants of Health     Financial Resource Strain: Low Risk  (12/14/2023)    Overall Financial Resource Strain (CARDIA)     Difficulty of Paying Living Expenses: Not hard at all   Food Insecurity: No Food Insecurity (12/14/2023)    Hunger Vital Sign     Worried About Running Out of Food in the Last Year: Never true     Ran Out of Food in the Last Year: Never true   Transportation Needs: No Transportation Needs (12/14/2023)    PRAPARE - Transportation     Lack of Transportation (Medical): No     Lack of Transportation (Non-Medical): No   Physical Activity: Unknown (12/14/2023)    Exercise Vital Sign     Days of Exercise per Week: 0 days   Stress: No Stress Concern Present (12/14/2023)    [employer] - Occupational Stress Questionnaire     Feeling of Stress : Not at all   Housing Stability: Low Risk  (12/14/2023)    Housing Stability Vital Sign     Unable to Pay for Housing in the Last  Year: No     Number of Places Lived in the Last Year: 1     Unstable Housing in the Last Year: No     Medication List with Changes/Refills   Current Medications    ASPIRIN (ECOTRIN) 81 MG EC TABLET    Take 81 mg by mouth once daily.    ATORVASTATIN (LIPITOR) 40 MG TABLET    TAKE 1 TABLET BY MOUTH ONCE DAILY IN THE EVENING    FEBUXOSTAT (ULORIC) 40 MG TAB    Take 1 tablet by mouth once daily    FLUOROURACIL (EFUDEX) 5 % CREAM    Apply topically 2 (two) times daily.    FLUZONE HIGHDOSE QUAD 23-24  MCG/0.7 ML SYRG        GABAPENTIN (NEURONTIN) 100 MG CAPSULE    Take 100 mg by mouth 2 (two) times daily.    LISINOPRIL-HYDROCHLOROTHIAZIDE (PRINZIDE,ZESTORETIC) 20-12.5 MG PER TABLET    Take 1 tablet by mouth once daily    MELOXICAM (MOBIC) 15 MG TABLET    Take 1 tablet by mouth once daily    MULTIVITAMIN CAPSULE    Take 1 capsule by mouth once daily.    NADOLOL (CORGARD) 20 MG TABLET    Take 1 tablet by mouth once daily    SERTRALINE (ZOLOFT) 100 MG TABLET    Take 1 tablet by mouth once daily     Review of patient's allergies indicates:  No Known Allergies  Review of Systems   Constitutional: Negative for decreased appetite.   HENT:  Negative for tinnitus.    Eyes:  Negative for double vision.   Cardiovascular:  Negative for chest pain.   Respiratory:  Negative for wheezing.    Hematologic/Lymphatic: Negative for bleeding problem.   Skin:  Negative for dry skin.   Musculoskeletal:  Positive for gout, joint pain and stiffness. Negative for arthritis, back pain and neck pain.   Gastrointestinal:  Negative for abdominal pain.   Genitourinary:  Negative for bladder incontinence.   Neurological:  Negative for numbness, paresthesias and sensory change.   Psychiatric/Behavioral:  Negative for altered mental status.        Objective:   Body mass index is 31.47 kg/m².  There were no vitals filed for this visit.       General    Constitutional: He is oriented to person, place, and time. He appears well-developed.   HENT:    Head: Atraumatic.   Eyes: EOM are normal.   Cardiovascular:  Normal rate.            Pulmonary/Chest: Effort normal.   Neurological: He is alert and oriented to person, place, and time.   Psychiatric: Judgment normal.            Cervical rotation is very functional  Ambulate with very mild limp  Bilateral upper extremity neurovascularly intact.  Very faint radial and ulnar pulses.  Greater than 3 sec capillary refill in the fingers due to Raynaud's phenomena.  Strength is 5/5 throughout motor groups  Lumbar without pain  Pelvis is level  Bilateral hips passive motion no pain no pain to palpation over the trochanters  Hip flexors, abductors, adductors, quads, hamstrings, ankle extensors and flexors inverters and everters all 5/5  Bilateral knee with varus deformity.  He does have moderate tenderness medial  joint and crepitus to compression on the patella.  Range of motion he has 5° of flexion contractures and flexes 120°.  Collaterals and cruciates are stable.  Mild to moderate swelling.  Questionable Lola sign  Calves are soft nontender  Minimal varicosities  Ankle motion intact  DP 1+  Skin is warm to touch no obvious lesions    Relevant imaging results reviewed and interpreted by me, discussed with the patient and / or family today   X-ray 05/02/2024 bilateral knees with complete loss medial joint space and marginal osteophytic changes consistent with tricompartmental arthritis and varus deformity no fracture seen  X-ray 11/28/2022 bilateral knees with complete loss of medial joint space with marginal osteophytic changes and varus deformity.  Consistent with severe arthritis of the knee  X-ray 12/02/2021 bilateral knees with complete loss of medial joint space with marginal osteophytes consistent of bilateral knees arthrosis with varus deformity  X-ray 09/10/2020 bilateral knees showing medial joint narrowing almost 80% with osteophytic changes and cystic degeneration consistent with bilateral knee  arthrosis  Assessment:     Encounter Diagnoses   Name Primary?    Arthritis of knee, right Yes    Acquired varus deformity knee, right     Arthritis of knee, left     Acquired varus deformity knee, left         Plan:   Arthritis of knee, right    Acquired varus deformity knee, right    Arthritis of knee, left    Acquired varus deformity knee, left         Patient Instructions   X-rays from 5/2/24 showing severe arthritis both of her knees bone-on-bone on the inside   You would like to have your right knee replacement done  Right now you work is basically sitting down which is great and you planning on retiring within 3 years from now  Total knee replacement will get you out for 3 months unless you doing really well then we can have you return earlier.  You have to tell us that you can do your job without difficulty after surgery to return before 3 months.  You should asked to see if you have short term disability from work so we can fill up does paperwork for you  Total knee replacement is considered outpatient surgery by the government that means you you will have you surgery and go home the same day.  Surgery is roughly 2 hours done under general anesthetic or spinal.  Once you are awake in recovery room therapist will get you up and walking in recovery room and then you go home.  We will arrange for home physical therapy home health nurse to come and check on you for couple weeks.  After 2 weeks we will get you going to outpatient physical therapy.  Discuss you situation at home her alone however there is a sister in a [facility] that might be able to help you after surgery.  You can stay with a around 2-3 weeks until you are able to drive.  There is a mandatory class you have to attend in the hospital prior to surgery where you will meet the therapist the home health agency and they will arrange for whatever you need at home.  Most people only need a walker which will get arranged for prior to  surgery  Procedure, common risks and benefits,alternatives discussed in details.All questions answered.Patient expressed understanding.Patient instructed to call for any questions that could arise in the future.    Most common Risks:  Infection/less than 2% chance  Leg-length discrepancy    Neuro-vascular injury ( resulting in loss of motor and sensory functions)/almost all total knee replacements are slightly numb on the outside of the knee.  Very rarely we get someone can not move his ankle up and it is called peroneal palsy or footdrop.  80% of foot drops recuperate within 6 months to a year  Pain  Blood clot    Loss of motion/success of total knee is by you doing the physical therapy even though you hurt you have to do it otherwise you scar down and you lose motion and you will not have as good results the.  Studies have shown improvement with total knees up to 18 months after surgery  Fracture of bone  Failure of procedure to achieve its intended purpose/total knee replacement is not perfect.  It is 80% successful in decreasing pain and increasing function but not perfect.  You might feel a little clicking a little noise in the knee a little bit you might feel some changes in the weather might give you a little pain  Failure of hardware/total knee replacement made out of metal and plastic in might last on the average 14 to 20 years  Non-union or mal-union of bone  Malalignment  Death/you already seen the heart doctor    Patient instructions for joint replacement    Before surgery  1.  Shower with Hibiclens soap/antibacterial for 3 days prior to surgery to decrease risk of infection  2..  Stop all blood thinners/aspirin, Coumadin, warfarin, Plavix, Eliquis, Xarelto etc 5 days prior to surgery  3.  No eating or drinking after midnight the night before surgery.  We would like you to drink a bottle of Gatorade or Powerade or Pedialyte the night before surgery prior to midnight so you do not get dehydrated waiting  to undergo surgery the next day  4.  Take Tylenol 650 mg the night prior to surgery    Ask physicians for prescription of Celebrex or Mobic if needed    After surgery at home  1.  Take Tylenol 650 mg 3 times a day for 14 days then as needed for mild pain  2.  Take gabapentin 300 mg nightly for 6 weeks/helps with nerve pain and help you sleep  3.  Take Celebrex 200 mg or meloxicam 15 mg daily for 6 weeks unless having cardiac issues to take for 2 weeks only  4.  Must take aspirin 81 mg twice a day for 6 weeks unless you are on other blood thinners/Plavix, Eliquis, Xarelto, Coumadin etc  5.  Must wear compressive stockings for 6 weeks minimum to decrease the risk of blood clot and swelling  6.  Hydrocodone/Norco or oxycodone/Percocet will be prescribed to take every 6 hr as needed for breakthrough pain  7.  May apply ice on the knee to help with decreasing pain  8.  Keep wound dry for 2 weeks until stitches/staples removed than you will be allowed to shower in 24 hr and get the wound wet.  No soaking of the wound in the tub or swimming for 4 weeks after surgery  9.  No driving for 3-4 weeks unless specified by physician  10.  Avoid touching the wound or surrounding area /at least 2 inches on each side of the surgical incision until staples are removed/stitches   11.  May change the surgical dressing if extremely bloody or has drainage on it. May clean the wound with peroxide or Betadine and apply sterile dressing and Ace wrap over it  12.  Leave hospital dressing on for 3 days then may change by applying sterile 4 x 4 and Ace wrap after cleaning with Betadine or peroxide.  May leave this dressing change to home health nurses     Right total knee replacement  No metal allergy   He will arrange to stay at his sister in a [place]  Already gotten clearance by Cardiology         The mobility limitation can not be sufficiently resolved by the use of a cane.  Patient's functional mobility deficit can be sufficiently  resolved with the use of a rolling walker.  Patient has mobility limitation significantly impairs their ability to participate in 1 or more activities of daily living.  The use of a rolling walker we will significantly improve the patient's ability to participate in  MRADLS and it is to be used on a regular basis in the home.                Kellgren Dick scale 3. Both knees    Patient had a lot of questions about total knee replacement and how it is performed and what is the outcomes and all of that with discussed in details with him.  We showed him on the model we showed him x-rays that have total knee replacement.  I discussed how I control the pain with him and how why inject the inside of the knee and do my own blocks to allow discharge the same day and decrease pain.  I did tell him also that all of the work depends on the patient doing the therapy and range of motion otherwise I will stiffen up on them.  I did showing x-rays showed him on the model described how it is done.    Disclaimer: This note was prepared using a voice recognition system and is likely to have sound alike errors within the text.

## 2024-06-24 NOTE — PATIENT INSTRUCTIONS
X-rays from 5/2/24 showing severe arthritis both of her knees bone-on-bone on the inside   You would like to have your right knee replacement done  Right now you work is basically sitting down which is great and you planning on retiring within 3 years from now  Total knee replacement will get you out for 3 months unless you doing really well then we can have you return earlier.  You have to tell us that you can do your job without difficulty after surgery to return before 3 months.  You should asked to see if you have short term disability from work so we can fill up does paperwork for you  Total knee replacement is considered outpatient surgery by the government that means you you will have you surgery and go home the same day.  Surgery is roughly 2 hours done under general anesthetic or spinal.  Once you are awake in recovery room therapist will get you up and walking in recovery room and then you go home.  We will arrange for home physical therapy home health nurse to come and check on you for couple weeks.  After 2 weeks we will get you going to outpatient physical therapy.  Discuss you situation at home her alone however there is a sister in a [place] that might be able to help you after surgery.  You can stay with a around 2-3 weeks until you are able to drive.  There is a mandatory class you have to attend in the hospital prior to surgery where you will meet the therapist the home health agency and they will arrange for whatever you need at home.  Most people only need a walker which will get arranged for prior to surgery  Procedure, common risks and benefits,alternatives discussed in details.All questions answered.Patient expressed understanding.Patient instructed to call for any questions that could arise in the future.    Most common Risks:  Infection/less than 2% chance  Leg-length discrepancy    Neuro-vascular injury ( resulting in loss of motor and sensory functions)/almost all total knee  replacements are slightly numb on the outside of the knee.  Very rarely we get someone can not move his ankle up and it is called peroneal palsy or footdrop.  80% of foot drops recuperate within 6 months to a year  Pain  Blood clot    Loss of motion/success of total knee is by you doing the physical therapy even though you hurt you have to do it otherwise you scar down and you lose motion and you will not have as good results the.  Studies have shown improvement with total knees up to 18 months after surgery  Fracture of bone  Failure of procedure to achieve its intended purpose/total knee replacement is not perfect.  It is 80% successful in decreasing pain and increasing function but not perfect.  You might feel a little clicking a little noise in the knee a little bit you might feel some changes in the weather might give you a little pain  Failure of hardware/total knee replacement made out of metal and plastic in might last on the average 14 to 20 years  Non-union or mal-union of bone  Malalignment  Death/you already seen the heart doctor    Patient instructions for joint replacement    Before surgery  1.  Shower with Hibiclens soap/antibacterial for 3 days prior to surgery to decrease risk of infection  2..  Stop all blood thinners/aspirin, Coumadin, warfarin, Plavix, Eliquis, Xarelto etc 5 days prior to surgery  3.  No eating or drinking after midnight the night before surgery.  We would like you to drink a bottle of Gatorade or Powerade or Pedialyte the night before surgery prior to midnight so you do not get dehydrated waiting to undergo surgery the next day  4.  Take Tylenol 650 mg the night prior to surgery    Ask physicians for prescription of Celebrex or Mobic if needed    After surgery at home  1.  Take Tylenol 650 mg 3 times a day for 14 days then as needed for mild pain  2.  Take gabapentin 300 mg nightly for 6 weeks/helps with nerve pain and help you sleep  3.  Take Celebrex 200 mg or meloxicam 15 mg  daily for 6 weeks unless having cardiac issues to take for 2 weeks only  4.  Must take aspirin 81 mg twice a day for 6 weeks unless you are on other blood thinners/Plavix, Eliquis, Xarelto, Coumadin etc  5.  Must wear compressive stockings for 6 weeks minimum to decrease the risk of blood clot and swelling  6.  Hydrocodone/Norco or oxycodone/Percocet will be prescribed to take every 6 hr as needed for breakthrough pain  7.  May apply ice on the knee to help with decreasing pain  8.  Keep wound dry for 2 weeks until stitches/staples removed than you will be allowed to shower in 24 hr and get the wound wet.  No soaking of the wound in the tub or swimming for 4 weeks after surgery  9.  No driving for 3-4 weeks unless specified by physician  10.  Avoid touching the wound or surrounding area /at least 2 inches on each side of the surgical incision until staples are removed/stitches   11.  May change the surgical dressing if extremely bloody or has drainage on it. May clean the wound with peroxide or Betadine and apply sterile dressing and Ace wrap over it  12.  Leave hospital dressing on for 3 days then may change by applying sterile 4 x 4 and Ace wrap after cleaning with Betadine or peroxide.  May leave this dressing change to home health nurses     Right total knee replacement  No metal allergy   He will arrange to stay at his sister in a [place]  Already gotten clearance by Cardiology         The mobility limitation can not be sufficiently resolved by the use of a cane.  Patient's functional mobility deficit can be sufficiently resolved with the use of a rolling walker.  Patient has mobility limitation significantly impairs their ability to participate in 1 or more activities of daily living.  The use of a rolling walker we will significantly improve the patient's ability to participate in  MRADLS and it is to be used on a regular basis in the home.

## 2024-06-25 ENCOUNTER — PATIENT OUTREACH (OUTPATIENT)
Dept: ADMINISTRATIVE | Facility: HOSPITAL | Age: 69
End: 2024-06-25
Payer: COMMERCIAL

## 2024-06-25 DIAGNOSIS — Z12.11 SPECIAL SCREENING FOR MALIGNANT NEOPLASMS, COLON: Primary | ICD-10-CM

## 2024-06-25 NOTE — PROGRESS NOTES
Population Health Chart Review & Patient Outreach Details      Additional [facility] Health Notes:               Updates Requested / Reviewed:      Updated Care Coordination Note, Care Everywhere, and Immunizations Reconciliation Completed or Queried: [facility] Topics Overdue:      VB Score: 1     Colon Cancer Screening    Pneumonia Vaccine  Shingles/Zoster Vaccine                  Health Maintenance Topic(s) Outreach Outcomes & Actions Taken:    Colorectal Cancer Screening - Outreach Outcomes & Actions Taken  : Colonoscopy Case Request / Referral / Home Test Order Placed and referral to endo  per GUILLERMO in basket message on 06/25/2024 stating that pt agrees to colonoscopy.

## 2024-07-26 DIAGNOSIS — M25.551 RIGHT HIP PAIN: ICD-10-CM

## 2024-07-26 DIAGNOSIS — E78.5 HYPERLIPIDEMIA: ICD-10-CM

## 2024-07-26 DIAGNOSIS — M17.11 ARTHRITIS OF RIGHT KNEE: ICD-10-CM

## 2024-07-26 RX ORDER — MELOXICAM 15 MG/1
TABLET ORAL
Qty: 30 TABLET | Refills: 0 | Status: SHIPPED | OUTPATIENT
Start: 2024-07-26

## 2024-07-26 NOTE — TELEPHONE ENCOUNTER
Care Due:                  Date            Visit Type   Department     Provider  --------------------------------------------------------------------------------    Last Visit: None Found      None         None Found  Next Visit: None Scheduled  None         None Found                                                            Last  Test          Frequency    Reason                     Performed    Due Date  --------------------------------------------------------------------------------    Office Visit  15 months..  atorvastatin, febuxostat,   Not Found    Overdue                             lisinopriL-hydrochlorothi                             azide, sertraline........    Uric Acid...  12 months..  febuxostat...............  Not Found    Overdue    [employer] Embedded Care Due Messages. Reference number: [id number].   7/26/2024 12:23:48 PM CDT

## 2024-07-28 NOTE — TELEPHONE ENCOUNTER
Refill Routing Note   Medication(s) are not appropriate for processing by [facility] for the following reason(s):        Patient not seen by provider within 15 months    [facility] action(s):  Defer   Requires appointment : Yes     Requires labs : Yes             Appointments  past 12m or future 3m with PCP    Date Provider   Last Visit   6/16/2022 Ravindra Bernard MD   Next Visit   Visit date not found Ravindra Bernard MD   ED visits in past 90 days: 0        Note composed:2:08 PM 07/28/2024

## 2024-07-29 RX ORDER — ATORVASTATIN CALCIUM 40 MG/1
40 TABLET, FILM COATED ORAL NIGHTLY
Qty: 90 TABLET | Refills: 0 | Status: SHIPPED | OUTPATIENT
Start: 2024-07-29

## 2024-11-19 ENCOUNTER — OFFICE VISIT (OUTPATIENT)
Dept: CARDIOLOGY | Facility: CLINIC | Age: 69
End: 2024-11-19
Payer: COMMERCIAL

## 2024-11-19 VITALS
OXYGEN SATURATION: 98 % | DIASTOLIC BLOOD PRESSURE: 70 MMHG | HEART RATE: 81 BPM | WEIGHT: 248.69 LBS | SYSTOLIC BLOOD PRESSURE: 103 MMHG | BODY MASS INDEX: 32.81 KG/M2

## 2024-11-19 DIAGNOSIS — M17.11 ARTHRITIS OF RIGHT KNEE: ICD-10-CM

## 2024-11-19 DIAGNOSIS — Z82.49 FAMILY HISTORY OF CABG: ICD-10-CM

## 2024-11-19 DIAGNOSIS — E78.5 HYPERLIPIDEMIA, UNSPECIFIED HYPERLIPIDEMIA TYPE: ICD-10-CM

## 2024-11-19 DIAGNOSIS — I10 ESSENTIAL HYPERTENSION: Primary | ICD-10-CM

## 2024-11-19 DIAGNOSIS — E78.5 HYPERLIPIDEMIA: ICD-10-CM

## 2024-11-19 DIAGNOSIS — M19.90 OSTEOARTHRITIS, UNSPECIFIED OSTEOARTHRITIS TYPE, UNSPECIFIED SITE: ICD-10-CM

## 2024-11-19 DIAGNOSIS — M25.551 RIGHT HIP PAIN: ICD-10-CM

## 2024-11-19 PROCEDURE — 1126F AMNT PAIN NOTED NONE PRSNT: CPT | Mod: CPTII,S$GLB,, | Performed by: INTERNAL MEDICINE

## 2024-11-19 PROCEDURE — 3078F DIAST BP <80 MM HG: CPT | Mod: CPTII,S$GLB,, | Performed by: INTERNAL MEDICINE

## 2024-11-19 PROCEDURE — 4010F ACE/ARB THERAPY RXD/TAKEN: CPT | Mod: CPTII,S$GLB,, | Performed by: INTERNAL MEDICINE

## 2024-11-19 PROCEDURE — 99999 PR PBB SHADOW E&M-EST. PATIENT-LVL IV: CPT | Mod: PBBFAC,,, | Performed by: INTERNAL MEDICINE

## 2024-11-19 PROCEDURE — 1101F PT FALLS ASSESS-DOCD LE1/YR: CPT | Mod: CPTII,S$GLB,, | Performed by: INTERNAL MEDICINE

## 2024-11-19 PROCEDURE — 3288F FALL RISK ASSESSMENT DOCD: CPT | Mod: CPTII,S$GLB,, | Performed by: INTERNAL MEDICINE

## 2024-11-19 PROCEDURE — 1159F MED LIST DOCD IN RCRD: CPT | Mod: CPTII,S$GLB,, | Performed by: INTERNAL MEDICINE

## 2024-11-19 PROCEDURE — 99214 OFFICE O/P EST MOD 30 MIN: CPT | Mod: S$GLB,,, | Performed by: INTERNAL MEDICINE

## 2024-11-19 PROCEDURE — 3008F BODY MASS INDEX DOCD: CPT | Mod: CPTII,S$GLB,, | Performed by: INTERNAL MEDICINE

## 2024-11-19 PROCEDURE — 3074F SYST BP LT 130 MM HG: CPT | Mod: CPTII,S$GLB,, | Performed by: INTERNAL MEDICINE

## 2024-11-19 RX ORDER — ATORVASTATIN CALCIUM 40 MG/1
40 TABLET, FILM COATED ORAL NIGHTLY
Qty: 90 TABLET | Refills: 0 | Status: SHIPPED | OUTPATIENT
Start: 2024-11-19

## 2024-11-19 RX ORDER — MELOXICAM 15 MG/1
TABLET ORAL
Qty: 30 TABLET | Refills: 0 | Status: SHIPPED | OUTPATIENT
Start: 2024-11-19

## 2024-11-19 NOTE — TELEPHONE ENCOUNTER
Care Due:                  Date            Visit Type   Department     Provider  --------------------------------------------------------------------------------    Last Visit: None Found      None         None Found  Next Visit: None Scheduled  None         None Found                                                            Last  Test          Frequency    Reason                     Performed    Due Date  --------------------------------------------------------------------------------    Office Visit  15 months..  atorvastatin, febuxostat,   Not Found    Overdue                             lisinopriL-hydrochlorothi                             azide, sertraline........    CMP.........  12 months..  atorvastatin, febuxostat,   12- 12-                             lisinopriL-hydrochlorothi                             azide....................    Lipid Panel.  12 months..  atorvastatin.............  12- 12-    Uric Acid...  12 months..  febuxostat...............  Not Found    Overdue    [employer] Embedded Care Due Messages. Reference number: [id number].   11/19/2024 9:56:05 AM CST

## 2024-11-19 NOTE — TELEPHONE ENCOUNTER
Refill Routing Note   Medication(s) are not appropriate for processing by [facility] for the following reason(s):        Patient not seen by provider within 15 months    [facility] action(s):  Defer   Requires appointment : Yes   Requires labs : Yes             Appointments  past 12m or future 3m with PCP    Date Provider   Last Visit   6/16/2022 Ravindra Bernard MD   Next Visit   Visit date not found Ravindra Bernard MD   ED visits in past 90 days: 0        Note composed:11:17 AM 11/19/2024

## 2024-11-19 NOTE — PROGRESS NOTES
Subjective:   Patient ID:  Byron Styles is a 69 y.o. male who presents for evaluation of Follow-up      Follow-up  Pertinent negatives include no chest pain.   11.19.2024  Comes in for six-month visit.    Denies any significant HARDEN, angina, palpitations, lower extremity swelling syncope or presyncope   Compliant with medications.    Did not undergo his knee surgery could not find coverage at work    5.15.2024   68-year-old male never smoker,  family history of CAD, brother and father had both CABG.  [place] [employer] no chest pain  , limited with his knee.  Works in his yard    No chest pain   Normal calcium score 2014 was zero   Never smoker     Denies dyspnea on exertion.  He has  remote history of palpitations treated with nadolol and controlled and resolved    Past Medical History:   Diagnosis Date    Colon polyps 2009    Depression     Hyperlipidemia     Hypertension     Localized osteoarthrosis not specified whether primary or secondary, lower leg 4/6/2015    Raynaud's disease     Skin cancer of arm, left 05/2020    [facility]       Past Surgical History:   Procedure Laterality Date    COLONOSCOPY W/ POLYPECTOMY  2009    MOUTH SURGERY      WISDOM TOOTH EXTRACTION         Social History     Tobacco Use    Smoking status: Never     Passive exposure: Never    Smokeless tobacco: Current     Types: Chew   Substance Use Topics    Alcohol use: Yes     Comment: social/ occassional    Drug use: No       Family History   Problem Relation Name Age of Onset    Heart disease Father  65        CABG    Cancer Father          Bladder    Heart disease Brother  68        CABG    Depression Mother      Heart disease Mother          CHF       Review of Systems   Cardiovascular:  Negative for chest pain, dyspnea on exertion, palpitations and syncope.   Musculoskeletal:  Positive for arthritis.   Genitourinary: Negative.    Neurological: Negative.        Current Outpatient Medications on File Prior to Visit    Medication Sig    aspirin (ECOTRIN) 81 MG EC tablet Take 81 mg by mouth once daily.    febuxostat (ULORIC) 40 mg Tab Take 1 tablet by mouth once daily    fluorouraciL (EFUDEX) 5 % cream Apply topically 2 (two) times daily.    FLUZONE HIGHDOSE QUAD 23-24  mcg/0.7 mL Syrg     gabapentin (NEURONTIN) 100 MG capsule Take 100 mg by mouth 2 (two) times daily.    lisinopriL-hydrochlorothiazide (PRINZIDE,ZESTORETIC) 20-12.5 mg per tablet Take 1 tablet by mouth once daily    multivitamin capsule Take 1 capsule by mouth once daily.    nadoloL (CORGARD) 20 MG tablet Take 1 tablet by mouth once daily    sertraline (ZOLOFT) 100 MG tablet Take 1 tablet by mouth once daily    [DISCONTINUED] atorvastatin (LIPITOR) 40 MG tablet Take 1 tablet (40 mg total) by mouth every evening.    [DISCONTINUED] meloxicam (MOBIC) 15 MG tablet Take 1 tablet by mouth once daily     No current facility-administered medications on file prior to visit.       Objective:   Objective:  Wt Readings from Last 3 Encounters:   11/19/24 112.8 kg (248 lb 10.9 oz)   06/24/24 108.2 kg (238 lb 8.6 oz)   06/18/24 107.8 kg (237 lb 10.5 oz)     Temp Readings from Last 3 Encounters:   12/19/23 96.3 °F (35.7 °C)   08/30/22 98.4 °F (36.9 °C) (Temporal)   08/02/22 97.5 °F (36.4 °C) (Temporal)     BP Readings from Last 3 Encounters:   11/19/24 103/70   06/18/24 100/78   05/15/24 109/72     Pulse Readings from Last 3 Encounters:   11/19/24 81   06/18/24 62   05/15/24 72       Physical Exam  Vitals reviewed.   Constitutional:       Appearance: He is well-developed.   Neck:      Vascular: No carotid bruit.   Cardiovascular:      Rate and Rhythm: Normal rate and regular rhythm.      Pulses: Intact distal pulses.      Heart sounds: Normal heart sounds. No murmur heard.  Pulmonary:      Breath sounds: Normal breath sounds.   Neurological:      Mental Status: He is oriented to person, place, and time.         Lab Results   Component Value Date    CHOL 174 12/19/2023     "CHOL 176 08/30/2022    CHOL 150 08/26/2021     Lab Results   Component Value Date    HDL 36 (L) 12/19/2023    HDL 38 (L) 08/30/2022    HDL 38 (L) 08/26/2021     Lab Results   Component Value Date    LDLCALC 97.6 12/19/2023    LDLCALC 100.2 08/30/2022    LDLCALC 77.8 08/26/2021     Lab Results   Component Value Date    TRIG 202 (H) 12/19/2023    TRIG 189 (H) 08/30/2022    TRIG 171 (H) 08/26/2021     Lab Results   Component Value Date    CHOLHDL 20.7 12/19/2023    CHOLHDL 21.6 08/30/2022    CHOLHDL 25.3 08/26/2021       Chemistry        Component Value Date/Time     12/19/2023 1112    K 4.0 12/19/2023 1112     12/19/2023 1112    CO2 29 12/19/2023 1112    BUN 14 12/19/2023 1112    CREATININE 1.0 12/19/2023 1112    GLU 88 12/19/2023 1112        Component Value Date/Time    CALCIUM 9.5 12/19/2023 1112    ALKPHOS 84 12/19/2023 1112    AST 27 12/19/2023 1112    ALT 31 12/19/2023 1112    BILITOT 1.1 (H) 12/19/2023 1112    ESTGFRAFRICA >60.0 08/26/2021 1204    EGFRNONAA >60.0 08/26/2021 1204          No results found for: "TSH"  No results found for: "INR", "PROTIME"  Lab Results   Component Value Date    WBC 7.22 12/19/2023    HGB 15.7 12/19/2023    HCT 48.2 12/19/2023    MCV 90 12/19/2023     12/19/2023     BNP  @LABRCNTIP(BNP,BNPTRIAGEBLO)@  CrCl cannot be calculated (Patient's most recent lab result is older than the maximum 7 days allowed.).     Imaging:  ======    No results found for this or any previous visit.    No results found for this or any previous visit.    No results found for this or any previous visit.    No results found for this or any previous visit.    No valid procedures specified.    No results found for this or any previous visit.      No results found for this or any previous visit.      No results found for this or any previous visit.      Diagnostic Results:  ECG: Reviewed    The 10-year ASCVD risk score (David THOMSON, et al., 2019) is: 14.8%    Values used to calculate the " score:      Age: 69 years      Sex: Male      Is Non- : No      Diabetic: No      Tobacco smoker: No      Systolic Blood Pressure: 103 mmHg      Is BP treated: Yes      HDL Cholesterol: 36 mg/dL      Total Cholesterol: 174 mg/dL        Assessment and Plan:   Essential hypertension    Hyperlipidemia, unspecified hyperlipidemia type    Family history of CABG    Osteoarthritis, unspecified osteoarthritis type, unspecified site    BMI 32.0-32.9,adult      Angina free, htn controlled, euvolemic  Normal EKG.  Good functional status.   METS more than 4.   Stable and okay from cardiac standpoint to undergo his right knee surgery when able to   Reviewed all tests and above medical conditions with patient in detail and formulated treatment plan.  Risk factor modification discussed.   Cardiac low salt diet discussed.  Maintaining healthy weight and weight loss goals were discussed in clinic.    Follow up in  12 months

## 2025-02-26 RX ORDER — NADOLOL 20 MG/1
20 TABLET ORAL
Qty: 90 TABLET | Refills: 0 | Status: SHIPPED | OUTPATIENT
Start: 2025-02-26

## 2025-02-26 NOTE — TELEPHONE ENCOUNTER
Refill Routing Note   Medication(s) are not appropriate for processing by [facility] for the following reason(s):        Patient not seen by provider within 15 months    [facility] action(s):  Defer   Requires appointment : Yes     Requires labs : Yes             Appointments  past 12m or future 3m with PCP    Date Provider   Last Visit   6/16/2022 Ravindra Bernard MD   Next Visit   Visit date not found Ravindra Bernard MD   ED visits in past 90 days: 0        Note composed:10:24 AM 02/26/2025

## 2025-02-26 NOTE — TELEPHONE ENCOUNTER
Care Due:                  Date            Visit Type   Department     Provider  --------------------------------------------------------------------------------    Last Visit: None Found      None         None Found  Next Visit: None Scheduled  None         None Found                                                            Last  Test          Frequency    Reason                     Performed    Due Date  --------------------------------------------------------------------------------    Office Visit  15 months..  atorvastatin, febuxostat,   Not Found    Overdue                             lisinopriL-hydrochlorothi                             azide, sertraline........    CMP.........  12 months..  atorvastatin, febuxostat,   12- 12-                             lisinopriL-hydrochlorothi                             azide....................    Lipid Panel.  12 months..  atorvastatin.............  12- 12-    Uric Acid...  12 months..  febuxostat...............  Not Found    Overdue    [employer] Embedded Care Due Messages. Reference number: [id number].   2/26/2025 6:18:56 AM CST

## 2025-02-26 NOTE — TELEPHONE ENCOUNTER
Patient overdue for follow up.  Refill is given but the patient needs an appointment with Dr. Bernard or Tristan Marinelli NP, for follow up.

## 2025-03-07 RX ORDER — SERTRALINE HYDROCHLORIDE 100 MG/1
100 TABLET, FILM COATED ORAL
Qty: 90 TABLET | Refills: 0 | Status: SHIPPED | OUTPATIENT
Start: 2025-03-07

## 2025-03-07 NOTE — TELEPHONE ENCOUNTER
No care due was identified.  Health [facility] Embedded Care Due Messages. Reference number: [id number].   3/07/2025 10:46:00 AM CST

## 2025-03-11 ENCOUNTER — LAB VISIT (OUTPATIENT)
Dept: LAB | Facility: HOSPITAL | Age: 70
End: 2025-03-11
Attending: NURSE PRACTITIONER
Payer: COMMERCIAL

## 2025-03-11 ENCOUNTER — OFFICE VISIT (OUTPATIENT)
Dept: INTERNAL MEDICINE | Facility: CLINIC | Age: 70
End: 2025-03-11
Payer: COMMERCIAL

## 2025-03-11 VITALS
WEIGHT: 242.75 LBS | OXYGEN SATURATION: 97 % | SYSTOLIC BLOOD PRESSURE: 118 MMHG | HEIGHT: 73 IN | HEART RATE: 75 BPM | BODY MASS INDEX: 32.17 KG/M2 | DIASTOLIC BLOOD PRESSURE: 68 MMHG | TEMPERATURE: 96 F

## 2025-03-11 DIAGNOSIS — Z82.49 FAMILY HISTORY OF HEART DISEASE: ICD-10-CM

## 2025-03-11 DIAGNOSIS — I10 ESSENTIAL HYPERTENSION: ICD-10-CM

## 2025-03-11 DIAGNOSIS — Z13.1 SCREENING FOR DIABETES MELLITUS: ICD-10-CM

## 2025-03-11 DIAGNOSIS — Z12.5 SCREENING FOR MALIGNANT NEOPLASM OF PROSTATE: ICD-10-CM

## 2025-03-11 DIAGNOSIS — Z12.11 COLON CANCER SCREENING: ICD-10-CM

## 2025-03-11 DIAGNOSIS — E78.2 MIXED HYPERLIPIDEMIA: ICD-10-CM

## 2025-03-11 DIAGNOSIS — E78.2 MIXED HYPERLIPIDEMIA: Primary | ICD-10-CM

## 2025-03-11 LAB
BASOPHILS # BLD AUTO: 0.09 K/UL (ref 0–0.2)
BASOPHILS NFR BLD: 1.4 % (ref 0–1.9)
DIFFERENTIAL METHOD BLD: ABNORMAL
EOSINOPHIL # BLD AUTO: 0.2 K/UL (ref 0–0.5)
EOSINOPHIL NFR BLD: 2.5 % (ref 0–8)
ERYTHROCYTE [DISTWIDTH] IN BLOOD BY AUTOMATED COUNT: 14.2 % (ref 11.5–14.5)
HCT VFR BLD AUTO: 47.6 % (ref 40–54)
HGB BLD-MCNC: 15.1 G/DL (ref 14–18)
IMM GRANULOCYTES # BLD AUTO: 0.02 K/UL (ref 0–0.04)
IMM GRANULOCYTES NFR BLD AUTO: 0.3 % (ref 0–0.5)
LYMPHOCYTES # BLD AUTO: 1.2 K/UL (ref 1–4.8)
LYMPHOCYTES NFR BLD: 18.5 % (ref 18–48)
MCH RBC QN AUTO: 29.2 PG (ref 27–31)
MCHC RBC AUTO-ENTMCNC: 31.7 G/DL (ref 32–36)
MCV RBC AUTO: 92 FL (ref 82–98)
MONOCYTES # BLD AUTO: 0.6 K/UL (ref 0.3–1)
MONOCYTES NFR BLD: 10 % (ref 4–15)
NEUTROPHILS # BLD AUTO: 4.3 K/UL (ref 1.8–7.7)
NEUTROPHILS NFR BLD: 67.3 % (ref 38–73)
NRBC BLD-RTO: 0 /100 WBC
PLATELET # BLD AUTO: 323 K/UL (ref 150–450)
PMV BLD AUTO: 9.7 FL (ref 9.2–12.9)
RBC # BLD AUTO: 5.17 M/UL (ref 4.6–6.2)
WBC # BLD AUTO: 6.43 K/UL (ref 3.9–12.7)

## 2025-03-11 PROCEDURE — 99214 OFFICE O/P EST MOD 30 MIN: CPT | Mod: S$GLB,,, | Performed by: NURSE PRACTITIONER

## 2025-03-11 PROCEDURE — 3008F BODY MASS INDEX DOCD: CPT | Mod: CPTII,S$GLB,, | Performed by: NURSE PRACTITIONER

## 2025-03-11 PROCEDURE — 99999 PR PBB SHADOW E&M-EST. PATIENT-LVL IV: CPT | Mod: PBBFAC,,, | Performed by: NURSE PRACTITIONER

## 2025-03-11 PROCEDURE — 83036 HEMOGLOBIN GLYCOSYLATED A1C: CPT | Performed by: NURSE PRACTITIONER

## 2025-03-11 PROCEDURE — 80061 LIPID PANEL: CPT | Performed by: NURSE PRACTITIONER

## 2025-03-11 PROCEDURE — 85025 COMPLETE CBC W/AUTO DIFF WBC: CPT | Performed by: NURSE PRACTITIONER

## 2025-03-11 PROCEDURE — 3078F DIAST BP <80 MM HG: CPT | Mod: CPTII,S$GLB,, | Performed by: NURSE PRACTITIONER

## 2025-03-11 PROCEDURE — 84153 ASSAY OF PSA TOTAL: CPT | Performed by: NURSE PRACTITIONER

## 2025-03-11 PROCEDURE — 80053 COMPREHEN METABOLIC PANEL: CPT | Performed by: NURSE PRACTITIONER

## 2025-03-11 PROCEDURE — 36415 COLL VENOUS BLD VENIPUNCTURE: CPT | Mod: PO | Performed by: NURSE PRACTITIONER

## 2025-03-11 PROCEDURE — 1159F MED LIST DOCD IN RCRD: CPT | Mod: CPTII,S$GLB,, | Performed by: NURSE PRACTITIONER

## 2025-03-11 PROCEDURE — 1101F PT FALLS ASSESS-DOCD LE1/YR: CPT | Mod: CPTII,S$GLB,, | Performed by: NURSE PRACTITIONER

## 2025-03-11 PROCEDURE — 1160F RVW MEDS BY RX/DR IN RCRD: CPT | Mod: CPTII,S$GLB,, | Performed by: NURSE PRACTITIONER

## 2025-03-11 PROCEDURE — 3288F FALL RISK ASSESSMENT DOCD: CPT | Mod: CPTII,S$GLB,, | Performed by: NURSE PRACTITIONER

## 2025-03-11 PROCEDURE — 3074F SYST BP LT 130 MM HG: CPT | Mod: CPTII,S$GLB,, | Performed by: NURSE PRACTITIONER

## 2025-03-11 NOTE — PROGRESS NOTES
"Subjective:       Patient ID: Byron Styles is a 69 y.o. male.  CHIEF COMPLAINT:  - Mr. Styles presents for a routine follow-up visit and to address concerns about family history of heart disease.    HPI:  Mr. Styles, a 69-year-old male turning 70 this year, presents with concerns about his family history of heart disease. His father had 2 bypasses, his oldest brother (who will be 80 this year) has had a bypass, and another brother 2 years older than him has a stent. His oldest sister is scheduled for an angiogram next week to determine if she needs a stent.    His concern was prompted by his oldest brother's calcium screening, which revealed 99% blockage in the left anterior descending artery, despite passing other cardiac tests. Mr. Styles had a calcium screening done in [place], [place], approximately 10 years ago (2014), which showed a score of zero.    He was previously evaluated by a cardiologist, Dr. Gonsalez, for a follow-up visit after a planned surgery (details not specified). At that time, Dr. Gonsalez found everything to be fine and advised him to return in a year. Mr. Styles expresses uncertainty about what other tests might be necessary to assess his cardiac health, given his family history and approaching 70th birthday.    He denies any issues since the last visit.        /68 (Patient Position: Sitting)   Pulse 75   Temp 96.2 °F (35.7 °C) (Tympanic)   Ht 6' 1" (1.854 m)   Wt 110.1 kg (242 lb 11.6 oz)   SpO2 97%   BMI 32.02 kg/m²     Review of Systems   Constitutional:  Negative for activity change and unexpected weight change.   HENT:  Negative for hearing loss, rhinorrhea and trouble swallowing.    Eyes:  Negative for discharge and visual disturbance.   Respiratory:  Negative for chest tightness and wheezing.    Cardiovascular:  Negative for chest pain and palpitations.   Gastrointestinal:  Negative for blood in stool, constipation, diarrhea and vomiting.   Endocrine: Negative for " "polydipsia and polyuria.   Genitourinary:  Negative for difficulty urinating, hematuria and urgency.   Musculoskeletal:  Positive for arthralgias. Negative for joint swelling and neck pain.   Neurological:  Negative for weakness and headaches.   Psychiatric/Behavioral:  Negative for confusion and dysphoric mood.        Objective:      Physical Exam  Vitals reviewed.   Constitutional:       General: He is not in acute distress.     Appearance: Normal appearance. He is normal weight.   HENT:      Head: Normocephalic.      Right Ear: Tympanic membrane normal.      Left Ear: Tympanic membrane normal.      Nose: Nose normal.   Eyes:      Conjunctiva/sclera: Conjunctivae normal.      Pupils: Pupils are equal, round, and reactive to light.   Cardiovascular:      Rate and Rhythm: Normal rate.      Pulses: Normal pulses.   Pulmonary:      Effort: Pulmonary effort is normal.   Abdominal:      General: Abdomen is flat.      Palpations: Abdomen is soft.   Musculoskeletal:         General: Normal range of motion.      Cervical back: Normal range of motion.   Skin:     General: Skin is warm.      Capillary Refill: Capillary refill takes less than 2 seconds.   Neurological:      Mental Status: He is alert and oriented to person, place, and time. Mental status is at baseline.   Psychiatric:         Mood and Affect: Mood normal.         Assessment and Plan        Byron Be" was seen today for annual exam.    Diagnoses and all orders for this visit:    Mixed hyperlipidemia  -     CBC Auto Differential; Future  -     Comprehensive Metabolic Panel; Future  -     Lipid Panel; Future    Essential hypertension  -     CBC Auto Differential; Future  -     Comprehensive Metabolic Panel; Future  -     Lipid Panel; Future    Screening for malignant neoplasm of prostate  -     PSA, Screening; Future    Screening for diabetes mellitus  -     Hemoglobin A1C; Future    Family history of heart disease    Colon cancer screening    BMI " 32.0-32.9,adult      There are no Patient Instructions on file for this visit.      1. Mixed hyperlipidemia    2. Essential hypertension    3. Screening for malignant neoplasm of prostate    4. Screening for diabetes mellitus    5. Family history of heart disease    6. Colon cancer screening    7. BMI 32.0-32.9,adult        Assessment & Plan    FAMILY HISTORY OF HEART DISEASE:  - Reviewed the patient's strong family history of heart disease, including father with two bypasses, oldest brother (80 years old) with a bypass, another brother with a stent, and oldest sister scheduled for an angiogram.  - Noted that the patient's oldest brother's heart problem was discovered through a calcium screening, which showed 99% blockage in the left anterior descending artery.  - Considered the patient's previous calcium score of 0 from 2014, approximately 10 years ago.  - Recommend annual follow-up with a cardiologist for comprehensive cardiac workup, given family history and patient's age.    HYPERTENSION:  - chronic, stable.   - continue meds as prescribed    MIXED HYPERLIPIDEMIA  - due for updated FLP. Update today    LABS:  - Ordered labs for further evaluation.  - Will review lab results and communicate findings to the patient.   - discussed colon cancer screening however pt does not have a ride- we discussed medical transportation, family. He will let us know when he's ready.       FOLLOW UP:  - 6 months and PRN    This note was generated with the assistance of ambient listening technology. Verbal consent was obtained by the patient and accompanying visitor(s) for the recording of patient appointment to facilitate this note. I attest to having reviewed and edited the generated note for accuracy, though some syntax or spelling errors may persist. Please contact the author of this note for any clarification.

## 2025-03-12 ENCOUNTER — RESULTS FOLLOW-UP (OUTPATIENT)
Dept: INTERNAL MEDICINE | Facility: CLINIC | Age: 70
End: 2025-03-12
Payer: COMMERCIAL

## 2025-03-12 LAB
ALBUMIN SERPL BCP-MCNC: 3.7 G/DL (ref 3.5–5.2)
ALP SERPL-CCNC: 83 U/L (ref 40–150)
ALT SERPL W/O P-5'-P-CCNC: 23 U/L (ref 10–44)
ANION GAP SERPL CALC-SCNC: 12 MMOL/L (ref 8–16)
AST SERPL-CCNC: 36 U/L (ref 10–40)
BILIRUB SERPL-MCNC: 0.6 MG/DL (ref 0.1–1)
BUN SERPL-MCNC: 12 MG/DL (ref 8–23)
CALCIUM SERPL-MCNC: 9.4 MG/DL (ref 8.7–10.5)
CHLORIDE SERPL-SCNC: 103 MMOL/L (ref 95–110)
CHOLEST SERPL-MCNC: 147 MG/DL (ref 120–199)
CHOLEST/HDLC SERPL: 3.7 {RATIO} (ref 2–5)
CO2 SERPL-SCNC: 25 MMOL/L (ref 23–29)
COMPLEXED PSA SERPL-MCNC: 1.5 NG/ML (ref 0–4)
CREAT SERPL-MCNC: 1 MG/DL (ref 0.5–1.4)
EST. GFR  (NO RACE VARIABLE): >60 ML/MIN/1.73 M^2
ESTIMATED AVG GLUCOSE: 111 MG/DL (ref 68–131)
GLUCOSE SERPL-MCNC: 64 MG/DL (ref 70–110)
HBA1C MFR BLD: 5.5 % (ref 4–5.6)
HDLC SERPL-MCNC: 40 MG/DL (ref 40–75)
HDLC SERPL: 27.2 % (ref 20–50)
LDLC SERPL CALC-MCNC: 67.4 MG/DL (ref 63–159)
NONHDLC SERPL-MCNC: 107 MG/DL
POTASSIUM SERPL-SCNC: 3.8 MMOL/L (ref 3.5–5.1)
PROT SERPL-MCNC: 7.4 G/DL (ref 6–8.4)
SODIUM SERPL-SCNC: 140 MMOL/L (ref 136–145)
TRIGL SERPL-MCNC: 198 MG/DL (ref 30–150)

## 2025-04-30 ENCOUNTER — PATIENT MESSAGE (OUTPATIENT)
Dept: CARDIOLOGY | Facility: CLINIC | Age: 70
End: 2025-04-30
Payer: COMMERCIAL

## 2025-05-20 ENCOUNTER — OFFICE VISIT (OUTPATIENT)
Dept: CARDIOLOGY | Facility: CLINIC | Age: 70
End: 2025-05-20
Payer: COMMERCIAL

## 2025-05-20 ENCOUNTER — PATIENT MESSAGE (OUTPATIENT)
Dept: CARDIOLOGY | Facility: HOSPITAL | Age: 70
End: 2025-05-20
Payer: COMMERCIAL

## 2025-05-20 VITALS
WEIGHT: 244.81 LBS | SYSTOLIC BLOOD PRESSURE: 118 MMHG | BODY MASS INDEX: 32.3 KG/M2 | OXYGEN SATURATION: 99 % | HEART RATE: 75 BPM | DIASTOLIC BLOOD PRESSURE: 80 MMHG

## 2025-05-20 DIAGNOSIS — I70.0 AORTIC ATHEROSCLEROSIS: ICD-10-CM

## 2025-05-20 DIAGNOSIS — E78.5 HYPERLIPIDEMIA, UNSPECIFIED HYPERLIPIDEMIA TYPE: ICD-10-CM

## 2025-05-20 DIAGNOSIS — I10 PRIMARY HYPERTENSION: ICD-10-CM

## 2025-05-20 DIAGNOSIS — Z82.49 FAMILY HISTORY OF CABG: ICD-10-CM

## 2025-05-20 DIAGNOSIS — I10 ESSENTIAL HYPERTENSION: Primary | ICD-10-CM

## 2025-05-20 DIAGNOSIS — M19.90 OSTEOARTHRITIS, UNSPECIFIED OSTEOARTHRITIS TYPE, UNSPECIFIED SITE: ICD-10-CM

## 2025-05-20 PROCEDURE — 3074F SYST BP LT 130 MM HG: CPT | Mod: CPTII,S$GLB,, | Performed by: INTERNAL MEDICINE

## 2025-05-20 PROCEDURE — 3044F HG A1C LEVEL LT 7.0%: CPT | Mod: CPTII,S$GLB,, | Performed by: INTERNAL MEDICINE

## 2025-05-20 PROCEDURE — 1159F MED LIST DOCD IN RCRD: CPT | Mod: CPTII,S$GLB,, | Performed by: INTERNAL MEDICINE

## 2025-05-20 PROCEDURE — 3008F BODY MASS INDEX DOCD: CPT | Mod: CPTII,S$GLB,, | Performed by: INTERNAL MEDICINE

## 2025-05-20 PROCEDURE — 99999 PR PBB SHADOW E&M-EST. PATIENT-LVL III: CPT | Mod: PBBFAC,,, | Performed by: INTERNAL MEDICINE

## 2025-05-20 PROCEDURE — 99214 OFFICE O/P EST MOD 30 MIN: CPT | Mod: S$GLB,,, | Performed by: INTERNAL MEDICINE

## 2025-05-20 PROCEDURE — 3079F DIAST BP 80-89 MM HG: CPT | Mod: CPTII,S$GLB,, | Performed by: INTERNAL MEDICINE

## 2025-05-20 PROCEDURE — 4010F ACE/ARB THERAPY RXD/TAKEN: CPT | Mod: CPTII,S$GLB,, | Performed by: INTERNAL MEDICINE

## 2025-05-20 PROCEDURE — 3288F FALL RISK ASSESSMENT DOCD: CPT | Mod: CPTII,S$GLB,, | Performed by: INTERNAL MEDICINE

## 2025-05-20 PROCEDURE — 1101F PT FALLS ASSESS-DOCD LE1/YR: CPT | Mod: CPTII,S$GLB,, | Performed by: INTERNAL MEDICINE

## 2025-06-03 ENCOUNTER — OFFICE VISIT (OUTPATIENT)
Dept: OPHTHALMOLOGY | Facility: CLINIC | Age: 70
End: 2025-06-03
Payer: COMMERCIAL

## 2025-06-03 DIAGNOSIS — H52.03 HYPEROPIA WITH ASTIGMATISM AND PRESBYOPIA, BILATERAL: ICD-10-CM

## 2025-06-03 DIAGNOSIS — H52.203 HYPEROPIA WITH ASTIGMATISM AND PRESBYOPIA, BILATERAL: ICD-10-CM

## 2025-06-03 DIAGNOSIS — H52.4 HYPEROPIA WITH ASTIGMATISM AND PRESBYOPIA, BILATERAL: ICD-10-CM

## 2025-06-03 DIAGNOSIS — H25.13 NUCLEAR SCLEROSIS, BILATERAL: Primary | ICD-10-CM

## 2025-06-03 PROCEDURE — 3044F HG A1C LEVEL LT 7.0%: CPT | Mod: CPTII,S$GLB,, | Performed by: OPTOMETRIST

## 2025-06-03 PROCEDURE — 4010F ACE/ARB THERAPY RXD/TAKEN: CPT | Mod: CPTII,S$GLB,, | Performed by: OPTOMETRIST

## 2025-06-03 PROCEDURE — 1160F RVW MEDS BY RX/DR IN RCRD: CPT | Mod: CPTII,S$GLB,, | Performed by: OPTOMETRIST

## 2025-06-03 PROCEDURE — 99999 PR PBB SHADOW E&M-EST. PATIENT-LVL III: CPT | Mod: PBBFAC,,, | Performed by: OPTOMETRIST

## 2025-06-03 PROCEDURE — 1159F MED LIST DOCD IN RCRD: CPT | Mod: CPTII,S$GLB,, | Performed by: OPTOMETRIST

## 2025-06-03 PROCEDURE — 92015 DETERMINE REFRACTIVE STATE: CPT | Mod: S$GLB,,, | Performed by: OPTOMETRIST

## 2025-06-03 PROCEDURE — 92004 COMPRE OPH EXAM NEW PT 1/>: CPT | Mod: S$GLB,,, | Performed by: OPTOMETRIST

## 2025-06-17 RX ORDER — SERTRALINE HYDROCHLORIDE 100 MG/1
100 TABLET, FILM COATED ORAL
Qty: 90 TABLET | Refills: 0 | Status: SHIPPED | OUTPATIENT
Start: 2025-06-17

## 2025-07-01 ENCOUNTER — HOSPITAL ENCOUNTER (OUTPATIENT)
Dept: PULMONOLOGY | Facility: HOSPITAL | Age: 70
Discharge: HOME | End: 2025-07-01
Attending: INTERNAL MEDICINE
Payer: COMMERCIAL

## 2025-07-01 ENCOUNTER — HOSPITAL ENCOUNTER (OUTPATIENT)
Dept: RADIOLOGY | Facility: HOSPITAL | Age: 70
Discharge: HOME | End: 2025-07-01
Attending: INTERNAL MEDICINE
Payer: COMMERCIAL

## 2025-07-01 ENCOUNTER — HOSPITAL ENCOUNTER (OUTPATIENT)
Dept: CARDIOLOGY | Facility: HOSPITAL | Age: 70
Discharge: HOME | End: 2025-07-01
Attending: INTERNAL MEDICINE
Payer: COMMERCIAL

## 2025-07-01 VITALS
WEIGHT: 244 LBS | DIASTOLIC BLOOD PRESSURE: 80 MMHG | BODY MASS INDEX: 32.34 KG/M2 | SYSTOLIC BLOOD PRESSURE: 118 MMHG | HEIGHT: 73 IN

## 2025-07-01 VITALS
HEIGHT: 73 IN | WEIGHT: 244 LBS | SYSTOLIC BLOOD PRESSURE: 110 MMHG | DIASTOLIC BLOOD PRESSURE: 74 MMHG | HEART RATE: 65 BPM | BODY MASS INDEX: 32.34 KG/M2

## 2025-07-01 DIAGNOSIS — I70.0 AORTIC ATHEROSCLEROSIS: ICD-10-CM

## 2025-07-01 LAB
AORTIC ROOT ANNULUS: 3.4 CM
AORTIC SIZE INDEX: 1.7 CM/M2
ASCENDING AORTA: 3.9 CM
AV INDEX (PROSTH): 0.8
AV MEAN GRADIENT: 3 MMHG
AV PEAK GRADIENT: 5 MMHG
AV REGURGITATION PRESSURE HALF TIME: 585 MS
AV VALVE AREA BY VELOCITY RATIO: 3.8 CM²
AV VALVE AREA: 3.3 CM²
AV VELOCITY RATIO: 0.91
BSA FOR ECHO PROCEDURE: 2.39 M2
CV ECHO LV RWT: 0.36 CM
DOP CALC AO PEAK VEL: 1.1 M/S
DOP CALC AO VTI: 24.8 CM
DOP CALC LVOT AREA: 4.2 CM2
DOP CALC LVOT DIAMETER: 2.3 CM
DOP CALC LVOT PEAK VEL: 1 M/S
DOP CALC LVOT STROKE VOLUME: 82.2 CM3
DOP CALC RVOT PEAK VEL: 0.6 M/S
DOP CALC RVOT VTI: 11.8 CM
DOP CALCLVOT PEAK VEL VTI: 19.8 CM
E WAVE DECELERATION TIME: 278 MSEC
E/A RATIO: 0.63
E/E' RATIO: 5 M/S
ECHO LV POSTERIOR WALL: 0.9 CM (ref 0.6–1.1)
FRACTIONAL SHORTENING: 34 % (ref 28–44)
INTERVENTRICULAR SEPTUM: 1.1 CM (ref 0.6–1.1)
IVC DIAMETER: 1.91 CM
IVRT: 126 MSEC
LA MAJOR: 4 CM
LA MINOR: 4.8 CM
LA WIDTH: 3.6 CM
LEFT ATRIUM AREA SYSTOLIC (APICAL 2 CHAMBER): 18.18 CM2
LEFT ATRIUM AREA SYSTOLIC (APICAL 4 CHAMBER): 18.53 CM2
LEFT ATRIUM SIZE: 3.5 CM
LEFT ATRIUM VOLUME INDEX MOD: 23 ML/M2
LEFT ATRIUM VOLUME INDEX: 20 ML/M2
LEFT ATRIUM VOLUME MOD: 54 ML
LEFT ATRIUM VOLUME: 47 CM3
LEFT INTERNAL DIMENSION IN SYSTOLE: 3.3 CM (ref 2.1–4)
LEFT VENTRICLE DIASTOLIC VOLUME INDEX: 51.71 ML/M2
LEFT VENTRICLE DIASTOLIC VOLUME: 121 ML
LEFT VENTRICLE END SYSTOLIC VOLUME APICAL 2 CHAMBER: 54.16 ML
LEFT VENTRICLE END SYSTOLIC VOLUME APICAL 4 CHAMBER: 52.98 ML
LEFT VENTRICLE MASS INDEX: 77.8 G/M2
LEFT VENTRICLE SYSTOLIC VOLUME INDEX: 18.4 ML/M2
LEFT VENTRICLE SYSTOLIC VOLUME: 43 ML
LEFT VENTRICULAR INTERNAL DIMENSION IN DIASTOLE: 5 CM (ref 3.5–6)
LEFT VENTRICULAR MASS: 182 G
LV LATERAL E/E' RATIO: 4.7 M/S
LV SEPTAL E/E' RATIO: 5.3 M/S
LVED V (TEICH): 120.6 ML
LVES V (TEICH): 42.6 ML
LVOT MG: 2.3 MMHG
LVOT MV: 0.74 CM/S
MV PEAK A VEL: 0.67 M/S
MV PEAK E VEL: 0.42 M/S
PISA AR MAX VEL: 3.89 M/S
PV MEAN GRADIENT: 1 MMHG
PV PEAK GRADIENT: 3 MMHG
PV PEAK VELOCITY: 0.85 M/S
RA MAJOR: 3.58 CM
RA PRESSURE ESTIMATED: 3 MMHG
RA WIDTH: 3 CM
STJ: 3.6 CM
TDI LATERAL: 0.09 M/S
TDI SEPTAL: 0.08 M/S
TDI: 0.09 M/S
TRICUSPID ANNULAR PLANE SYSTOLIC EXCURSION: 2.5 CM
Z-SCORE OF LEFT VENTRICULAR DIMENSION IN END DIASTOLE: -6.56
Z-SCORE OF LEFT VENTRICULAR DIMENSION IN END SYSTOLE: -4.45

## 2025-07-01 PROCEDURE — 93306 TTE W/DOPPLER COMPLETE: CPT

## 2025-07-01 PROCEDURE — 63600175 PHARM REV CODE 636 W HCPCS: Performed by: INTERNAL MEDICINE

## 2025-07-01 PROCEDURE — A9502 TC99M TETROFOSMIN: HCPCS | Performed by: INTERNAL MEDICINE

## 2025-07-01 PROCEDURE — 93306 TTE W/DOPPLER COMPLETE: CPT | Mod: 26,,, | Performed by: INTERNAL MEDICINE

## 2025-07-01 PROCEDURE — 78452 HT MUSCLE IMAGE SPECT MULT: CPT

## 2025-07-01 RX ORDER — REGADENOSON 0.08 MG/ML
0.4 INJECTION, SOLUTION INTRAVENOUS ONCE
Status: COMPLETED | OUTPATIENT
Start: 2025-07-01 | End: 2025-07-01

## 2025-07-01 RX ADMIN — TETROFOSMIN 32.4 MILLICURIE: 1.38 INJECTION, POWDER, LYOPHILIZED, FOR SOLUTION INTRAVENOUS at 11:07

## 2025-07-01 RX ADMIN — TETROFOSMIN 11.5 MILLICURIE: 1.38 INJECTION, POWDER, LYOPHILIZED, FOR SOLUTION INTRAVENOUS at 09:07

## 2025-07-01 RX ADMIN — REGADENOSON 0.4 MG: 0.08 INJECTION, SOLUTION INTRAVENOUS at 11:07

## 2025-07-02 ENCOUNTER — RESULTS FOLLOW-UP (OUTPATIENT)
Dept: CARDIOLOGY | Facility: CLINIC | Age: 70
End: 2025-07-02
Payer: COMMERCIAL

## 2025-07-02 LAB
CV STRESS BASE HR: 64 BPM
DIASTOLIC BLOOD PRESSURE: 74 MMHG
EJECTION FRACTION- HIGH: 73 %
END DIASTOLIC INDEX-HIGH: 165 ML/M2
END DIASTOLIC INDEX-LOW: 101 ML/M2
END SYSTOLIC INDEX-HIGH: 64 ML/M2
END SYSTOLIC INDEX-LOW: 28 ML/M2
NUC REST EJECTION FRACTION: 63
NUC STRESS EJECTION FRACTION: 70 %
OHS CV CPX 85 PERCENT MAX PREDICTED HEART RATE MALE: 128
OHS CV CPX MAX PREDICTED HEART RATE: 150
OHS CV CPX PATIENT IS FEMALE: 0
OHS CV CPX PATIENT IS MALE: 1
OHS CV CPX PEAK DIASTOLIC BLOOD PRESSURE: 74 MMHG
OHS CV CPX PEAK HEAR RATE: 85 BPM
OHS CV CPX PEAK RATE PRESSURE PRODUCT: 9350
OHS CV CPX PEAK SYSTOLIC BLOOD PRESSURE: 110 MMHG
OHS CV CPX PERCENT MAX PREDICTED HEART RATE ACHIEVED: 57
OHS CV CPX RATE PRESSURE PRODUCT PRESENTING: 7040
OHS CV INITIAL DOSE: 11.5 MCG/KG/MIN
OHS CV PEAK DOSE: 32.4 MCG/KG/MIN
RETIRED EF AND QEF - SEE NOTES: 59 %
SYSTOLIC BLOOD PRESSURE: 110 MMHG

## 2025-07-10 ENCOUNTER — LAB VISIT (OUTPATIENT)
Dept: LAB | Facility: HOSPITAL | Age: 70
End: 2025-07-10
Attending: INTERNAL MEDICINE
Payer: COMMERCIAL

## 2025-07-10 ENCOUNTER — OFFICE VISIT (OUTPATIENT)
Dept: CARDIOLOGY | Facility: CLINIC | Age: 70
End: 2025-07-10
Payer: COMMERCIAL

## 2025-07-10 VITALS
HEART RATE: 65 BPM | BODY MASS INDEX: 32.84 KG/M2 | DIASTOLIC BLOOD PRESSURE: 80 MMHG | SYSTOLIC BLOOD PRESSURE: 117 MMHG | OXYGEN SATURATION: 96 % | WEIGHT: 248.88 LBS

## 2025-07-10 DIAGNOSIS — I10 PRIMARY HYPERTENSION: ICD-10-CM

## 2025-07-10 DIAGNOSIS — R94.39 ABNORMAL STRESS TEST: ICD-10-CM

## 2025-07-10 DIAGNOSIS — E78.5 HYPERLIPIDEMIA, UNSPECIFIED HYPERLIPIDEMIA TYPE: ICD-10-CM

## 2025-07-10 DIAGNOSIS — Z82.49 FAMILY HISTORY OF CABG: ICD-10-CM

## 2025-07-10 DIAGNOSIS — I10 ESSENTIAL HYPERTENSION: Primary | ICD-10-CM

## 2025-07-10 DIAGNOSIS — I70.0 AORTIC ATHEROSCLEROSIS: ICD-10-CM

## 2025-07-10 DIAGNOSIS — Z91.89 SEDENTARY LIFESTYLE: ICD-10-CM

## 2025-07-10 LAB
ANION GAP (OHS): 11 MMOL/L (ref 8–16)
BUN SERPL-MCNC: 19 MG/DL (ref 8–23)
CALCIUM SERPL-MCNC: 9.3 MG/DL (ref 8.7–10.5)
CHLORIDE SERPL-SCNC: 103 MMOL/L (ref 95–110)
CO2 SERPL-SCNC: 28 MMOL/L (ref 23–29)
CREAT SERPL-MCNC: 1.2 MG/DL (ref 0.5–1.4)
ERYTHROCYTE [DISTWIDTH] IN BLOOD BY AUTOMATED COUNT: 13.6 % (ref 11.5–14.5)
GFR SERPLBLD CREATININE-BSD FMLA CKD-EPI: >60 ML/MIN/1.73/M2
GLUCOSE SERPL-MCNC: 101 MG/DL (ref 70–110)
HCT VFR BLD AUTO: 46.9 % (ref 40–54)
HGB BLD-MCNC: 14.8 GM/DL (ref 14–18)
INR PPP: 1 (ref 0.8–1.2)
MCH RBC QN AUTO: 29.4 PG (ref 27–31)
MCHC RBC AUTO-ENTMCNC: 31.6 G/DL (ref 32–36)
MCV RBC AUTO: 93 FL (ref 82–98)
PLATELET # BLD AUTO: 348 K/UL (ref 150–450)
PMV BLD AUTO: 9.3 FL (ref 9.2–12.9)
POTASSIUM SERPL-SCNC: 3.8 MMOL/L (ref 3.5–5.1)
PROTHROMBIN TIME: 10.8 SECONDS (ref 9–12.5)
RBC # BLD AUTO: 5.04 M/UL (ref 4.6–6.2)
SODIUM SERPL-SCNC: 142 MMOL/L (ref 136–145)
WBC # BLD AUTO: 6.38 K/UL (ref 3.9–12.7)

## 2025-07-10 PROCEDURE — 3008F BODY MASS INDEX DOCD: CPT | Mod: CPTII,S$GLB,, | Performed by: INTERNAL MEDICINE

## 2025-07-10 PROCEDURE — 3074F SYST BP LT 130 MM HG: CPT | Mod: CPTII,S$GLB,, | Performed by: INTERNAL MEDICINE

## 2025-07-10 PROCEDURE — 99214 OFFICE O/P EST MOD 30 MIN: CPT | Mod: S$GLB,,, | Performed by: INTERNAL MEDICINE

## 2025-07-10 PROCEDURE — 99999 PR PBB SHADOW E&M-EST. PATIENT-LVL III: CPT | Mod: PBBFAC,,, | Performed by: INTERNAL MEDICINE

## 2025-07-10 PROCEDURE — 80048 BASIC METABOLIC PNL TOTAL CA: CPT

## 2025-07-10 PROCEDURE — 85027 COMPLETE CBC AUTOMATED: CPT

## 2025-07-10 PROCEDURE — 1126F AMNT PAIN NOTED NONE PRSNT: CPT | Mod: CPTII,S$GLB,, | Performed by: INTERNAL MEDICINE

## 2025-07-10 PROCEDURE — 85610 PROTHROMBIN TIME: CPT

## 2025-07-10 PROCEDURE — 3079F DIAST BP 80-89 MM HG: CPT | Mod: CPTII,S$GLB,, | Performed by: INTERNAL MEDICINE

## 2025-07-10 PROCEDURE — 36415 COLL VENOUS BLD VENIPUNCTURE: CPT

## 2025-07-10 PROCEDURE — 3044F HG A1C LEVEL LT 7.0%: CPT | Mod: CPTII,S$GLB,, | Performed by: INTERNAL MEDICINE

## 2025-07-10 PROCEDURE — 1101F PT FALLS ASSESS-DOCD LE1/YR: CPT | Mod: CPTII,S$GLB,, | Performed by: INTERNAL MEDICINE

## 2025-07-10 PROCEDURE — 3288F FALL RISK ASSESSMENT DOCD: CPT | Mod: CPTII,S$GLB,, | Performed by: INTERNAL MEDICINE

## 2025-07-10 PROCEDURE — 4010F ACE/ARB THERAPY RXD/TAKEN: CPT | Mod: CPTII,S$GLB,, | Performed by: INTERNAL MEDICINE

## 2025-07-10 PROCEDURE — 1159F MED LIST DOCD IN RCRD: CPT | Mod: CPTII,S$GLB,, | Performed by: INTERNAL MEDICINE

## 2025-07-10 NOTE — PROGRESS NOTES
Subjective:   Patient ID:  07/10/2025      Byron Styles is a 70 y.o. male who presents for evaluation of Follow-up (Discuss heart cath)      History of Present Illness    CHIEF COMPLAINT:  Patient presents today for follow up of abnormal stress test    CARDIOVASCULAR:  He denies chest pain and dyspnea. 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 He currently does not engage in any walking or physical activities and reports feeling physically tired from his work history.    MEDICATIONS:  He is adherent to his current medication regimen which includes Nadolol 1 mg, ASA 81 mg, and a statin medication.    FAMILY HISTORY:  He has a significant family history of cardiac conditions.         Follow-up  Pertinent negatives include no chest pain.       Past Medical History:   Diagnosis Date    Colon polyps 2009    Depression     Hyperlipidemia     Hypertension     Localized osteoarthrosis not specified whether primary or secondary, lower leg 4/6/2015    Raynaud's disease     Skin cancer of arm, left 05/2020    [facility]       Past Surgical History:   Procedure Laterality Date    COLONOSCOPY W/ POLYPECTOMY  2009    MOUTH SURGERY      WISDOM TOOTH EXTRACTION         Social History[1]    Family History   Problem Relation Name Age of Onset    Heart disease Father  65        CABG    Cancer Father          Bladder    Heart disease Brother  68        CABG    Depression Mother      Heart disease Mother          CHF         Review of Systems   Cardiovascular:  Negative for chest pain, dyspnea on exertion, palpitations and syncope.   Neurological:  Negative for focal weakness.       Current Outpatient Medications on File Prior to Visit   Medication Sig    aspirin (ECOTRIN) 81 MG EC tablet Take 81 mg by mouth once daily.    atorvastatin (LIPITOR) 40 MG tablet TAKE 1 TABLET BY MOUTH ONCE DAILY IN THE EVENING    febuxostat  (ULORIC) 40 mg Tab Take 1 tablet by mouth once daily    fluorouraciL (EFUDEX) 5 % cream Apply topically 2 (two) times daily.    gabapentin (NEURONTIN) 100 MG capsule Take 100 mg by mouth 2 (two) times daily.    lisinopriL-hydrochlorothiazide (PRINZIDE,ZESTORETIC) 20-12.5 mg per tablet Take 1 tablet by mouth once daily    meloxicam (MOBIC) 15 MG tablet Take 1 tablet by mouth once daily    multivitamin capsule Take 1 capsule by mouth once daily.    nadoloL (CORGARD) 20 MG tablet Take 1 tablet by mouth once daily    sertraline (ZOLOFT) 100 MG tablet Take 1 tablet by mouth once daily     No current facility-administered medications on file prior to visit.       Objective:   Objective:  Wt Readings from Last 3 Encounters:   07/10/25 112.9 kg (248 lb 14.4 oz)   07/01/25 110.7 kg (244 lb)   07/01/25 110.7 kg (244 lb)     Temp Readings from Last 3 Encounters:   03/11/25 96.2 °F (35.7 °C) (Tympanic)   12/19/23 96.3 °F (35.7 °C)   08/30/22 98.4 °F (36.9 °C) (Temporal)     BP Readings from Last 3 Encounters:   07/10/25 117/80   07/01/25 110/74   07/01/25 118/80     Pulse Readings from Last 3 Encounters:   07/10/25 65   07/01/25 65   05/20/25 75       Physical Exam  Vitals reviewed.   Constitutional:       Appearance: He is well-developed.   Neck:      Vascular: No carotid bruit.   Cardiovascular:      Rate and Rhythm: Normal rate and regular rhythm.      Pulses: Intact distal pulses.      Heart sounds: Normal heart sounds. No murmur heard.  Pulmonary:      Breath sounds: Normal breath sounds.   Neurological:      Mental Status: He is oriented to person, place, and time.           Lab Results   Component Value Date    CHOL 147 03/11/2025    CHOL 174 12/19/2023    CHOL 176 08/30/2022     Lab Results   Component Value Date    LDLCALC 67.4 03/11/2025    LDLCALC 97.6 12/19/2023    LDLCALC 100.2 08/30/2022         Chemistry        Component Value Date/Time     03/11/2025 1316    K 3.8 03/11/2025 1316     03/11/2025 1316  "   CO2 25 03/11/2025 1316    BUN 12 03/11/2025 1316    CREATININE 1.0 03/11/2025 1316    GLU 64 (L) 03/11/2025 1316        Component Value Date/Time    CALCIUM 9.4 03/11/2025 1316    ALKPHOS 83 03/11/2025 1316    AST 36 03/11/2025 1316    ALT 23 03/11/2025 1316    BILITOT 0.6 03/11/2025 1316    ESTGFRAFRICA >60.0 08/26/2021 1204    EGFRNONAA >60.0 08/26/2021 1204          No results found for: "TSH"    Lab Results   Component Value Date    WBC 6.43 03/11/2025    HGB 15.1 03/11/2025    HCT 47.6 03/11/2025    MCV 92 03/11/2025     03/11/2025       @LABRCNTIP(BNP,BNPTRIAGEBLO)@       Imaging:  ======    No results found for this or any previous visit.    No results found for this or any previous visit.    No results found for this or any previous visit.      No results found for this or any previous visit.      No valid procedures specified.        Results for orders placed during the hospital encounter of 07/01/25    Nuclear Stress - Cardiology Interpreted    Interpretation Summary    Abnormal myocardial perfusion scan.    There is a severe intensity, medium sized, reversible perfusion abnormality that is consistent with ischemia in the basal to distal inferior wall(s).    There are no other significant perfusion abnormalities.    The gated perfusion images showed an ejection fraction of 63% at rest. The gated perfusion images showed an ejection fraction of 70% post stress. Normal ejection fraction is greater than 59%.    The ECG portion of the study is negative for ischemia.    There were no arrhythmias during stress.      Results for orders placed during the hospital encounter of 07/01/25    Echo    Interpretation Summary    Left Ventricle: The left ventricle is normal in size. Normal wall thickness. There is normal systolic function with a visually estimated ejection fraction of 60 - 65%. Grade I diastolic dysfunction.    Right Ventricle: The right ventricle is normal in sizeWall thickness is normal. " Systolic function is normal.    Aortic Valve: There is mild aortic regurgitation.    Tricuspid Valve: There is mild regurgitation.    IVC/SVC: Normal venous pressure at 3 mmHg.      No results found for this or any previous visit.      Lab Results   Component Value Date    HGBA1C 5.5 03/11/2025         The 10-year ASCVD risk score (David THOMSON et al., 2019) is: 17.1%    Values used to calculate the score:      Age: 70 years      Sex: Male      Is Non- : No      Diabetic: No      Tobacco smoker: No      Systolic Blood Pressure: 117 mmHg      Is BP treated: Yes      HDL Cholesterol: 40 mg/dL      Total Cholesterol: 147 mg/dL    Diagnostic Results:  ECG: Reviewed    Assessment and Plan:   Essential hypertension    Aortic atherosclerosis    Family history of CABG    Primary hypertension    Hyperlipidemia, unspecified hyperlipidemia type    Sedentary lifestyle    Abnormal stress test  -     Basic Metabolic Panel; Future; Expected date: 07/10/2025  -     CBC Without Differential; Future; Expected date: 07/10/2025  -     Protime-INR; Future; Expected date: 07/10/2025        Assessment & Plan    I10 Essential hypertension  I70.0 Aortic atherosclerosis  Z82.49 Family history of CABG  E78.5 Hyperlipidemia, unspecified hyperlipidemia type  Z91.89 Sedentary lifestyle  R94.39 Abnormal stress test    Stress test abnormal, suggesting possible stenosis to back wall of heart.  Recommend heart catheterization to evaluate for significant coronary stenosis and determine if stent placement necessary.  If no major stenosis found, will continue medical treatment.  If significant stenosis identified, may need to discuss potential CABG in future.    I10 ESSENTIAL HYPERTENSION:  - Continued nadolol.    I70.0 AORTIC ATHEROSCLEROSIS:  - Explained heart catheterization procedure: Typically performed via radial artery access, uses fluoroscopy to visualize coronary arteries, usually outpatient procedure even if stent  placement required, procedure takes about 5 minutes for imaging, up to 40 minutes if stent placement needed.  - Discussed coronary artery anatomy and distribution of blood supply to heart.  - Explained process of atherosclerosis in arteries leading to restricted blood flow.  - Ordered heart catheterization for August 1st with arranged extended recovery (overnight stay) after procedure due to lack of transportation.  - Continued aspirin 81 mg.  - Follow up for heart catheterization on August 1st.    E78.5 HYPERLIPIDEMIA, UNSPECIFIED HYPERLIPIDEMIA TYPE:  - Continued statin.    R94.39 ABNORMAL STRESS TEST:  - Ordered heart catheterization for August 1st with arranged extended recovery (overnight stay) after procedure due to lack of transportation.  - Follow up for heart catheterization on August 1st.               Reviewed all tests and above medical conditions with patient in detail and formulated treatment plan.  Healthy weight goals were discussed in clinic    Follow up in  6 months         [1]   Social History  Tobacco Use    Smoking status: Never     Passive exposure: Never    Smokeless tobacco: Current     Types: Chew   Substance Use Topics    Alcohol use: Yes     Comment: social/ occassional    Drug use: No

## 2025-07-10 NOTE — H&P (VIEW-ONLY)
Subjective:   Patient ID:  07/10/2025      Byron Styles is a 70 y.o. male who presents for evaluation of Follow-up (Discuss heart cath)      History of Present Illness    CHIEF COMPLAINT:  Patient presents today for follow up of abnormal stress test    CARDIOVASCULAR:  He denies chest pain and dyspnea. 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 He currently does not engage in any walking or physical activities and reports feeling physically tired from his work history.    MEDICATIONS:  He is adherent to his current medication regimen which includes Nadolol 1 mg, ASA 81 mg, and a statin medication.    FAMILY HISTORY:  He has a significant family history of cardiac conditions.         Follow-up  Pertinent negatives include no chest pain.       Past Medical History:   Diagnosis Date    Colon polyps 2009    Depression     Hyperlipidemia     Hypertension     Localized osteoarthrosis not specified whether primary or secondary, lower leg 4/6/2015    Raynaud's disease     Skin cancer of arm, left 05/2020    [facility]       Past Surgical History:   Procedure Laterality Date    COLONOSCOPY W/ POLYPECTOMY  2009    MOUTH SURGERY      WISDOM TOOTH EXTRACTION         Social History[1]    Family History   Problem Relation Name Age of Onset    Heart disease Father  65        CABG    Cancer Father          Bladder    Heart disease Brother  68        CABG    Depression Mother      Heart disease Mother          CHF         Review of Systems   Cardiovascular:  Negative for chest pain, dyspnea on exertion, palpitations and syncope.   Neurological:  Negative for focal weakness.       Current Outpatient Medications on File Prior to Visit   Medication Sig    aspirin (ECOTRIN) 81 MG EC tablet Take 81 mg by mouth once daily.    atorvastatin (LIPITOR) 40 MG tablet TAKE 1 TABLET BY MOUTH ONCE DAILY IN THE EVENING    febuxostat  (ULORIC) 40 mg Tab Take 1 tablet by mouth once daily    fluorouraciL (EFUDEX) 5 % cream Apply topically 2 (two) times daily.    gabapentin (NEURONTIN) 100 MG capsule Take 100 mg by mouth 2 (two) times daily.    lisinopriL-hydrochlorothiazide (PRINZIDE,ZESTORETIC) 20-12.5 mg per tablet Take 1 tablet by mouth once daily    meloxicam (MOBIC) 15 MG tablet Take 1 tablet by mouth once daily    multivitamin capsule Take 1 capsule by mouth once daily.    nadoloL (CORGARD) 20 MG tablet Take 1 tablet by mouth once daily    sertraline (ZOLOFT) 100 MG tablet Take 1 tablet by mouth once daily     No current facility-administered medications on file prior to visit.       Objective:   Objective:  Wt Readings from Last 3 Encounters:   07/10/25 112.9 kg (248 lb 14.4 oz)   07/01/25 110.7 kg (244 lb)   07/01/25 110.7 kg (244 lb)     Temp Readings from Last 3 Encounters:   03/11/25 96.2 °F (35.7 °C) (Tympanic)   12/19/23 96.3 °F (35.7 °C)   08/30/22 98.4 °F (36.9 °C) (Temporal)     BP Readings from Last 3 Encounters:   07/10/25 117/80   07/01/25 110/74   07/01/25 118/80     Pulse Readings from Last 3 Encounters:   07/10/25 65   07/01/25 65   05/20/25 75       Physical Exam  Vitals reviewed.   Constitutional:       Appearance: He is well-developed.   Neck:      Vascular: No carotid bruit.   Cardiovascular:      Rate and Rhythm: Normal rate and regular rhythm.      Pulses: Intact distal pulses.      Heart sounds: Normal heart sounds. No murmur heard.  Pulmonary:      Breath sounds: Normal breath sounds.   Neurological:      Mental Status: He is oriented to person, place, and time.           Lab Results   Component Value Date    CHOL 147 03/11/2025    CHOL 174 12/19/2023    CHOL 176 08/30/2022     Lab Results   Component Value Date    LDLCALC 67.4 03/11/2025    LDLCALC 97.6 12/19/2023    LDLCALC 100.2 08/30/2022         Chemistry        Component Value Date/Time     03/11/2025 1316    K 3.8 03/11/2025 1316     03/11/2025 1316  "   CO2 25 03/11/2025 1316    BUN 12 03/11/2025 1316    CREATININE 1.0 03/11/2025 1316    GLU 64 (L) 03/11/2025 1316        Component Value Date/Time    CALCIUM 9.4 03/11/2025 1316    ALKPHOS 83 03/11/2025 1316    AST 36 03/11/2025 1316    ALT 23 03/11/2025 1316    BILITOT 0.6 03/11/2025 1316    ESTGFRAFRICA >60.0 08/26/2021 1204    EGFRNONAA >60.0 08/26/2021 1204          No results found for: "TSH"    Lab Results   Component Value Date    WBC 6.43 03/11/2025    HGB 15.1 03/11/2025    HCT 47.6 03/11/2025    MCV 92 03/11/2025     03/11/2025       @LABRCNTIP(BNP,BNPTRIAGEBLO)@       Imaging:  ======    No results found for this or any previous visit.    No results found for this or any previous visit.    No results found for this or any previous visit.      No results found for this or any previous visit.      No valid procedures specified.        Results for orders placed during the hospital encounter of 07/01/25    Nuclear Stress - Cardiology Interpreted    Interpretation Summary    Abnormal myocardial perfusion scan.    There is a severe intensity, medium sized, reversible perfusion abnormality that is consistent with ischemia in the basal to distal inferior wall(s).    There are no other significant perfusion abnormalities.    The gated perfusion images showed an ejection fraction of 63% at rest. The gated perfusion images showed an ejection fraction of 70% post stress. Normal ejection fraction is greater than 59%.    The ECG portion of the study is negative for ischemia.    There were no arrhythmias during stress.      Results for orders placed during the hospital encounter of 07/01/25    Echo    Interpretation Summary    Left Ventricle: The left ventricle is normal in size. Normal wall thickness. There is normal systolic function with a visually estimated ejection fraction of 60 - 65%. Grade I diastolic dysfunction.    Right Ventricle: The right ventricle is normal in sizeWall thickness is normal. " Systolic function is normal.    Aortic Valve: There is mild aortic regurgitation.    Tricuspid Valve: There is mild regurgitation.    IVC/SVC: Normal venous pressure at 3 mmHg.      No results found for this or any previous visit.      Lab Results   Component Value Date    HGBA1C 5.5 03/11/2025         The 10-year ASCVD risk score (David THOMSON et al., 2019) is: 17.1%    Values used to calculate the score:      Age: 70 years      Sex: Male      Is Non- : No      Diabetic: No      Tobacco smoker: No      Systolic Blood Pressure: 117 mmHg      Is BP treated: Yes      HDL Cholesterol: 40 mg/dL      Total Cholesterol: 147 mg/dL    Diagnostic Results:  ECG: Reviewed    Assessment and Plan:   Essential hypertension    Aortic atherosclerosis    Family history of CABG    Primary hypertension    Hyperlipidemia, unspecified hyperlipidemia type    Sedentary lifestyle    Abnormal stress test  -     Basic Metabolic Panel; Future; Expected date: 07/10/2025  -     CBC Without Differential; Future; Expected date: 07/10/2025  -     Protime-INR; Future; Expected date: 07/10/2025        Assessment & Plan    I10 Essential hypertension  I70.0 Aortic atherosclerosis  Z82.49 Family history of CABG  E78.5 Hyperlipidemia, unspecified hyperlipidemia type  Z91.89 Sedentary lifestyle  R94.39 Abnormal stress test    Stress test abnormal, suggesting possible stenosis to back wall of heart.  Recommend heart catheterization to evaluate for significant coronary stenosis and determine if stent placement necessary.  If no major stenosis found, will continue medical treatment.  If significant stenosis identified, may need to discuss potential CABG in future.    I10 ESSENTIAL HYPERTENSION:  - Continued nadolol.    I70.0 AORTIC ATHEROSCLEROSIS:  - Explained heart catheterization procedure: Typically performed via radial artery access, uses fluoroscopy to visualize coronary arteries, usually outpatient procedure even if stent  placement required, procedure takes about 5 minutes for imaging, up to 40 minutes if stent placement needed.  - Discussed coronary artery anatomy and distribution of blood supply to heart.  - Explained process of atherosclerosis in arteries leading to restricted blood flow.  - Ordered heart catheterization for August 1st with arranged extended recovery (overnight stay) after procedure due to lack of transportation.  - Continued aspirin 81 mg.  - Follow up for heart catheterization on August 1st.    E78.5 HYPERLIPIDEMIA, UNSPECIFIED HYPERLIPIDEMIA TYPE:  - Continued statin.    R94.39 ABNORMAL STRESS TEST:  - Ordered heart catheterization for August 1st with arranged extended recovery (overnight stay) after procedure due to lack of transportation.  - Follow up for heart catheterization on August 1st.               Reviewed all tests and above medical conditions with patient in detail and formulated treatment plan.  Healthy weight goals were discussed in clinic    Follow up in  6 months         [1]   Social History  Tobacco Use    Smoking status: Never     Passive exposure: Never    Smokeless tobacco: Current     Types: Chew   Substance Use Topics    Alcohol use: Yes     Comment: social/ occassional    Drug use: No

## 2025-08-01 ENCOUNTER — HOSPITAL ENCOUNTER (OUTPATIENT)
Facility: HOSPITAL | Age: 70
Discharge: HOME | End: 2025-08-01
Attending: INTERNAL MEDICINE | Admitting: INTERNAL MEDICINE
Payer: COMMERCIAL

## 2025-08-01 VITALS
OXYGEN SATURATION: 94 % | HEART RATE: 70 BPM | HEIGHT: 73 IN | SYSTOLIC BLOOD PRESSURE: 100 MMHG | TEMPERATURE: 98 F | DIASTOLIC BLOOD PRESSURE: 58 MMHG | RESPIRATION RATE: 22 BRPM | BODY MASS INDEX: 32.84 KG/M2

## 2025-08-01 DIAGNOSIS — I10 ESSENTIAL HYPERTENSION: ICD-10-CM

## 2025-08-01 DIAGNOSIS — R06.02 SOB (SHORTNESS OF BREATH): ICD-10-CM

## 2025-08-01 DIAGNOSIS — E78.5 HYPERLIPIDEMIA, UNSPECIFIED HYPERLIPIDEMIA TYPE: ICD-10-CM

## 2025-08-01 DIAGNOSIS — Z82.49 FAMILY HISTORY OF CABG: ICD-10-CM

## 2025-08-01 DIAGNOSIS — I70.0 AORTIC ATHEROSCLEROSIS: ICD-10-CM

## 2025-08-01 DIAGNOSIS — R94.39 ABNORMAL NUCLEAR STRESS TEST: ICD-10-CM

## 2025-08-01 DIAGNOSIS — Z82.49 FAMILY HISTORY OF ISCHEMIC HEART DISEASE (IHD): ICD-10-CM

## 2025-08-01 LAB — OHS CV CPX PATIENT HEIGHT IN: 73

## 2025-08-01 PROCEDURE — 99152 MOD SED SAME PHYS/QHP 5/>YRS: CPT | Performed by: INTERNAL MEDICINE

## 2025-08-01 PROCEDURE — 99152 MOD SED SAME PHYS/QHP 5/>YRS: CPT | Mod: ,,, | Performed by: INTERNAL MEDICINE

## 2025-08-01 PROCEDURE — C1894 INTRO/SHEATH, NON-LASER: HCPCS | Performed by: INTERNAL MEDICINE

## 2025-08-01 PROCEDURE — 25000003 PHARM REV CODE 250: Performed by: INTERNAL MEDICINE

## 2025-08-01 PROCEDURE — 63600175 PHARM REV CODE 636 W HCPCS: Performed by: INTERNAL MEDICINE

## 2025-08-01 PROCEDURE — 25500020 PHARM REV CODE 255: Performed by: INTERNAL MEDICINE

## 2025-08-01 PROCEDURE — C1769 GUIDE WIRE: HCPCS | Performed by: INTERNAL MEDICINE

## 2025-08-01 PROCEDURE — 93458 L HRT ARTERY/VENTRICLE ANGIO: CPT | Performed by: INTERNAL MEDICINE

## 2025-08-01 PROCEDURE — 93458 L HRT ARTERY/VENTRICLE ANGIO: CPT | Mod: 26,,, | Performed by: INTERNAL MEDICINE

## 2025-08-01 RX ORDER — NITROGLYCERIN 5 MG/ML
INJECTION, SOLUTION INTRAVENOUS
Status: DISCONTINUED | OUTPATIENT
Start: 2025-08-01 | End: 2025-08-01 | Stop reason: HOSPADM

## 2025-08-01 RX ORDER — FENTANYL CITRATE 50 UG/ML
INJECTION, SOLUTION INTRAMUSCULAR; INTRAVENOUS
Status: DISCONTINUED | OUTPATIENT
Start: 2025-08-01 | End: 2025-08-01 | Stop reason: HOSPADM

## 2025-08-01 RX ORDER — NAPROXEN SODIUM 220 MG/1
81 TABLET, FILM COATED ORAL ONCE
Status: COMPLETED | OUTPATIENT
Start: 2025-08-01 | End: 2025-08-01

## 2025-08-01 RX ORDER — SODIUM CHLORIDE 9 MG/ML
INJECTION, SOLUTION INTRAVENOUS CONTINUOUS
Status: ACTIVE | OUTPATIENT
Start: 2025-08-01 | End: 2025-08-01

## 2025-08-01 RX ORDER — LIDOCAINE HYDROCHLORIDE 20 MG/ML
INJECTION, SOLUTION INFILTRATION; PERINEURAL
Status: DISCONTINUED | OUTPATIENT
Start: 2025-08-01 | End: 2025-08-01 | Stop reason: HOSPADM

## 2025-08-01 RX ORDER — HEPARIN SODIUM 1000 [USP'U]/ML
INJECTION, SOLUTION INTRAVENOUS; SUBCUTANEOUS
Status: DISCONTINUED | OUTPATIENT
Start: 2025-08-01 | End: 2025-08-01 | Stop reason: HOSPADM

## 2025-08-01 RX ORDER — MIDAZOLAM HYDROCHLORIDE 1 MG/ML
INJECTION, SOLUTION INTRAMUSCULAR; INTRAVENOUS
Status: DISCONTINUED | OUTPATIENT
Start: 2025-08-01 | End: 2025-08-01 | Stop reason: HOSPADM

## 2025-08-01 RX ORDER — DIPHENHYDRAMINE HCL 50 MG
50 CAPSULE ORAL ONCE
Status: COMPLETED | OUTPATIENT
Start: 2025-08-01 | End: 2025-08-01

## 2025-08-01 RX ORDER — SODIUM CHLORIDE 9 MG/ML
INJECTION, SOLUTION INTRAVENOUS CONTINUOUS
Status: DISCONTINUED | OUTPATIENT
Start: 2025-08-01 | End: 2025-08-01 | Stop reason: HOSPADM

## 2025-08-01 RX ORDER — ACETAMINOPHEN 325 MG/1
650 TABLET ORAL EVERY 4 HOURS PRN
Status: DISCONTINUED | OUTPATIENT
Start: 2025-08-01 | End: 2025-08-01 | Stop reason: HOSPADM

## 2025-08-01 RX ORDER — VERAPAMIL HYDROCHLORIDE 2.5 MG/ML
INJECTION INTRAVENOUS
Status: DISCONTINUED | OUTPATIENT
Start: 2025-08-01 | End: 2025-08-01 | Stop reason: HOSPADM

## 2025-08-01 RX ORDER — ONDANSETRON 8 MG/1
8 TABLET, ORALLY DISINTEGRATING ORAL EVERY 8 HOURS PRN
Status: DISCONTINUED | OUTPATIENT
Start: 2025-08-01 | End: 2025-08-01 | Stop reason: HOSPADM

## 2025-08-01 RX ORDER — PHENYLEPHRINE HYDROCHLORIDE 10 MG/ML
INJECTION INTRAVENOUS
Status: DISCONTINUED | OUTPATIENT
Start: 2025-08-01 | End: 2025-08-01 | Stop reason: HOSPADM

## 2025-08-01 RX ORDER — SODIUM CHLORIDE 9 MG/ML
INJECTION, SOLUTION INTRAVENOUS
Status: DISCONTINUED | OUTPATIENT
Start: 2025-08-01 | End: 2025-08-01 | Stop reason: HOSPADM

## 2025-08-01 RX ADMIN — DIPHENHYDRAMINE HYDROCHLORIDE 50 MG: 50 CAPSULE ORAL at 06:08

## 2025-08-01 RX ADMIN — SODIUM CHLORIDE: 9 INJECTION, SOLUTION INTRAVENOUS at 06:08

## 2025-08-01 RX ADMIN — ASPIRIN 81 MG CHEWABLE TABLET 81 MG: 81 TABLET CHEWABLE at 06:08

## 2025-08-01 NOTE — PLAN OF CARE
Discharge instructions rev'd w/ pt. Able to teach back. L wrist immobilizer secure, 2x2 dressing CDI, no bleeding or hematoma. Dr. Fitzpatrick aware pt is obtaining [employer] for discharge and lives alone. IV removed. Pt wheeled to [employer] ride.

## 2025-08-01 NOTE — DISCHARGE SUMMARY
[facility] ([facility])  Cardiology  Discharge Summary      Patient Name: Byron Styles  MRN: [id number]  Admission Date: 8/1/2025  Hospital Length of Stay: 0 days  Discharge Date and Time: 08/01/2025 8:06 AM  Attending Physician: Nallely Fitzpatrick MD    Discharging Provider: Nallely Fitzpatrick MD  Primary Care Physician: Ravindra Bernard MD    HPI:   No notes on file    Procedure(s) (LRB):  Angiogram, Coronary, with Left Heart Cath (N/A)     Indwelling Lines/Drains at time of discharge:  Lines/Drains/Airways       None                   Hospital Course:  No notes on file    Goals of Care Treatment Preferences:         Consults:     Significant Diagnostic Studies: Angiography: Results for orders placed during the hospital encounter of 08/01/25    Cardiac catheterization    Conclusion    The pre-procedure left ventricular end diastolic pressure was 13.    The estimated blood loss was none.    There was non-obstructive coronary artery disease..    The filling pressures on the left were mildly elevated.    The procedure log was documented by Documenter: Sanaz Jacome RN and verified by Nallely Fitzpatrick MD.    Date: 8/1/2025  Time: 8:03 AM      Pending Diagnostic Studies:       None            There are no hospital problems to display for this patient.    Assessment & plan notes cannot be loaded without a specified hospital service.      Discharged Condition: good    Disposition: Home or Self Care    Follow Up:    Patient Instructions:   No discharge procedures on file.  Medications:  Reconciled Home Medications:      Medication List        ASK your doctor about these medications      aspirin 81 MG EC tablet  Commonly known as: ECOTRIN  Take 81 mg by mouth once daily.     atorvastatin 40 MG tablet  Commonly known as: LIPITOR  TAKE 1 TABLET BY MOUTH ONCE DAILY IN THE EVENING     febuxostat 40 mg Tab  Commonly known as: ULORIC  Take 1 tablet by mouth once daily     fluorouraciL 5 % cream  Commonly known as:  EFUDEX  Apply topically 2 (two) times daily.     gabapentin 100 MG capsule  Commonly known as: NEURONTIN  Take 100 mg by mouth 2 (two) times daily.     lisinopriL-hydrochlorothiazide 20-12.5 mg per tablet  Commonly known as: PRINZIDE,ZESTORETIC  Take 1 tablet by mouth once daily     meloxicam 15 MG tablet  Commonly known as: MOBIC  Take 1 tablet by mouth once daily     multivitamin capsule  Take 1 capsule by mouth once daily.     nadoloL 20 MG tablet  Commonly known as: CORGARD  Take 1 tablet by mouth once daily     sertraline 100 MG tablet  Commonly known as: ZOLOFT  Take 1 tablet by mouth once daily              Time spent on the discharge of patient: 30 minutes    Nallely Fitzpatrick MD  Cardiology  [facility] ([facility])

## 2025-08-01 NOTE — DISCHARGE INSTRUCTIONS
"Post-op Heart Catheterization    1. DIET: It is advisable for you to follow a diet that limits the intake of salt, sugar, saturated fats and cholesterol.     2. DRIVING: Due to sedation you received during your procedure, DO NOT drive or operate machinery for 24 hours. Avoid making critical decisions or signing legal documents until tomorrow.    3. ACTIVITY: AVOID activities that require bending of the affected arm/wrist for 2 days and submerging the site in water for 2 days. REMOVE the dressing the day after  the procedure, you may apply a bandaid to the site if you desire. You may RESUME your normal activities or prescribed exercise program as instructed by your physician after 3 days.                                                                                                                 4. WOUND CARE: It is not unusual to have a small amount of bruising to appear at or near the puncture site. It is also common to have a tender "knot" develop beneath the skin at the puncture site of the wrist/arm. This is usually scar tissue and is not a cause for concern or alarm. This tender knot may take several weeks to fully resolve. The bruise will usually spread over several days. However, if the lump gets bigger, call your doctor immediately.    5. DISCOMFORT: For general discomfort at the puncture site, you may take 1 or 2 Acetaminophen (Tylenol) tablets every 4 - 6 hours as needed. (Do not take more than 4000 mg a day)    6. SIGNS AND SYMPTOMS TO REPORT:  Call your physician immediately if any of the following occur:                                            1. Loss of feeling, warmth or color to the affected arm/wrist                                                                                                          2. Mild beeding from the site                 3. Pain that is sudden, sharp or persistent in the affected arm/wrist                 4. Swelling or a change in "lump" size, increased redness or " drainage at                               the puncture site                                                                               5. High fever (101 degrees or higher)    7. GO TO  THE EMERGENCY ROOM OR CALL 911 IF YOU HAVE: Chest pains or discomforts not relieved with 3 nitroglycerin doses (sublingual tablets or spray), numbness or severe pain of limb, if your limb becomes cold or discolored or if you develop uncontrolled bleeding from the puncture site (quickly apply firm, direct pressure above the site).

## 2025-08-12 DIAGNOSIS — R94.39 ABNORMAL STRESS TEST: Primary | ICD-10-CM

## 2025-08-12 DIAGNOSIS — Z91.89 SEDENTARY LIFESTYLE: ICD-10-CM

## 2025-08-13 ENCOUNTER — OFFICE VISIT (OUTPATIENT)
Dept: CARDIOLOGY | Facility: CLINIC | Age: 70
End: 2025-08-13
Payer: COMMERCIAL

## 2025-08-13 ENCOUNTER — HOSPITAL ENCOUNTER (OUTPATIENT)
Dept: CARDIOLOGY | Facility: HOSPITAL | Age: 70
Discharge: HOME | End: 2025-08-13
Payer: COMMERCIAL

## 2025-08-13 VITALS
WEIGHT: 251.75 LBS | WEIGHT: 251.75 LBS | BODY MASS INDEX: 33.22 KG/M2 | SYSTOLIC BLOOD PRESSURE: 104 MMHG | OXYGEN SATURATION: 97 % | HEART RATE: 74 BPM | HEIGHT: 73 IN | DIASTOLIC BLOOD PRESSURE: 68 MMHG | BODY MASS INDEX: 33.36 KG/M2

## 2025-08-13 DIAGNOSIS — Z91.89 SEDENTARY LIFESTYLE: ICD-10-CM

## 2025-08-13 DIAGNOSIS — I10 ESSENTIAL HYPERTENSION: ICD-10-CM

## 2025-08-13 DIAGNOSIS — E78.2 MIXED HYPERLIPIDEMIA: ICD-10-CM

## 2025-08-13 DIAGNOSIS — Z82.49 FAMILY HISTORY OF ISCHEMIC HEART DISEASE (IHD): ICD-10-CM

## 2025-08-13 DIAGNOSIS — I25.118 CORONARY ARTERY DISEASE OF NATIVE ARTERY OF NATIVE HEART WITH STABLE ANGINA PECTORIS: Primary | ICD-10-CM

## 2025-08-13 DIAGNOSIS — R94.39 ABNORMAL STRESS TEST: ICD-10-CM

## 2025-08-13 LAB
OHS QRS DURATION: 90 MS
OHS QTC CALCULATION: 431 MS

## 2025-08-13 PROCEDURE — 1101F PT FALLS ASSESS-DOCD LE1/YR: CPT | Mod: CPTII,S$GLB,, | Performed by: PHYSICIAN ASSISTANT

## 2025-08-13 PROCEDURE — 1159F MED LIST DOCD IN RCRD: CPT | Mod: CPTII,S$GLB,, | Performed by: PHYSICIAN ASSISTANT

## 2025-08-13 PROCEDURE — 1160F RVW MEDS BY RX/DR IN RCRD: CPT | Mod: CPTII,S$GLB,, | Performed by: PHYSICIAN ASSISTANT

## 2025-08-13 PROCEDURE — 93010 ELECTROCARDIOGRAM REPORT: CPT | Mod: ,,, | Performed by: INTERNAL MEDICINE

## 2025-08-13 PROCEDURE — 1126F AMNT PAIN NOTED NONE PRSNT: CPT | Mod: CPTII,S$GLB,, | Performed by: PHYSICIAN ASSISTANT

## 2025-08-13 PROCEDURE — 3078F DIAST BP <80 MM HG: CPT | Mod: CPTII,S$GLB,, | Performed by: PHYSICIAN ASSISTANT

## 2025-08-13 PROCEDURE — 3288F FALL RISK ASSESSMENT DOCD: CPT | Mod: CPTII,S$GLB,, | Performed by: PHYSICIAN ASSISTANT

## 2025-08-13 PROCEDURE — 3008F BODY MASS INDEX DOCD: CPT | Mod: CPTII,S$GLB,, | Performed by: PHYSICIAN ASSISTANT

## 2025-08-13 PROCEDURE — 99214 OFFICE O/P EST MOD 30 MIN: CPT | Mod: 25,S$GLB,, | Performed by: PHYSICIAN ASSISTANT

## 2025-08-13 PROCEDURE — 4010F ACE/ARB THERAPY RXD/TAKEN: CPT | Mod: CPTII,S$GLB,, | Performed by: PHYSICIAN ASSISTANT

## 2025-08-13 PROCEDURE — 93005 ELECTROCARDIOGRAM TRACING: CPT

## 2025-08-13 PROCEDURE — 3044F HG A1C LEVEL LT 7.0%: CPT | Mod: CPTII,S$GLB,, | Performed by: PHYSICIAN ASSISTANT

## 2025-08-13 PROCEDURE — 99999 PR PBB SHADOW E&M-EST. PATIENT-LVL III: CPT | Mod: PBBFAC,,, | Performed by: PHYSICIAN ASSISTANT

## 2025-08-13 PROCEDURE — 3074F SYST BP LT 130 MM HG: CPT | Mod: CPTII,S$GLB,, | Performed by: PHYSICIAN ASSISTANT

## 2025-09-02 RX ORDER — NADOLOL 20 MG/1
20 TABLET ORAL
Qty: 90 TABLET | Refills: 0 | Status: SHIPPED | OUTPATIENT
Start: 2025-09-02

## (undated) DEVICE — OMNIPAQUE 300MG 150ML VIAL

## (undated) DEVICE — CATH JL4 5FR

## (undated) DEVICE — CATH PIG145 INFINITI 5X110CM

## (undated) DEVICE — KIT SYR REUSABLE

## (undated) DEVICE — WIRE GUIDE TEFLON 3CM .035 145

## (undated) DEVICE — ANGIOTOUCH KIT

## (undated) DEVICE — PACK HEART CATH BR

## (undated) DEVICE — KIT MANIFOLD LOW PRESS TUBING

## (undated) DEVICE — DRAPE ANGIO BRACH 38X44IN

## (undated) DEVICE — KIT GLIDESHEATH SLEND 6FR 10CM

## (undated) DEVICE — CATH JR4 5FR

## (undated) DEVICE — BAND TR COMP DEVICE REG 24CM